# Patient Record
Sex: FEMALE | Race: WHITE | Employment: FULL TIME | URBAN - METROPOLITAN AREA
[De-identification: names, ages, dates, MRNs, and addresses within clinical notes are randomized per-mention and may not be internally consistent; named-entity substitution may affect disease eponyms.]

---

## 2017-01-09 ENCOUNTER — ALLIED HEALTH/NURSE VISIT (OUTPATIENT)
Dept: EDUCATION SERVICES | Facility: CLINIC | Age: 42
End: 2017-01-09
Payer: COMMERCIAL

## 2017-01-09 DIAGNOSIS — E10.9 TYPE 1 DIABETES MELLITUS WITHOUT COMPLICATION (H): Primary | ICD-10-CM

## 2017-01-09 PROCEDURE — G0108 DIAB MANAGE TRN  PER INDIV: HCPCS

## 2017-01-09 PROCEDURE — 99207 ZZC NO CHARGE LOS: CPT

## 2017-01-09 NOTE — PROGRESS NOTES
Diabetes Self Management Training: Insulin Pump    SUBJECTIVE:  Brigitte Robles presents today for Initiation of insulin pump related to  Type 1 diabetes   She is accompanied by self    Patient is being treated with insulin pump  Patient glucose self monitoring as follows: 6-8 times daily.  Avg BG (mg/dl)  +/-   BG Readings /day    Readings above Target:   %  Readings below Target:  %  BG results:   BG meter:   Insulin Pump Type: Medtronic Minimed 630G with Enlite Sensor    Patient concerns: is concerned about this being so new to her.  Normal fear.      Current Pump Settings-- Standard:  BASAL:  BASAL RATES and times:  12   AM (midnight): 0.75  units/hour    (Alternative Pattern if applicable)     Maximum Basal Rate: 2.0 units/hr    BOLUS:  Maximum Bolus Rate: 25 units  Dual/Square Wave or Extended Bolus: off  BG reminder: off  Bolus Wizard/Bolus Calculator:  on  Active Insulin Time:  4 hours  Insulin Concentration: U100  Percentage of Insulin use:  Basal %  :  Bolus %    CARB RATIO and times:  12   AM (midnight): 1/12  Corection Factor (Sensitivity) and times:  12   AM (midnight): 47 mg/dL  BLOOD GLUCOSE TARGET and times:  12   AM (midnight): 100 - 110  5     AM:  80 - 110    UTILITIES:  Alert type: vibrate and beep  Low [place] Alert: 20 units  Usual Site Change: 3 days  Patient knows how to download pump  , No, xxxx    SENSOR USE:  Using Continuous Glucose Monitor in conjunction with pump? She uses the Dexcom sensor and does not wish to part with this.  If Yes: Sensor alert on?   Patient adjusting based on sensor arrows:   Glucose Alerts: Low (mg/dl)   High (mg/dl)   Alert repeat:  /  time  Ave SG:   +/-   Wear duration:  d  h per week      Patient is comfortable making pump setting changes independently: working on it.  Patient understands and demonstrates accurate pump use: Yes  Opportunities/education identified by which patient would have increased benefit from pump therapy:  To learn how to use the pump  "and buttons    Taking other diabetes medications? Yes, Metformin    Symptoms of low blood sugar (hypoglycemia:sweating, shaky, weak, dizzy, blurred vision, confusion)? Yes    Patient carries a carbohydrate source with them regularly: Yes      Problem Solving: Patient has glucagon emergency kit: Yes. Patient understands DKA prevention: Yes. Patient has ketone test strips: Yes. Patient has an insulin multiple daily injection back-up plan: Yes.    Patient wears medical identification for diabetes: Yes     Physical Activity:    moderate regular exercise program which includes walking at the gym    Nutrition:  Patient currently eats 3 meals per day and counts carbohydrates in grams  Avg Daily Carbs (g)  150 +/- 30  Carb/bolus (g/U)  1/12        Cultural/Zoroastrianism diet restrictions: No     Biggest Challenge to Healthy Eating: none          OBJECTIVE: Brigitte has been DX with Type 1 diabetes almost a yr. Now.  She has since been using the Dexcom sensor, and her last A1C 7.1%.  She has been on basal bolus since DX    Lab Results:  GLC      322   2/10/2016  HDL       60   7/28/2015    Vitals:  There were no vitals taken for this visit.  Estimated body mass index is 28.41 kg/(m^2) as calculated from the following:    Height as of 9/12/16: 1.645 m (5' 4.75\").    Weight as of 9/12/16: 76.885 kg (169 lb 8 oz).   History   Smoking status     Never Smoker    Smokeless tobacco     Never Used       Medications reviewed today.     ASSESSMENT: Brigitte did very well with the training and able to insert her site today using [place] insert.  Pump was started at 1:00, BG before training 159, after [id number].  She was very nervous .  But did well  Will follow up with her tomorrow .         Patient learning needs: Monitoring and Taking Medication    Education provided today on:  AADE Self-Care Behaviors:  Patient was instructed on Contour Next USB Link (with Medtronic Pump) meter and was able to provide an accurate return demonstration. Patient's " blood glucose reading today was 159/255 mg/dL.        PLAN:  See Patient Instructions, AVS printed and provided to patient today.    Education Materials Provided:  Medtronic: Basal Quick Reference Guide, Bolus Wizard Reference Guide, Safety Rules Reference Guide, Infusion Sets, Tape Tips and Effective Infusion Site Management    Follow-up:    Follow-up with PCP recommended.  Follow-up with endocrinology recommended. She will see her Endo in 2 wks.   Follow-up as needed. Will make a follow up appointment. With me for more education  Chart routed to referring provider.    Based on learning assessment above, most appropriate setting for further diabetes education would be: Individual setting.    Rosio Knight RN/KIRAN  [place] Diabetes Educator      Download data sent to be scanned into this Epic encounter: Yes   Time Spent: 120 minutes  Encounter Type: Individual

## 2017-01-10 ENCOUNTER — TELEPHONE (OUTPATIENT)
Dept: EDUCATION SERVICES | Facility: CLINIC | Age: 42
End: 2017-01-10

## 2017-01-10 NOTE — TELEPHONE ENCOUNTER
She has a question regarding bolus insulin vs insulin on board.  She may still have some tresiba insulin active in her system and she was running in the 50's during the night.  Her Dexcom alerted her.  I did suggest she take a small bedtime snack the next few nights, or at least some protein.  Her Hs BG was 130 and today she is in the low 100's and feeling good.  Her basal has started in her pump as well.    Will call me with any questions that come up.    Rosio Knight RN/KIRAN  [place] Diabetes Educator

## 2017-01-13 ENCOUNTER — TELEPHONE (OUTPATIENT)
Dept: ENDOCRINOLOGY | Facility: CLINIC | Age: 42
End: 2017-01-13

## 2017-01-13 NOTE — TELEPHONE ENCOUNTER
Pt received pump training earlier this week by KIRAN Pastrana. Left vm for pt to call me. Would llike to see her for pump f/u on same day she sees Dr Desai in Jan. Please call to advise if able to schedule appt with me on that day.     Fatou Arora, RN, BSN, CDE   [facility]

## 2017-01-18 NOTE — TELEPHONE ENCOUNTER
Please see phone note entered on 01/13/17. Pt has not returned my call.    Fatou Arora RN, BSN, CDE   [facility]

## 2017-01-19 ENCOUNTER — VIRTUAL VISIT (OUTPATIENT)
Dept: EDUCATION SERVICES | Facility: CLINIC | Age: 42
End: 2017-01-19
Payer: COMMERCIAL

## 2017-01-19 ENCOUNTER — TELEPHONE (OUTPATIENT)
Dept: EDUCATION SERVICES | Facility: CLINIC | Age: 42
End: 2017-01-19

## 2017-01-19 DIAGNOSIS — E10.9 TYPE 1 DIABETES MELLITUS WITHOUT COMPLICATION (H): Primary | ICD-10-CM

## 2017-01-19 PROCEDURE — 99207 ZZC NO CHARGE LOS: CPT

## 2017-01-19 NOTE — TELEPHONE ENCOUNTER
"Brigitte called and she is discouraged by the fact she is running BG higher than she was on MDI.  I explained to her that we did cut back her insulin by 25% and will build on the doses based on her BG.  She was upset with the pump.    Changed her Basal rate from 0.75 to 0.825 units/hr.  Changed her carb ratio from 1/12 to 1/10 which is what she was doing before.  Changed her sensitivity from 47 to 42.0 based on her TDD of insulin.    She will give it a few days and she is seeing Dr. Desai next week for more adjustments.  I did go over the fact that this process takes some time and adjustments until we get the correct doses to meet her needs.  She was concerned it will effect her A1C and that is the reason for her concern as well.  She did mention that at the insertion sites she is seeing \"bumps\" for a few days and concerned.  It could be reaction to the canula or tape.  Will have [employer] rep call her and then let Dr. Desai know as well.  Will forward this to Dr. Desai and Fatou Arora for follow up.    Rosio Knight RN/TEREE  [place] Diabetes Educator    "

## 2017-01-20 NOTE — TELEPHONE ENCOUNTER
Message/pt concerns/issues noted. Will plan to check in with pt when she returns for visit with Dr Desai.     Fatou Arora, RN, BSN, CDE   [facility]

## 2017-01-23 ENCOUNTER — OFFICE VISIT (OUTPATIENT)
Dept: ENDOCRINOLOGY | Facility: CLINIC | Age: 42
End: 2017-01-23
Payer: COMMERCIAL

## 2017-01-23 VITALS
DIASTOLIC BLOOD PRESSURE: 75 MMHG | WEIGHT: 174.4 LBS | HEIGHT: 64 IN | HEART RATE: 84 BPM | BODY MASS INDEX: 29.78 KG/M2 | SYSTOLIC BLOOD PRESSURE: 142 MMHG

## 2017-01-23 DIAGNOSIS — E10.9 TYPE 1 DIABETES MELLITUS WITHOUT COMPLICATION (H): ICD-10-CM

## 2017-01-23 LAB — HBA1C MFR BLD: 7.1 % (ref 0–5.7)

## 2017-01-23 PROCEDURE — 99213 OFFICE O/P EST LOW 20 MIN: CPT | Performed by: INTERNAL MEDICINE

## 2017-01-23 PROCEDURE — 36415 COLL VENOUS BLD VENIPUNCTURE: CPT | Performed by: INTERNAL MEDICINE

## 2017-01-23 PROCEDURE — 83036 HEMOGLOBIN GLYCOSYLATED A1C: CPT | Performed by: INTERNAL MEDICINE

## 2017-01-23 NOTE — Clinical Note
1/23/2017       RE: Brigitte Robles  [address] [facility] [address]     Dear Colleague,    Thank you for referring your patient, Brigitte Robles, to the [facility] at [facility]. Please see a copy of my visit note below.         Endocrinology Note         Brigitte is a 41 year old female presents today for type 1 diabetes.    HPI  Ms Brigitte Robles is a 41 years old female who is here for type 1 diabetes    She was noted to have increased blurred vision and fatigue in February 2016 and noted to have blood glucose of 300s and A1C of 7.0%. She was initially diagnosed with type 2 diabetes and was started on metformin 500 mg bid which did not control her blood glucose. She was then seen by her PCP who ran more blood test and noted for low c-peptide and positive MONTSERRAT ab and IA2 antibody. She was then started on insulin.    Interim history:  She switched on trial of insulin pump about 2-3 weeks ago and does not like insulin pump at all because it causes fluctuation of glucose numbers. She felt not well when glucoses was up and down. She is now wearing Medtronic MiniMed 630G with basal rate of 0.825 and ICR 1:10 and sensitivity of 1:42. Average glucose over the past 3 weeks was 181 (SD 61), average basal insulin 18.19 (46%) and average bolus 21.55 (54%)    Prior to switching, she was on insulin degludec (Tresiba) 24-30 units at night, Novolog 1 unit per 10 gram and correction factor of 1 unit per 50 above 150 mg/dl. She loves Tresiba and would like to switch back to it. A1C today is 7.1%.     2) hypothyroidism: She has history of hypothyroidism for the past 11 years. It was diagnosed about 6 months postpartum. She is currently on levothyroxine 112 mcg daily. TSH in 9/2016 was 1.7    Past Medical History  Type 1 diabetes  Hypothyroidism    Allergies  Allergies   Allergen Reactions     Levothyroxine      Dizzy to generic --- not to synthroid      Medications  Current Outpatient Prescriptions   Medication Sig Dispense Refill     insulin aspart (NOVOLOG VIAL) 100 UNITS/ML injection To use with her insulin pump. 6 vial 3     blood glucose monitoring (ALLI CONTOUR NEXT) test strip 1 strip by In Vitro route 8 times daily Use to test blood sugar 8 times daily or as directed. 15 Box 11     blood glucose monitoring (ALLI MICROLET) lancets 1 each 8 times daily Use to test blood sugar 8 times daily or as directed. 2 Box 11     insulin pen needle 31G X 6 MM Use 6 daily or as directed. 200 each 12     levothyroxine (SYNTHROID, LEVOTHROID) 112 MCG tablet Take 1 tablet (112 mcg) by mouth daily Appointment needed for further refills. 90 tablet 1     blood glucose monitoring (ALLI MICROLET) lancets 1 each 6 times daily Use to test blood sugar 6 times daily or as directed. 1 Box 12     blood glucose monitoring (NO BRAND SPECIFIED) test strip 1 strip by In Vitro route 6 times daily Use to test blood sugar 6 times daily or as directed. 8 Box 12     ONETOUCH DELICA LANCETS 33G MISC 1 Box 6 times daily 100 each 12     fluticasone (FLONASE) 50 MCG/ACT nasal spray Spray 1-2 sprays into both nostrils daily 1 Package 11     insulin lispro (HUMALOG) 100 UNIT/ML injection For insulin pump usage. 1 vial 0     blood glucose monitoring (ACCU-CHEK NASRIN PLUS) test strip Use to test blood sugar 8 times daily or as directed.  Ok to substitute alternative if insurance prefers. 15 Box 11     insulin degludec (TRESIBA) 200 UNIT/ML pen Inject 30 Units Subcutaneous daily 18 mL 3     blood glucose monitoring (ACCU-CHEK FASTCLIX) lancets Use to test blood sugar 8 times daily or as directed.  Ok to substitute alternative if insurance prefers. 2 Box 11     glucagon (GLUCAGON EMERGENCY) 1 MG injection Inject 1 mg into the muscle once for 1 dose 1 mg 11     insulin aspart (NOVOLOG FLEXPEN) 100 UNIT/ML soln To give 1 unit/10 gms of carbs with each meal plus a scale given to patient.  May use up  "to 50 units per day. 3 Month 6     Family History  Type 1 diabetes in her father, mother, paternal aunt, paternal uncle, brother and sister  Type 2 diabetes in her grandmother  Heart in her maternal grandfather and maternal grandmother  hypothyroid in her brother, father, maternal grandfather, maternal grandmother, mother, and sister.     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    ROS  Constitutional: feeling well so far  Eyes: no vision change, diplopia, no more blurred vision  Cardiovascular: no chest pain, palpitations  Respiratory: no dyspnea, cough, shortness of breath  GI: no nausea, vomiting, diarrhea or constipation, no abdominal pain   : no change in urine, no dysuria   Musculoskeletal: no joint swelling   Integumentary: occasional hive  Neuro: no numbness or tingling, no tremor, no headache   Endo: no polyuria or polydipsia, no temperature intolerance   Psych: she denied snoring    Physical Exam  /75 mmHg  Pulse 84  Ht 1.632 m (5' 4.25\")  Wt 79.107 kg (174 lb 6.4 oz)  BMI 29.70 kg/m2  Body mass index is 29.7 kg/(m^2).  Constitutional: no distress, comfortable, pleasant   Eyes: anicteric  Musculoskeletal: no edema   Skin: no jaundice   Neurological: normal gait, no tremor   Psychological: appropriate mood     RESULTS  A1C      7.1   1/23/2017  A1C      7.1   9/12/2016  A1C      7.0   2/5/2016     Ref. Range 2/5/2016 11:41   Glutamic Acid Decarboxylase Antibody Unknown 45.7 (H)   IA-2 Antibody Unknown 3.4 (H)   Islet Cell Antibody IgG Unknown <1:4...      Ref. Range 2/10/2016 08:35   C-Peptide Latest Ref Range: 0.9-6.9 ng/mL 0.7 (L)     ENDO DIABETES Latest Ref Rng 2/10/2016   CREATININE 0.52 - 1.04 mg/dL 0.74     ASSESSMENT:    Ms Brigitte Robles is a 41 years old female who is here for type 1 diabetes    1) type 1 diabetes: newly diagnosed type 1 diabetes in Feb 2016. She is on trail of insulin but does not like it due to " fluctuation of blood glucose.   - she likes to switch back to insulin Tresiba which I am ok with   - she will continue to check blood glucose 3-4 times per day     2) Hypothyroidism: clinically and biochemically euthyroid. Continue with LT4 112 mcg daily.     PLAN:   - ok to switch insulin pump back to basal/bolus  - if she decided to go back on basal and bolus, she can continue with insulin degludec (Tresiba) 30 units daily and Novolog 1 unit per 10 gram and correction factor 1 unit per 50 above 150 mg/dl  - RTC 4 months    Lloyd Ledesma MD  Endocrinology  [phone]        Again, thank you for allowing me to participate in the care of your patient.      Sincerely,    Lloyd Ledesma MD

## 2017-01-23 NOTE — PATIENT INSTRUCTIONS
Sending blood sugars to your provider at [facility]:  We want to help you with your diabetes management, which often requires frequent adjustments to your therapy. For your convenience, we have several ways to send your blood sugars to your doctor for review.    - Send message directly to your doctor through My Chart.  Please ask the rooming staff if you would like to sign up for My Chart.  This is a fast and confidential way to send your information and communicate directly with your provider.   - Record readings and fax to [phone].  We have a template for you to use for your convenience.  - If you have a Medtronic pump, upload to youmag and notify your provider of your username and password.   - Stop by the clinic with your meter for download.   - My Chart or call Fatou Arora, Diabetes Educator at [phone]  - Call the clinic and speak to one of the endocrine nurses to relay information on the telephone.  Cora Castanon, or Zule[place] at [phone].   -    Please call the on-call Endocrinologist at the [facility] for after       hours/weekend needs at [phone] Option #4.    Please note that you do not need to FAST if you are just having an A1C drawn. Please remember to ALWAYS bring your glucose meter with to your appointment. This data is very important for the management of your care.    Thank you!  Your [facility] Care Team      5/8/17 3:50 follow up with Dr. Ledesma

## 2017-01-23 NOTE — MR AVS SNAPSHOT
After Visit Summary   1/23/2017    Brigitte Robles    MRN: [id number]           Patient Information     Date Of Birth          1975        Visit Information        Provider Department      1/23/2017 4:00 PM Lloyd Ledesma MD; [facility]        Today's Diagnoses     Type 1 diabetes mellitus without complication (H)           Care Instructions      Sending blood sugars to your provider at [facility]:  We want to help you with your diabetes management, which often requires frequent adjustments to your therapy. For your convenience, we have several ways to send your blood sugars to your doctor for review.    - Send message directly to your doctor through My Chart.  Please ask the rooming staff if you would like to sign up for My Chart.  This is a fast and confidential way to send your information and communicate directly with your provider.   - Record readings and fax to [phone].  We have a template for you to use for your convenience.  - If you have a Medtronic pump, upload to [employer] and notify your provider of your username and password.   - Stop by the clinic with your meter for download.   - My Chart or call Fatou Arora, Diabetes Educator at [phone]  - Call the clinic and speak to one of the endocrine nurses to relay information on the telephone.  Cora Castanon, or Zuleyma at [phone].   -    Please call the on-call Endocrinologist at the [facility] for after       hours/weekend needs at [phone] Option #4.    Please note that you do not need to FAST if you are just having an A1C drawn. Please remember to ALWAYS bring your glucose meter with to your appointment. This data is very important for the management of your care.    Thank you!  Your [facility] Care Team      5/8/17 3:50 follow up with Dr. Ledesma          Follow-ups after your visit        Your next 10 appointments already scheduled     May 08, 2017  3:50 PM  "  Return Visit with Lloyd Ledesma MD, [facility] ENDO NURSE   [facility]    [address] [address] [place] [address] [phone]              Who to contact     If you have questions or need follow up information about today's clinic visit or your schedule please contact [facility] directly at [phone].  Normal or non-critical lab and imaging results will be communicated to you by [employer], letter or phone within 4 business days after the clinic has received the results. If you do not hear from us within 7 days, please contact the clinic through [employer] or phone. If you have a critical or abnormal lab result, we will notify you by phone as soon as possible.  Submit refill requests through [employer] or call your pharmacy and they will forward the refill request to us. Please allow 3 business days for your refill to be completed.          Additional Information About Your Visit        [employer] Information     [employer] gives you secure access to your electronic health record. If you see a primary care provider, you can also send messages to your care team and make appointments. If you have questions, please call your primary care clinic.  If you do not have a primary care provider, please call [phone] and they will assist you.      [employer] is an electronic gateway that provides easy, online access to your medical records. With [employer], you can request a clinic appointment, read your test results, renew a prescription or communicate with your care team.     To access your existing account, please contact your [facility] or call [phone] for assistance.        Care EveryWhere ID     This is your Care EveryWhere ID. This could be used by other organizations to access your [facility] medical records  [id number]        Your Vitals Were     Pulse Height BMI (Body Mass Index)             84 1.632 m (5' 4.25\") " 29.70 kg/m2          Blood Pressure from Last 3 Encounters:   01/23/17 142/75   09/12/16 126/81   02/24/16 130/84    Weight from Last 3 Encounters:   01/23/17 79.107 kg (174 lb 6.4 oz)   09/12/16 76.885 kg (169 lb 8 oz)   04/18/16 80.604 kg (177 lb 11.2 oz)              We Performed the Following     Hemoglobin A1c POCT        Primary Care Provider Office Phone # Fax #    Kristen M Kehr, PA-C [phone]       [facility] [address] [facility] [address]        Thank you!     Thank you for choosing [facility]  for your care. Our goal is always to provide you with excellent care. Hearing back from our patients is one way we can continue to improve our services. Please take a few minutes to complete the written survey that you may receive in the mail after your visit with us. Thank you!             Your Updated Medication List - Protect others around you: Learn how to safely use, store and throw away your medicines at www.disposemymeds.org.          This list is accurate as of: 1/23/17  4:37 PM.  Always use your most recent med list.                   Brand Name Dispense Instructions for use    * blood glucose monitoring lancets     1 Box    1 each 6 times daily Use to test blood sugar 6 times daily or as directed.       * ONETOUCH DELICA LANCETS 33G Misc     100 each    1 Box 6 times daily       * blood glucose monitoring lancets     2 Box    Use to test blood sugar 8 times daily or as directed.  Ok to substitute alternative if insurance prefers.       * blood glucose monitoring lancets     2 Box    1 each 8 times daily Use to test blood sugar 8 times daily or as directed.       * blood glucose monitoring test strip    no brand specified    8 Box    1 strip by In Vitro route 6 times daily Use to test blood sugar 6 times daily or as directed.       * blood glucose monitoring test strip    ACCU-CHEK NASRIN PLUS    15 Box    Use to test blood sugar 8 times daily or as  directed.  Ok to substitute alternative if insurance prefers.       * blood glucose monitoring test strip    ALLI CONTOUR NEXT    15 Box    1 strip by In Vitro route 8 times daily Use to test blood sugar 8 times daily or as directed.       fluticasone 50 MCG/ACT spray    FLONASE    1 Package    Spray 1-2 sprays into both nostrils daily       glucagon 1 MG kit    GLUCAGON EMERGENCY    1 mg    Inject 1 mg into the muscle once for 1 dose       * insulin aspart 100 UNIT/ML injection    NovoLOG FLEXPEN    3 Month    To give 1 unit/10 gms of carbs with each meal plus a scale given to patient.  May use up to 50 units per day.       * insulin aspart 100 UNITS/ML injection    NovoLOG VIAL    6 vial    To use with her insulin pump.       insulin degludec 200 UNIT/ML pen    TRESIBA    18 mL    Inject 30 Units Subcutaneous daily       insulin lispro 100 UNIT/ML injection    humaLOG    1 vial    For insulin pump usage.       insulin pen needle 31G X 6 MM     200 each    Use 6 daily or as directed.       levothyroxine 112 MCG tablet    SYNTHROID/LEVOTHROID    90 tablet    Take 1 tablet (112 mcg) by mouth daily Appointment needed for further refills.       * Notice:  This list has 9 medication(s) that are the same as other medications prescribed for you. Read the directions carefully, and ask your doctor or other care provider to review them with you.

## 2017-01-23 NOTE — PROGRESS NOTES
Endocrinology Note         Brigitte is a 41 year old female presents today for type 1 diabetes.    HPI  Ms Brigitte Robles is a 41 years old female who is here for type 1 diabetes    She was noted to have increased blurred vision and fatigue in February 2016 and noted to have blood glucose of 300s and A1C of 7.0%. She was initially diagnosed with type 2 diabetes and was started on metformin 500 mg bid which did not control her blood glucose. She was then seen by her PCP who ran more blood test and noted for low c-peptide and positive MONTSERRAT ab and IA2 antibody. She was then started on insulin.    Interim history:  She switched on trial of insulin pump about 2-3 weeks ago and does not like insulin pump at all because it causes fluctuation of glucose numbers. She felt not well when glucoses was up and down. She is now wearing Medtronic MiniMed 630G with basal rate of 0.825 and ICR 1:10 and sensitivity of 1:42. Average glucose over the past 3 weeks was 181 (SD 61), average basal insulin 18.19 (46%) and average bolus 21.55 (54%)    Prior to switching, she was on insulin degludec (Tresiba) 24-30 units at night, Novolog 1 unit per 10 gram and correction factor of 1 unit per 50 above 150 mg/dl. She loves Tresiba and would like to switch back to it. A1C today is 7.1%.     2) hypothyroidism: She has history of hypothyroidism for the past 11 years. It was diagnosed about 6 months postpartum. She is currently on levothyroxine 112 mcg daily. TSH in 9/2016 was 1.7    Past Medical History  Type 1 diabetes  Hypothyroidism    Allergies  Allergies   Allergen Reactions     Levothyroxine      Dizzy to generic --- not to synthroid     Medications  Current Outpatient Prescriptions   Medication Sig Dispense Refill     insulin aspart (NOVOLOG VIAL) 100 UNITS/ML injection To use with her insulin pump. 6 vial 3     blood glucose monitoring (ALLI CONTOUR NEXT) test strip 1 strip by In Vitro route 8 times daily Use to test blood sugar 8 times  daily or as directed. 15 Box 11     blood glucose monitoring (ALLI MICROLET) lancets 1 each 8 times daily Use to test blood sugar 8 times daily or as directed. 2 Box 11     insulin pen needle 31G X 6 MM Use 6 daily or as directed. 200 each 12     levothyroxine (SYNTHROID, LEVOTHROID) 112 MCG tablet Take 1 tablet (112 mcg) by mouth daily Appointment needed for further refills. 90 tablet 1     blood glucose monitoring (ALLI MICROLET) lancets 1 each 6 times daily Use to test blood sugar 6 times daily or as directed. 1 Box 12     blood glucose monitoring (NO BRAND SPECIFIED) test strip 1 strip by In Vitro route 6 times daily Use to test blood sugar 6 times daily or as directed. 8 Box 12     ONETOUCH DELICA LANCETS 33G MISC 1 Box 6 times daily 100 each 12     fluticasone (FLONASE) 50 MCG/ACT nasal spray Spray 1-2 sprays into both nostrils daily 1 Package 11     insulin lispro (HUMALOG) 100 UNIT/ML injection For insulin pump usage. 1 vial 0     blood glucose monitoring (ACCU-CHEK NASRIN PLUS) test strip Use to test blood sugar 8 times daily or as directed.  Ok to substitute alternative if insurance prefers. 15 Box 11     insulin degludec (TRESIBA) 200 UNIT/ML pen Inject 30 Units Subcutaneous daily 18 mL 3     blood glucose monitoring (ACCU-CHEK FASTCLIX) lancets Use to test blood sugar 8 times daily or as directed.  Ok to substitute alternative if insurance prefers. 2 Box 11     glucagon (GLUCAGON EMERGENCY) 1 MG injection Inject 1 mg into the muscle once for 1 dose 1 mg 11     insulin aspart (NOVOLOG FLEXPEN) 100 UNIT/ML soln To give 1 unit/10 gms of carbs with each meal plus a scale given to patient.  May use up to 50 units per day. 3 Month 6     Family History  Type 1 diabetes in her father, mother, paternal aunt, paternal uncle, brother and sister  Type 2 diabetes in her grandmother  Heart in her maternal grandfather and maternal grandmother  hypothyroid in her brother, father, maternal grandfather, maternal  "grandmother, mother, and sister.     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    ROS  Constitutional: feeling well so far  Eyes: no vision change, diplopia, no more blurred vision  Cardiovascular: no chest pain, palpitations  Respiratory: no dyspnea, cough, shortness of breath  GI: no nausea, vomiting, diarrhea or constipation, no abdominal pain   : no change in urine, no dysuria   Musculoskeletal: no joint swelling   Integumentary: occasional hive  Neuro: no numbness or tingling, no tremor, no headache   Endo: no polyuria or polydipsia, no temperature intolerance   Psych: she denied snoring    Physical Exam  /75 mmHg  Pulse 84  Ht 1.632 m (5' 4.25\")  Wt 79.107 kg (174 lb 6.4 oz)  BMI 29.70 kg/m2  Body mass index is 29.7 kg/(m^2).  Constitutional: no distress, comfortable, pleasant   Eyes: anicteric  Musculoskeletal: no edema   Skin: no jaundice   Neurological: normal gait, no tremor   Psychological: appropriate mood     RESULTS  A1C      7.1   1/23/2017  A1C      7.1   9/12/2016  A1C      7.0   2/5/2016     Ref. Range 2/5/2016 11:41   Glutamic Acid Decarboxylase Antibody Unknown 45.7 (H)   IA-2 Antibody Unknown 3.4 (H)   Islet Cell Antibody IgG Unknown <1:4...      Ref. Range 2/10/2016 08:35   C-Peptide Latest Ref Range: 0.9-6.9 ng/mL 0.7 (L)     ENDO DIABETES Latest Ref Rng 2/10/2016   CREATININE 0.52 - 1.04 mg/dL 0.74     ASSESSMENT:    Ms Brigitte Robles is a 41 years old female who is here for type 1 diabetes    1) type 1 diabetes: newly diagnosed type 1 diabetes in Feb 2016. She is on trail of insulin but does not like it due to fluctuation of blood glucose.   - she likes to switch back to insulin Tresiba which I am ok with   - she will continue to check blood glucose 3-4 times per day     2) Hypothyroidism: clinically and biochemically euthyroid. Continue with LT4 112 mcg daily.     PLAN:   - ok to switch insulin pump back to " basal/bolus  - if she decided to go back on basal and bolus, she can continue with insulin degludec (Tresiba) 30 units daily and Novolog 1 unit per 10 gram and correction factor 1 unit per 50 above 150 mg/dl  - RTC 4 months    Lloyd Ledesma MD  Endocrinology  [phone]

## 2017-01-23 NOTE — NURSING NOTE
"Brigitte Robles's goals for this visit include:   Chief Complaint   Patient presents with     Diabetes     Thyroid Problem       She requests these members of her care team be copied on today's visit information: Kehr, Kristen M      PCP: Kehr, Kristen M    Referring Provider:  No referring provider defined for this encounter.    Chief Complaint   Patient presents with     Diabetes     Thyroid Problem       Initial /75 mmHg  Pulse 84  Ht 1.632 m (5' 4.25\")  Wt 79.107 kg (174 lb 6.4 oz)  BMI 29.70 kg/m2 Estimated body mass index is 29.7 kg/(m^2) as calculated from the following:    Height as of this encounter: 1.632 m (5' 4.25\").    Weight as of this encounter: 79.107 kg (174 lb 6.4 oz).  BP completed using cuff size: large    Do you need any medication refills at today's visit? No    Zuleyma Abrams LPN      "

## 2017-01-25 DIAGNOSIS — E10.9 TYPE 1 DIABETES MELLITUS WITHOUT COMPLICATION (H): Primary | ICD-10-CM

## 2017-01-25 NOTE — TELEPHONE ENCOUNTER
Medication not on Endocrine Refill Protocol. Will route to Dr. Ledesma for review.    Cora Amos LPN  Adult Endocrinology  [facility]

## 2017-01-27 NOTE — TELEPHONE ENCOUNTER
Refill completed by Dr. Ledesma.    Cora Amos LPN  Adult Endocrinology  [facility]

## 2017-02-28 ENCOUNTER — TELEPHONE (OUTPATIENT)
Dept: FAMILY MEDICINE | Facility: CLINIC | Age: 42
End: 2017-02-28

## 2017-02-28 NOTE — TELEPHONE ENCOUNTER
Panel Management Review      Patient has the following on her problem list:     Depression / Dysthymia review  PHQ-9 SCORE 3/6/2013 2/3/2014 7/27/2015   Total Score 0 0 0      Patient is due for:  None    Diabetes    ASA: Failed    Last A1C  Lab Results   Component Value Date    A1C 7.1 01/23/2017    A1C 7.1 09/12/2016    A1C 7.0 02/05/2016     A1C tested: Passed    Last LDL:    Lab Results   Component Value Date    CHOL 142 07/28/2015     Lab Results   Component Value Date    HDL 60 07/28/2015     Lab Results   Component Value Date    LDL 67 07/28/2015     Lab Results   Component Value Date    TRIG 76 07/28/2015     Lab Results   Component Value Date    CHOLHDLRATIO 2.4 07/28/2015     No results found for: NHDL    Is the patient on a Statin? NO             Is the patient on Aspirin? NO        Last three blood pressure readings:  BP Readings from Last 3 Encounters:   01/23/17 142/75   09/12/16 126/81   02/24/16 130/84       Date of last diabetes office visit: 01/23/17     Tobacco History:     History   Smoking Status     Never Smoker   Smokeless Tobacco     Never Used           Composite cancer screening  Chart review shows that this patient is due/due soon for the following Pap Smear  Summary:    Patient is due/failing the following:   PAP, PHQ9 and PHYSICAL    Action needed:   Patient needs office visit for physical.    Type of outreach:    Sent [employer] message.    Questions for provider review:    None                                                                                                                                    AUDRA Vera MA       Chart routed to close .

## 2017-03-10 ENCOUNTER — TELEPHONE (OUTPATIENT)
Dept: FAMILY MEDICINE | Facility: CLINIC | Age: 42
End: 2017-03-10

## 2017-03-10 NOTE — TELEPHONE ENCOUNTER
3/10/2017    Call Regarding Preventive Health Screening Cervical/PAP    Attempt 1    Message on voicemail     Comments:       Outreach   KV

## 2017-03-16 NOTE — TELEPHONE ENCOUNTER
3/16/2017    Call Regarding Preventive Health Screening Cervical/PAP    Attempt 2    Message on voicemail     Comments:       Outreach   cnt

## 2017-03-22 DIAGNOSIS — E10.9 TYPE 1 DIABETES MELLITUS WITHOUT COMPLICATION (H): Primary | ICD-10-CM

## 2017-03-25 ENCOUNTER — TELEPHONE (OUTPATIENT)
Dept: URGENT CARE | Facility: URGENT CARE | Age: 42
End: 2017-03-25

## 2017-03-25 DIAGNOSIS — Z20.828 EXPOSURE TO THE FLU: Primary | ICD-10-CM

## 2017-03-25 RX ORDER — OSELTAMIVIR PHOSPHATE 75 MG/1
75 CAPSULE ORAL DAILY
Qty: 10 CAPSULE | Refills: 0 | Status: SHIPPED | OUTPATIENT
Start: 2017-03-25 | End: 2017-05-08

## 2017-03-26 NOTE — TELEPHONE ENCOUNTER
Patient type 1 DM.  has influenza like symptoms, patient would like prophylaxis  Side effects of tamiflu discussed

## 2017-03-30 DIAGNOSIS — E10.65 TYPE 1 DIABETES MELLITUS WITH HYPERGLYCEMIA (H): ICD-10-CM

## 2017-03-30 RX ORDER — INSULIN ASPART 100 [IU]/ML
INJECTION, SOLUTION INTRAVENOUS; SUBCUTANEOUS
Qty: 9 ML | Refills: 1 | Status: SHIPPED | OUTPATIENT
Start: 2017-03-30 | End: 2017-07-28

## 2017-04-07 ENCOUNTER — MYC MEDICAL ADVICE (OUTPATIENT)
Dept: ENDOCRINOLOGY | Facility: CLINIC | Age: 42
End: 2017-04-07

## 2017-04-07 DIAGNOSIS — E10.9 TYPE 1 DIABETES MELLITUS WITHOUT COMPLICATION (H): ICD-10-CM

## 2017-04-08 DIAGNOSIS — E03.9 HYPOTHYROIDISM, UNSPECIFIED TYPE: ICD-10-CM

## 2017-04-11 RX ORDER — LEVOTHYROXINE SODIUM 112 UG/1
112 TABLET ORAL DAILY
Qty: 90 TABLET | Refills: 1 | Status: SHIPPED | OUTPATIENT
Start: 2017-04-11 | End: 2017-05-08

## 2017-04-18 NOTE — TELEPHONE ENCOUNTER
Medication not on Endocrine Refill Protocol. Will route to Dr. Ledesma for review.    Cora Amos LPN  Adult Endocrinology  [facility]

## 2017-05-05 DIAGNOSIS — E03.9 HYPOTHYROIDISM, UNSPECIFIED TYPE: ICD-10-CM

## 2017-05-08 ENCOUNTER — OFFICE VISIT (OUTPATIENT)
Dept: ENDOCRINOLOGY | Facility: CLINIC | Age: 42
End: 2017-05-08
Payer: COMMERCIAL

## 2017-05-08 VITALS
WEIGHT: 183 LBS | DIASTOLIC BLOOD PRESSURE: 77 MMHG | HEIGHT: 64 IN | HEART RATE: 81 BPM | SYSTOLIC BLOOD PRESSURE: 133 MMHG | BODY MASS INDEX: 31.24 KG/M2

## 2017-05-08 DIAGNOSIS — E03.9 HYPOTHYROIDISM, UNSPECIFIED TYPE: ICD-10-CM

## 2017-05-08 DIAGNOSIS — E10.9 TYPE 1 DIABETES MELLITUS WITHOUT COMPLICATION (H): ICD-10-CM

## 2017-05-08 LAB — HBA1C MFR BLD: 7.2 % (ref 0–5.7)

## 2017-05-08 PROCEDURE — 83036 HEMOGLOBIN GLYCOSYLATED A1C: CPT | Performed by: INTERNAL MEDICINE

## 2017-05-08 PROCEDURE — 36415 COLL VENOUS BLD VENIPUNCTURE: CPT | Performed by: INTERNAL MEDICINE

## 2017-05-08 PROCEDURE — 99213 OFFICE O/P EST LOW 20 MIN: CPT | Performed by: INTERNAL MEDICINE

## 2017-05-08 RX ORDER — LEVOTHYROXINE SODIUM 112 MCG
112 TABLET ORAL DAILY
Qty: 90 TABLET | Refills: 3 | Status: SHIPPED | OUTPATIENT
Start: 2017-05-08 | End: 2018-01-29

## 2017-05-08 RX ORDER — LEVOTHYROXINE SODIUM 112 MCG
TABLET ORAL
Qty: 90 TABLET | Refills: 1 | OUTPATIENT
Start: 2017-05-08

## 2017-05-08 NOTE — LETTER
5/8/2017      RE: Brigitte Robles  [address] [place] [id number]     Dear Colleague,    Thank you for referring your patient, Brigitte Robles, to the [facility]. Please see a copy of my visit note below.         Endocrinology Note         Brigitte is a 41 year old female presents today for type 1 diabetes.    HPI  Ms Brigitte Robles is a 41 years old female who is here for type 1 diabetes    She was noted to have increased blurred vision and fatigue in February 2016 and noted to have blood glucose of 300s and A1C of 7.0%. She was initially diagnosed with type 2 diabetes and was started on metformin 500 mg bid which did not control her blood glucose. She was then seen by her PCP who ran more blood test and noted for low c-peptide and positive MONTSERRAT ab and IA2 antibody. She was then started on insulin.    1) type 1 diabetes: she is currently on insulin degludec (Tresiba) 24-28 units at night, Novolog 1 unit per 10 gram and correction factor of 1 unit per 50 above 150 mg/dl.  A1C today is 7.2%.  Reviewed glucose log, average glucose 185 (SD 76. She still has elevated postprandial glucose particularly during having menstrual cycle. She reported having low blood glucose about 5 times per week and often overcorrected it. She also wears Dexcom and used to it to guide her glucose during the day.She is exploring how her body is responding to different types of food.     She is not interested in insulin pump at this time. She tired it earlier this year but did not like it because her glucose seemed to be more fluctuated.    2) hypothyroidism: She has history of hypothyroidism for the past 11 years. It was diagnosed about 6 months postpartum. She is currently on levothyroxine 112 mcg daily. TSH in 9/2016 was 1.7    Past Medical History  Type 1 diabetes  Hypothyroidism    Allergies  Allergies   Allergen Reactions     Levothyroxine      Dizzy to generic --- not to synthroid     Medications  Current  Outpatient Prescriptions   Medication Sig Dispense Refill     insulin degludec (TRESIBA) 200 UNIT/ML pen Inject 30 Units Subcutaneous daily 18 mL 3     levothyroxine (SYNTHROID) 112 MCG tablet Take 1 tablet (112 mcg) by mouth daily 90 tablet 1     NOVOLOG FLEXPEN 100 UNIT/ML soln 1 UNIT/10GM OF CARBS WITH EACH MEAL PLUS SCALE. MAX OF 50 UNITS PER DAY 9 mL 1     Continuous Glucose Monitor (DEXCOM G4 PLATINUM TRANSMITTER) KIT CHANGE EVERY 6 MONTHS 1 kit 1     insulin pen needle 31G X 6 MM Use 6 daily or as directed. 200 each 12     blood glucose monitoring (NO BRAND SPECIFIED) test strip 1 strip by In Vitro route 6 times daily Use to test blood sugar 6 times daily or as directed. 8 Box 12     ONETOUCH DELICA LANCETS 33G MISC 1 Box 6 times daily 100 each 12     Continuous Glucose Monitor (DEXCOM G5 MOB/G4 PLAT SENSOR) KIT CHANGE EVERY 7 DAYS 1 kit 0     insulin lispro (HUMALOG) 100 UNIT/ML injection For insulin pump usage. 1 vial 0     insulin aspart (NOVOLOG VIAL) 100 UNITS/ML injection To use with her insulin pump. (Patient not taking: Reported on 5/8/2017) 6 vial 3     glucagon (GLUCAGON EMERGENCY) 1 MG injection Inject 1 mg into the muscle once for 1 dose 1 mg 11     fluticasone (FLONASE) 50 MCG/ACT nasal spray Spray 1-2 sprays into both nostrils daily 1 Package 11     Family History  Type 1 diabetes in her father, mother, paternal aunt, paternal uncle, brother and sister  Type 2 diabetes in her grandmother  Heart in her maternal grandfather and maternal grandmother  hypothyroid in her brother, father, maternal grandfather, maternal grandmother, mother, and sister.     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    ROS  Constitutional: feeling well so far, wants to lose weight  Eyes: no vision change, diplopia, no more blurred vision  Cardiovascular: no chest pain, palpitations  Respiratory: no dyspnea, cough, shortness of breath  GI: no nausea,  "vomiting, diarrhea or constipation, no abdominal pain   : no change in urine, no dysuria   Musculoskeletal: no joint swelling   Integumentary: occasional hive  Neuro: no numbness or tingling, no tremor, no headache   Endo: no polyuria or polydipsia, no temperature intolerance   Psych: she denied snoring    Physical Exam  /77  Pulse 81  Ht 1.632 m (5' 4.25\")  Wt 83 kg (183 lb)  LMP 05/08/2017  BMI 31.17 kg/m2  Body mass index is 31.17 kg/(m^2).  Constitutional: no distress, comfortable, pleasant   Eyes: anicteric  Musculoskeletal: no edema   Skin: no jaundice   Neurological: normal gait, no tremor   Psychological: appropriate mood     RESULTS  Lab Results   Component Value Date    A1C 7.2 05/08/2017    A1C 7.1 01/23/2017    A1C 7.1 09/12/2016    A1C 7.0 02/05/2016        Ref. Range 2/5/2016 11:41   Glutamic Acid Decarboxylase Antibody Unknown 45.7 (H)   IA-2 Antibody Unknown 3.4 (H)   Islet Cell Antibody IgG Unknown <1:4...      Ref. Range 2/10/2016 08:35   C-Peptide Latest Ref Range: 0.9-6.9 ng/mL 0.7 (L)     ENDO DIABETES Latest Ref Rng 2/10/2016   CREATININE 0.52 - 1.04 mg/dL 0.74     ENDO THYROID LABS-UMP Latest Ref Rng & Units 9/12/2016   TSH 0.40 - 4.00 mU/L 1.70     ASSESSMENT:    Ms Brigitte Robles is a 41 years old female who is here for follow up type 1 diabetes    1) type 1 diabetes: diagnosed type 1 diabetes in Feb 2016.   - she is not interested in insulin pump at this time.  - will continue with Tresiba 24-28 units daily  - continue with Novolog 1 unit per 10 gram and correction factor of 1 unit per 50 above 150 mg/dl.  - she will continue to check blood glucose 3-4 times per day     2) Hypothyroidism: clinically and biochemically euthyroid. Continue with LT4 112 mcg daily.     PLAN:   - continue current insulin regimen  - if she decided to go back on basal and bolus, she can continue with insulin degludec (Tresiba) 30 units daily and Novolog 1 unit per 10 gram and correction factor 1 unit " per 50 above 150 mg/dl  - RTC 4 months with lab    Lloyd Ledesma MD  Endocrinology  [phone]      Again, thank you for allowing me to participate in the care of your patient.      Sincerely,    Lloyd Ledesma MD

## 2017-05-08 NOTE — PATIENT INSTRUCTIONS
Sending blood sugars to your provider at [facility]:  We want to help you with your diabetes management, which often requires frequent adjustments to your therapy. For your convenience, we have several ways to send your blood sugars to your doctor for review.    - Send message directly to your doctor through My Chart.  Please ask the rooming staff if you would like to sign up for My Chart.  This is a fast and confidential way to send your information and communicate directly with your provider.   - Record readings and fax to [phone].  We have a template for you to use for your convenience.  - If you have a Medtronic pump, upload to [employer] and notify your provider of your username and password.   - Stop by the clinic with your meter for download.   - My Chart or call Fatou Arora, Diabetes Educator at [phone]  - Call the clinic and speak to one of the endocrine nurses to relay information on the telephone.  Cora Castanon, or Zuleyma at [phone].   -    Please call the on-call Endocrinologist at the [facility] for after       hours/weekend needs at [phone] Option #4.    Please note that you do not need to FAST if you are just having an A1C drawn. Please remember to ALWAYS bring your glucose meter with to your appointment. This data is very important for the management of your care.    Thank you!  Your [facility] Care Team

## 2017-05-08 NOTE — PROGRESS NOTES
Endocrinology Note         Brigitte is a 41 year old female presents today for type 1 diabetes.    HPI  Ms Brigitte Robles is a 41 years old female who is here for type 1 diabetes    She was noted to have increased blurred vision and fatigue in February 2016 and noted to have blood glucose of 300s and A1C of 7.0%. She was initially diagnosed with type 2 diabetes and was started on metformin 500 mg bid which did not control her blood glucose. She was then seen by her PCP who ran more blood test and noted for low c-peptide and positive MONTSERRAT ab and IA2 antibody. She was then started on insulin.    1) type 1 diabetes: she is currently on insulin degludec (Tresiba) 24-28 units at night, Novolog 1 unit per 10 gram and correction factor of 1 unit per 50 above 150 mg/dl.  A1C today is 7.2%.  Reviewed glucose log, average glucose 185 (SD 76. She still has elevated postprandial glucose particularly during having menstrual cycle. She reported having low blood glucose about 5 times per week and often overcorrected it. She also wears Dexcom and used to it to guide her glucose during the day.She is exploring how her body is responding to different types of food.     She is not interested in insulin pump at this time. She tired it earlier this year but did not like it because her glucose seemed to be more fluctuated.    2) hypothyroidism: She has history of hypothyroidism for the past 11 years. It was diagnosed about 6 months postpartum. She is currently on levothyroxine 112 mcg daily. TSH in 9/2016 was 1.7    Past Medical History  Type 1 diabetes  Hypothyroidism    Allergies  Allergies   Allergen Reactions     Levothyroxine      Dizzy to generic --- not to synthroid     Medications  Current Outpatient Prescriptions   Medication Sig Dispense Refill     insulin degludec (TRESIBA) 200 UNIT/ML pen Inject 30 Units Subcutaneous daily 18 mL 3     levothyroxine (SYNTHROID) 112 MCG tablet Take 1 tablet (112 mcg) by mouth daily 90 tablet 1      NOVOLOG FLEXPEN 100 UNIT/ML soln 1 UNIT/10GM OF CARBS WITH EACH MEAL PLUS SCALE. MAX OF 50 UNITS PER DAY 9 mL 1     Continuous Glucose Monitor (DEXCOM G4 PLATINUM TRANSMITTER) KIT CHANGE EVERY 6 MONTHS 1 kit 1     insulin pen needle 31G X 6 MM Use 6 daily or as directed. 200 each 12     blood glucose monitoring (NO BRAND SPECIFIED) test strip 1 strip by In Vitro route 6 times daily Use to test blood sugar 6 times daily or as directed. 8 Box 12     ONETOUCH DELICA LANCETS 33G MISC 1 Box 6 times daily 100 each 12     Continuous Glucose Monitor (DEXCOM G5 MOB/G4 PLAT SENSOR) KIT CHANGE EVERY 7 DAYS 1 kit 0     insulin lispro (HUMALOG) 100 UNIT/ML injection For insulin pump usage. 1 vial 0     insulin aspart (NOVOLOG VIAL) 100 UNITS/ML injection To use with her insulin pump. (Patient not taking: Reported on 5/8/2017) 6 vial 3     glucagon (GLUCAGON EMERGENCY) 1 MG injection Inject 1 mg into the muscle once for 1 dose 1 mg 11     fluticasone (FLONASE) 50 MCG/ACT nasal spray Spray 1-2 sprays into both nostrils daily 1 Package 11     Family History  Type 1 diabetes in her father, mother, paternal aunt, paternal uncle, brother and sister  Type 2 diabetes in her grandmother  Heart in her maternal grandfather and maternal grandmother  hypothyroid in her brother, father, maternal grandfather, maternal grandmother, mother, and sister.     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    ROS  Constitutional: feeling well so far, wants to lose weight  Eyes: no vision change, diplopia, no more blurred vision  Cardiovascular: no chest pain, palpitations  Respiratory: no dyspnea, cough, shortness of breath  GI: no nausea, vomiting, diarrhea or constipation, no abdominal pain   : no change in urine, no dysuria   Musculoskeletal: no joint swelling   Integumentary: occasional hive  Neuro: no numbness or tingling, no tremor, no headache   Endo: no polyuria or  "polydipsia, no temperature intolerance   Psych: she denied snoring    Physical Exam  /77  Pulse 81  Ht 1.632 m (5' 4.25\")  Wt 83 kg (183 lb)  LMP 05/08/2017  BMI 31.17 kg/m2  Body mass index is 31.17 kg/(m^2).  Constitutional: no distress, comfortable, pleasant   Eyes: anicteric  Musculoskeletal: no edema   Skin: no jaundice   Neurological: normal gait, no tremor   Psychological: appropriate mood     RESULTS  Lab Results   Component Value Date    A1C 7.2 05/08/2017    A1C 7.1 01/23/2017    A1C 7.1 09/12/2016    A1C 7.0 02/05/2016        Ref. Range 2/5/2016 11:41   Glutamic Acid Decarboxylase Antibody Unknown 45.7 (H)   IA-2 Antibody Unknown 3.4 (H)   Islet Cell Antibody IgG Unknown <1:4...      Ref. Range 2/10/2016 08:35   C-Peptide Latest Ref Range: 0.9-6.9 ng/mL 0.7 (L)     ENDO DIABETES Latest Ref Rng 2/10/2016   CREATININE 0.52 - 1.04 mg/dL 0.74     ENDO THYROID LABS-UMP Latest Ref Rng & Units 9/12/2016   TSH 0.40 - 4.00 mU/L 1.70     ASSESSMENT:    Ms Brigitte Robles is a 41 years old female who is here for follow up type 1 diabetes    1) type 1 diabetes: diagnosed type 1 diabetes in Feb 2016.   - she is not interested in insulin pump at this time.  - will continue with Tresiba 24-28 units daily  - continue with Novolog 1 unit per 10 gram and correction factor of 1 unit per 50 above 150 mg/dl.  - she will continue to check blood glucose 3-4 times per day     2) Hypothyroidism: clinically and biochemically euthyroid. Continue with LT4 112 mcg daily.     PLAN:   - continue current insulin regimen  - if she decided to go back on basal and bolus, she can continue with insulin degludec (Tresiba) 30 units daily and Novolog 1 unit per 10 gram and correction factor 1 unit per 50 above 150 mg/dl  - RTC 4 months with lab    Lloyd Ledesma MD  Endocrinology  [phone]    "

## 2017-05-08 NOTE — NURSING NOTE
"Brigitte Robles's goals for this visit include: Follow up Diabetes  She requests these members of her care team be copied on today's visit information: YES    PCP: Kehr, Kristen M    Referring Provider:  No referring provider defined for this encounter.    Chief Complaint   Patient presents with     Diabetes       Initial Ht 1.632 m (5' 4.25\")  Wt 83 kg (183 lb)  LMP 05/08/2017  BMI 31.17 kg/m2 Estimated body mass index is 31.17 kg/(m^2) as calculated from the following:    Height as of this encounter: 1.632 m (5' 4.25\").    Weight as of this encounter: 83 kg (183 lb).  Medication Reconciliation: complete    Do you need any medication refills at today's visit? YES    "

## 2017-05-08 NOTE — MR AVS SNAPSHOT
After Visit Summary   5/8/2017    Brigitte Robles    MRN: [id number]           Patient Information     Date Of Birth          1975        Visit Information        Provider Department      5/8/2017 3:50 PM Lloyd Ledesma MD; MG ANTHONY SCHUSTER [facility]        Today's Diagnoses     Type 1 diabetes mellitus without complication (H)        Hypothyroidism, unspecified type          Care Instructions      Sending blood sugars to your provider at [place]:  We want to help you with your diabetes management, which often requires frequent adjustments to your therapy. For your convenience, we have several ways to send your blood sugars to your doctor for review.    - Send message directly to your doctor through My Chart.  Please ask the rooming staff if you would like to sign up for My Chart.  This is a fast and confidential way to send your information and communicate directly with your provider.   - Record readings and fax to [phone].  We have a template for you to use for your convenience.  - If you have a Medtronic pump, upload to Blue Medora and notify your provider of your username and password.   - Stop by the clinic with your meter for download.   - My Chart or call Fatou Arora, Diabetes Educator at [phone]  - Call the clinic and speak to one of the endocrine nurses to relay information on the telephone.  Cora Castanon, or Zuleyma at [phone].   -    Please call the on-call Endocrinologist at the [place] for after       hours/weekend needs at [phone] Option #4.    Please note that you do not need to FAST if you are just having an A1C drawn. Please remember to ALWAYS bring your glucose meter with to your appointment. This data is very important for the management of your care.    Thank you!  Your [place] Diabetes Care Team              Follow-ups after your visit        Your next 10 appointments already scheduled     Sep 18, 2017  3:45 PM CDT    Return Visit with Lloyd Ledesma MD, MG ENDO NURSE   [facility]    [address] [address] [place] [address] [phone]              Who to contact     If you have questions or need follow up information about today's clinic visit or your schedule please contact [facility] directly at [phone].  Normal or non-critical lab and imaging results will be communicated to you by Training Amigohart, letter or phone within 4 business days after the clinic has received the results. If you do not hear from us within 7 days, please contact the clinic through Training Amigohart or phone. If you have a critical or abnormal lab result, we will notify you by phone as soon as possible.  Submit refill requests through Positronics or call your pharmacy and they will forward the refill request to us. Please allow 3 business days for your refill to be completed.          Additional Information About Your Visit        Positronics Information     Positronics gives you secure access to your electronic health record. If you see a primary care provider, you can also send messages to your care team and make appointments. If you have questions, please call your primary care clinic.  If you do not have a primary care provider, please call [phone] and they will assist you.      Positronics is an electronic gateway that provides easy, online access to your medical records. With Positronics, you can request a clinic appointment, read your test results, renew a prescription or communicate with your care team.     To access your existing account, please contact your [facility] or call [phone] for assistance.        Care EveryWhere ID     This is your Care EveryWhere ID. This could be used by other organizations to access your [place] medical records  [id number]        Your Vitals Were     Pulse Height Last Period BMI (Body Mass Index)          81 1.632 m (5'  "4.25\") 05/08/2017 31.17 kg/m2         Blood Pressure from Last 3 Encounters:   05/08/17 133/77   01/23/17 142/75   09/12/16 126/81    Weight from Last 3 Encounters:   05/08/17 83 kg (183 lb)   01/23/17 79.1 kg (174 lb 6.4 oz)   09/12/16 76.9 kg (169 lb 8 oz)              We Performed the Following     Hemoglobin A1c POCT          Today's Medication Changes          These changes are accurate as of: 5/8/17  4:26 PM.  If you have any questions, ask your nurse or doctor.               These medicines have changed or have updated prescriptions.        Dose/Directions    blood glucose monitoring test strip   Commonly known as:  no brand specified   This may have changed:  Another medication with the same name was removed. Continue taking this medication, and follow the directions you see here.   Used for:  Type 1 diabetes mellitus with hyperglycemia (H)        Dose:  1 strip   1 strip by In Vitro route 6 times daily Use to test blood sugar 6 times daily or as directed.   Quantity:  8 Box   Refills:  12       ONETOUCH DELICA LANCETS 33G Misc   This may have changed:  Another medication with the same name was removed. Continue taking this medication, and follow the directions you see here.   Used for:  Type 1 diabetes mellitus with hyperglycemia (H)        Dose:  1 Box   1 Box 6 times daily   Quantity:  100 each   Refills:  12                Primary Care Provider Office Phone # Fax #    Kristen M Kehr, PA-C [phone]       [facility] [address] [facility] [address]        Thank you!     Thank you for choosing [facility]  for your care. Our goal is always to provide you with excellent care. Hearing back from our patients is one way we can continue to improve our services. Please take a few minutes to complete the written survey that you may receive in the mail after your visit with us. Thank you!             Your Updated Medication List - Protect others around you: Learn " how to safely use, store and throw away your medicines at www.disposemymeds.org.          This list is accurate as of: 5/8/17  4:26 PM.  Always use your most recent med list.                   Brand Name Dispense Instructions for use    blood glucose monitoring test strip    no brand specified    8 Box    1 strip by In Vitro route 6 times daily Use to test blood sugar 6 times daily or as directed.       * DEXCOM G4 PLATINUM TRANSMITTER Kit     1 kit    CHANGE EVERY 6 MONTHS       * DEXCOM G5 MOB/G4 PLAT SENSOR Kit     1 kit    CHANGE EVERY 7 DAYS       fluticasone 50 MCG/ACT spray    FLONASE    1 Package    Spray 1-2 sprays into both nostrils daily       glucagon 1 MG kit    GLUCAGON EMERGENCY    1 mg    Inject 1 mg into the muscle once for 1 dose       * insulin aspart 100 UNITS/ML injection    NovoLOG VIAL    6 vial    To use with her insulin pump.       * NovoLOG FLEXPEN 100 UNIT/ML injection   Generic drug:  insulin aspart     9 mL    1 UNIT/10GM OF CARBS WITH EACH MEAL PLUS SCALE. MAX OF 50 UNITS PER DAY       insulin degludec 200 UNIT/ML pen    TRESIBA    18 mL    Inject 30 Units Subcutaneous daily       insulin lispro 100 UNIT/ML injection    humaLOG    1 vial    For insulin pump usage.       insulin pen needle 31G X 6 MM     200 each    Use 6 daily or as directed.       levothyroxine 112 MCG tablet    SYNTHROID    90 tablet    Take 1 tablet (112 mcg) by mouth daily       ONETOUCH DELICA LANCETS 33G Misc     100 each    1 Box 6 times daily       * Notice:  This list has 4 medication(s) that are the same as other medications prescribed for you. Read the directions carefully, and ask your doctor or other care provider to review them with you.

## 2017-05-24 NOTE — TELEPHONE ENCOUNTER
5/24/2017    Call Regarding Preventive Health Screening Cervical/PAP    Attempt 3    Message on voicemail     Comments:       Outreach   CC

## 2017-06-16 ENCOUNTER — TRANSFERRED RECORDS (OUTPATIENT)
Dept: HEALTH INFORMATION MANAGEMENT | Facility: CLINIC | Age: 42
End: 2017-06-16

## 2017-07-28 ENCOUNTER — ALLIED HEALTH/NURSE VISIT (OUTPATIENT)
Dept: EDUCATION SERVICES | Facility: CLINIC | Age: 42
End: 2017-07-28
Payer: COMMERCIAL

## 2017-07-28 DIAGNOSIS — E10.65 TYPE 1 DIABETES MELLITUS WITH HYPERGLYCEMIA (H): ICD-10-CM

## 2017-07-28 PROCEDURE — G0108 DIAB MANAGE TRN  PER INDIV: HCPCS

## 2017-07-28 PROCEDURE — 99207 ZZC NO CHARGE LOS: CPT

## 2017-07-28 RX ORDER — LANCETS 33 GAUGE
1 EACH MISCELLANEOUS
Qty: 100 EACH | Refills: 12 | Status: SHIPPED | OUTPATIENT
Start: 2017-07-28

## 2017-07-28 NOTE — Clinical Note
Just RICHA ,she has many questions regarding her flying next month.  Those are taken care of.  Downloaded her meter, she really jumps up with her period.  She wants to try to increase Tresiba by 2 units to see if this helps.  Also she does get high exercising and then crashes.  We discussed about the adrenalin may cause some of that.  She really loves her Dexcom, but is good about checking BG if she questions it. Looking into the Proctor pump in the future, did not like Medtronic.

## 2017-07-28 NOTE — PROGRESS NOTES
Diabetes Self Management Training: Follow-up Visit    Brigitte Robles presents today for education, evaluation of glucose control and modification of medication(s) related to Type 1 diabetes.    She is accompanied by self    Patient's diabetes management related comments/concerns: concerned regarding her flying to [place] in Aug.      Patient would like this visit to be focused around the following diabetes-related behaviors and goals: Healthy Eating, Being Active, Monitoring and Taking Medication    ASSESSMENT:  Patient Problem List reviewed for relevant medical history and current medical status.    Current Diabetes Management per Patient:  Taking diabetes medications?   yes:     Diabetes Medication(s)     Diabetic Other Sig    glucagon (GLUCAGON EMERGENCY) 1 MG injection Inject 1 mg into the muscle once for 1 dose    Insulin Sig    insulin aspart (NOVOLOG FLEXPEN) 100 UNIT/ML injection Inject 15 Units Subcutaneous 3 times daily (with meals)    insulin aspart (NOVOLOG FLEXPEN) 100 UNIT/ML injection 1 unit/10 gms of carbs with each meal, plus the scale Max usage of insulin is 50 units daily.    insulin degludec (TRESIBA) 200 UNIT/ML pen Inject 30 Units Subcutaneous daily    NOVOLOG FLEXPEN 100 UNIT/ML soln 1 UNIT/10GM OF CARBS WITH EACH MEAL PLUS SCALE. MAX OF 50 UNITS PER DAY    insulin aspart (NOVOLOG VIAL) 100 UNITS/ML injection To use with her insulin pump.     Patient not taking:  Reported on 5/8/2017      , Injectable Medications: Tresiba 0-0-0-30, may increase to 32 if having her period and NovoLog Insulin 1/10 gms of carb plus a scale.      *Abbreviated insulin dose documentation key: Insulin Trade Name (acegehplv-iuztg-alhzll-bedtime) - i.e. Humalog 5-5-5-0 (Humalog 5 units at breakfast, 5 units at lunch, and 5 units at dinner).    Patient glucose self monitoring as follows: 8-10 times daily.   BG meter: One Touch Verio Flex meter  BG results: Download information.     BG values are: Not in  "goal  Patient's most recent   Lab Results   Component Value Date    A1C 7.2 05/08/2017    is not meeting goal of <7.0    Nutrition:  Patient eats 3 meals per day      Cultural/Mu-ism diet restrictions: No     Biggest Challenge to Healthy Eating: none    Physical Activity:    Type: walking of her dog, or working out at the gym.      Diabetes Complications:  Not discussed today.    Vitals:  There were no vitals taken for this visit.  Estimated body mass index is 31.17 kg/(m^2) as calculated from the following:    Height as of 5/8/17: 1.632 m (5' 4.25\").    Weight as of 5/8/17: 83 kg (183 lb).   Last 3 BP:   BP Readings from Last 3 Encounters:   05/08/17 133/77   01/23/17 142/75   09/12/16 126/81       History   Smoking Status     Never Smoker   Smokeless Tobacco     Never Used       Labs:  Lab Results   Component Value Date    A1C 7.2 05/08/2017     Lab Results   Component Value Date     02/10/2016     Lab Results   Component Value Date    LDL 67 07/28/2015     HDL Cholesterol   Date Value Ref Range Status   07/28/2015 60 >50 mg/dL Final   ]  GFR Estimate   Date Value Ref Range Status   02/10/2016 87 >60 mL/min/1.7m2 Final     Comment:     Non  GFR Calc     GFR Estimate If Black   Date Value Ref Range Status   02/10/2016 >90   GFR Calc   >60 mL/min/1.7m2 Final     Lab Results   Component Value Date    CR 0.74 02/10/2016     No results found for: MICROALBUMIN    Health Beliefs and Attitudes:   Patient Activation Measure Survey Score:  TEDDY Score (Last Two) 3/6/2013   TEDDY Raw Score 39   Activation Score 56.4   TEDDY Level 3       Stage of Change: ACTION (Actively working towards change)    Progress toward meeting diabetes-related behavioral goals:    GOALS % Met Goal   Healthy Eating  100   Physical Activity  100   Monitoring  100   Medication Taking  100   Problem Solving  75   Healthy Coping  100   Risk Reduction             Diabetes knowledge and skills assessment:     Patient " is knowledgeable in diabetes management concepts related to: Healthy Eating, Being Active, Monitoring, Taking Medication and Problem Solving    Patient needs further education on the following diabetes management concepts: Taking Medication    Barriers to Learning Assessment: No Barriers identified    Based on learning assessment above, most appropriate setting for further diabetes education would be: Individual setting.    INTERVENTION:    Education provided today on:  AADE Self-Care Behaviors:  Healthy Eating: carbohydrate counting, consistency in amount, composition, and timing of food intake, weight reduction, heart healthy diet, eating out, portion control, plate planning method and label reading  Being Active: relationship to blood glucose and describe appropriate activity program  Monitoring: purpose, proper technique, log and interpret results, individual blood glucose targets and frequency of monitoring  Taking Medication: action of prescribed medication, drawing up, administering and storing injectable diabetes medications, proper site selection and rotation for injections, side effects of prescribed medications and when to take medications    Opportunities for ongoing education and support in diabetes-self management were discussed.    Pt verbalized understanding of concepts discussed and recommendations provided today.       Education Materials Provided:  Pump information.     PLAN:  See Patient Instructions for co-developed, patient-stated behavior change goals.  AVS printed and provided to patient today.    FOLLOW-UP:  Follow-up with endocrinology recommended.  Follow-up as needed.   Chart routed to referring provider.    Ongoing plan for education and support: Follow-up visit with diabetes educator in [place]    Rosio Knight RN/KIRAN Keys Diabetes Educator    Time Spent: 60 minutes  Encounter Type: Individual    Any diabetes medication dose changes were made via the TEREE Protocol and Collaborative  Practice Agreement with the patient's primary care provider and endocrinology provider. A copy of this encounter was shared with the provider.

## 2017-07-28 NOTE — MR AVS SNAPSHOT
"              After Visit Summary   7/28/2017    Brigitte Robles    MRN: [id number]           Patient Information     Date Of Birth          1975        Visit Information        Provider Department      7/28/2017 3:00 PM AN DIABETIC ED RESOURCE [facility]        Today's Diagnoses     Type 1 diabetes mellitus with hyperglycemia (H)          Care Instructions    My Diabetes Care Goals:    Being Active: check BG before you exercise, then wait 30 minutes after exercise and recheck.   If you are going down to low, then take 1-2 units less of Novolog with the meal before.      Monitoring: As you have been doing.      Taking Medication: Tresiba U200 try 32 units when you are hormonal.  Other wise 30 units.    Novolog 1 units / 10 gms , or 1 unit/ 8 gms of carbs.  Plus your scale    Check out the Echo pen.    Flying call your agent for sure, have her tell you about the \"fast pass\"  Carry on all of your diabetes equipment.  Do not go through the body scan machine, have them wand you.      Follow up:  Follow up with Dr. Desai and call me if you need more help here.       Bring blood glucose meter and logbook with you to all doctor and follow-up appointments.     [facility] for the [facility]:  For Your Diabetes Education and Nutrition Appointments Call:  [phone]   For Diabetes Education or Nutrition Related Questions:   Phone: [phone]  E-mail: [email or web address]  Fax: [phone]   If you need a medication refill please contact your pharmacy. Please allow 3 business days for your refills to be completed.    Instructions for emailing the Diabetes Educators    If you need to communicate a non-urgent message to a Diabetes Educator via email, please send to [email or web address].    Please follow the following email guidelines:    Subject line: Secure: your clinic name (example: Secure: [place])  In the email please include: First name, middle " initial, last name and date of birth.    We will be in touch with you within one (1) business day.             Follow-ups after your visit        Your next 10 appointments already scheduled     Sep 18, 2017  3:45 PM CDT   Return Visit with Lloyd Ledesma MD, MG ENDO NURSE   [facility]    [id number] [place] [address] [phone]              Who to contact     If you have questions or need follow up information about today's clinic visit or your schedule please contact [facility] directly at [phone].  Normal or non-critical lab and imaging results will be communicated to you by MyChart, letter or phone within 4 business days after the clinic has received the results. If you do not hear from us within 7 days, please contact the clinic through Codementorhart or phone. If you have a critical or abnormal lab result, we will notify you by phone as soon as possible.  Submit refill requests through [employer] or call your pharmacy and they will forward the refill request to us. Please allow 3 business days for your refill to be completed.          Additional Information About Your Visit        Codementorhart Information     [employer] gives you secure access to your electronic health record. If you see a primary care provider, you can also send messages to your care team and make appointments. If you have questions, please call your primary care clinic.  If you do not have a primary care provider, please call [phone] and they will assist you.        Care EveryWhere ID     This is your Care EveryWhere ID. This could be used by other organizations to access your [place] medical records  [id number]         Blood Pressure from Last 3 Encounters:   05/08/17 133/77   01/23/17 142/75   09/12/16 126/81    Weight from Last 3 Encounters:   05/08/17 83 kg (183 lb)   01/23/17 79.1 kg (174 lb 6.4 oz)   09/12/16 76.9 kg (169 lb 8 oz)              Today,  you had the following     No orders found for display         Today's Medication Changes          These changes are accurate as of: 7/28/17  4:06 PM.  If you have any questions, ask your nurse or doctor.               These medicines have changed or have updated prescriptions.        Dose/Directions    blood glucose monitoring test strip   Commonly known as:  ONE TOUCH VERIO IQ   This may have changed:    - how much to take  - how to take this  - when to take this  - additional instructions   Used for:  Type 1 diabetes mellitus with hyperglycemia (H)        Use to test blood sugar 10 times daily or as directed.  Ok to substitute alternative if insurance prefers.   Quantity:  300 strip   Refills:  11       * insulin aspart 100 UNIT/ML injection   Commonly known as:  NovoLOG FLEXPEN   This may have changed:  See the new instructions.   Used for:  Type 1 diabetes mellitus with hyperglycemia (H)        Dose:  15 Units   Inject 15 Units Subcutaneous 3 times daily (with meals)   Quantity:  9 mL   Refills:  3       * insulin aspart 100 UNIT/ML injection   Commonly known as:  NovoLOG FLEXPEN   This may have changed:  You were already taking a medication with the same name, and this prescription was added. Make sure you understand how and when to take each.   Used for:  Type 1 diabetes mellitus with hyperglycemia (H)        1 unit/10 gms of carbs with each meal, plus the scale Max usage of insulin is 50 units daily.   Quantity:  30 mL   Refills:  3       * Notice:  This list has 2 medication(s) that are the same as other medications prescribed for you. Read the directions carefully, and ask your doctor or other care provider to review them with you.         Where to get your medicines      These medications were sent to [facility]/pharmacy #[id number] - [facility] [address],  [address] [place] [address] [address], [facility] [address]     Phone:  [phone]     blood glucose monitoring test strip     insulin aspart 100 UNIT/ML injection    insulin aspart 100 UNIT/ML injection    insulin pen needle 31G X 6 MM    ONETOUCH DELICA LANCETS 33G [facility]                Primary Care Provider Office Phone # Fax #    Kristen M Kehr, PA-C [phone]       [facility] [address] [facility] [address]        Equal Access to Services     KIN EARL : Hadii aad ku hadasho Soomaali, waaxda luqadaha, qaybta kaalmada adeegyada, waxay idiin hayaan adeeg kharash la'aan ah. So [facility] [phone].    ATENCIÓN: Si habla español, tiene a espinosa disposición servicios gratuitos de asistencia lingüística. Radhaame al [phone].    We comply with applicable federal civil rights laws and [place] laws. We do not discriminate on the basis of race, color, national origin, age, disability sex, sexual orientation or gender identity.            Thank you!     Thank you for choosing [facility]  for your care. Our goal is always to provide you with excellent care. Hearing back from our patients is one way we can continue to improve our services. Please take a few minutes to complete the written survey that you may receive in the mail after your visit with us. Thank you!             Your Updated Medication List - Protect others around you: Learn how to safely use, store and throw away your medicines at www.disposemymeds.org.          This list is accurate as of: 7/28/17  4:06 PM.  Always use your most recent med list.                   Brand Name Dispense Instructions for use Diagnosis    blood glucose monitoring test strip    ONE TOUCH VERIO IQ    300 strip    Use to test blood sugar 10 times daily or as directed.  Ok to substitute alternative if insurance prefers.    Type 1 diabetes mellitus with hyperglycemia (H)       DEXCOM G4 PLATINUM TRANSMITTER Kit     1 kit    CHANGE EVERY 6 MONTHS    Type 1 diabetes mellitus without complication (H)       DEXCOM G5 MOB/G4 PLAT SENSOR Mis     12 each    1 patch  every 7 days Please replace sensor patch every seven days.    Type 1 diabetes mellitus without complication (H)       fluticasone 50 MCG/ACT spray    FLONASE    1 Package    Spray 1-2 sprays into both nostrils daily    Seasonal allergic rhinitis       glucagon 1 MG kit    GLUCAGON EMERGENCY    1 mg    Inject 1 mg into the muscle once for 1 dose    Type 1 diabetes mellitus without complication (H)       * insulin aspart 100 UNIT/ML injection    NovoLOG FLEXPEN    9 mL    Inject 15 Units Subcutaneous 3 times daily (with meals)    Type 1 diabetes mellitus with hyperglycemia (H)       * insulin aspart 100 UNIT/ML injection    NovoLOG FLEXPEN    30 mL    1 unit/10 gms of carbs with each meal, plus the scale Max usage of insulin is 50 units daily.    Type 1 diabetes mellitus with hyperglycemia (H)       insulin degludec 200 UNIT/ML pen    TRESIBA    18 mL    Inject 30 Units Subcutaneous daily    Type 1 diabetes mellitus without complication (H)       insulin pen needle 31G X 6 MM     200 each    Use 6 daily or as directed.    Type 1 diabetes mellitus with hyperglycemia (H)       ONETOUCH DELICA LANCETS 33G Misc     100 each    1 Box 6 times daily    Type 1 diabetes mellitus with hyperglycemia (H)       SYNTHROID 112 MCG tablet   Generic drug:  levothyroxine     90 tablet    Take 1 tablet (112 mcg) by mouth daily    Hypothyroidism, unspecified type, Type 1 diabetes mellitus without complication (H)       * Notice:  This list has 2 medication(s) that are the same as other medications prescribed for you. Read the directions carefully, and ask your doctor or other care provider to review them with you.

## 2017-07-28 NOTE — PATIENT INSTRUCTIONS
"My Diabetes Care Goals:    Being Active: check BG before you exercise, then wait 30 minutes after exercise and recheck.   If you are going down to low, then take 1-2 units less of Novolog with the meal before.      Monitoring: As you have been doing.      Taking Medication: Tresiba U200 try 32 units when you are hormonal.  Other wise 30 units.    Novolog 1 units / 10 gms , or 1 unit/ 8 gms of carbs.  Plus your scale    Check out the Echo pen.    Flying call your agent for sure, have her tell you about the \"fast pass\"  Carry on all of your diabetes equipment.  Do not go through the body scan machine, have them wand you.      Follow up:  Follow up with Dr. Desai and call me if you need more help here.       Bring blood glucose meter and logbook with you to all doctor and follow-up appointments.     [facility] for the [facility] Area:  For Your Diabetes Education and Nutrition Appointments Call:  [phone]   For Diabetes Education or Nutrition Related Questions:   Phone: [phone]  E-mail: [email or web address]  Fax: [phone]   If you need a medication refill please contact your pharmacy. Please allow 3 business days for your refills to be completed.    Instructions for emailing the Diabetes Educators    If you need to communicate a non-urgent message to a Diabetes Educator via email, please send to [email or web address].    Please follow the following email guidelines:    Subject line: Secure: your clinic name (example: Secure: [place])  In the email please include: First name, middle initial, last name and date of birth.    We will be in touch with you within one (1) business day.     "

## 2017-07-29 ENCOUNTER — HEALTH MAINTENANCE LETTER (OUTPATIENT)
Age: 42
End: 2017-07-29

## 2017-09-18 ENCOUNTER — OFFICE VISIT (OUTPATIENT)
Dept: ENDOCRINOLOGY | Facility: CLINIC | Age: 42
End: 2017-09-18
Payer: COMMERCIAL

## 2017-09-18 VITALS
HEART RATE: 81 BPM | HEIGHT: 64 IN | DIASTOLIC BLOOD PRESSURE: 92 MMHG | BODY MASS INDEX: 33.08 KG/M2 | WEIGHT: 193.78 LBS | SYSTOLIC BLOOD PRESSURE: 147 MMHG

## 2017-09-18 DIAGNOSIS — E03.9 HYPOTHYROIDISM, UNSPECIFIED TYPE: ICD-10-CM

## 2017-09-18 DIAGNOSIS — E10.9 TYPE 1 DIABETES MELLITUS WITHOUT COMPLICATION (H): ICD-10-CM

## 2017-09-18 LAB
CHOLEST SERPL-MCNC: 159 MG/DL
CREAT SERPL-MCNC: 0.74 MG/DL (ref 0.52–1.04)
CREAT UR-MCNC: 46 MG/DL
GFR SERPL CREATININE-BSD FRML MDRD: 86 ML/MIN/1.7M2
HBA1C MFR BLD: 7.2 % (ref 0–5.7)
HBA1C MFR BLD: 7.4 % (ref 4.3–6)
HDLC SERPL-MCNC: 77 MG/DL
LDLC SERPL CALC-MCNC: 63 MG/DL
MICROALBUMIN UR-MCNC: <5 MG/L
MICROALBUMIN/CREAT UR: NORMAL MG/G CR (ref 0–25)
NONHDLC SERPL-MCNC: 82 MG/DL
T4 FREE SERPL-MCNC: 1.02 NG/DL (ref 0.76–1.46)
TRIGL SERPL-MCNC: 93 MG/DL
TSH SERPL DL<=0.005 MIU/L-ACNC: 8.07 MU/L (ref 0.4–4)

## 2017-09-18 PROCEDURE — 80061 LIPID PANEL: CPT | Performed by: INTERNAL MEDICINE

## 2017-09-18 PROCEDURE — 84443 ASSAY THYROID STIM HORMONE: CPT | Performed by: INTERNAL MEDICINE

## 2017-09-18 PROCEDURE — 84439 ASSAY OF FREE THYROXINE: CPT | Performed by: INTERNAL MEDICINE

## 2017-09-18 PROCEDURE — 82043 UR ALBUMIN QUANTITATIVE: CPT | Performed by: INTERNAL MEDICINE

## 2017-09-18 PROCEDURE — 82565 ASSAY OF CREATININE: CPT | Performed by: INTERNAL MEDICINE

## 2017-09-18 PROCEDURE — 36415 COLL VENOUS BLD VENIPUNCTURE: CPT | Performed by: INTERNAL MEDICINE

## 2017-09-18 PROCEDURE — 99214 OFFICE O/P EST MOD 30 MIN: CPT | Performed by: INTERNAL MEDICINE

## 2017-09-18 PROCEDURE — 2894A HEMOGLOBIN A1C POCT: CPT | Performed by: INTERNAL MEDICINE

## 2017-09-18 PROCEDURE — 83036 HEMOGLOBIN GLYCOSYLATED A1C: CPT | Performed by: INTERNAL MEDICINE

## 2017-09-18 NOTE — PROGRESS NOTES
Endocrinology Note         Brigitte is a 42 year old female presents today for type 1 diabetes.    HPI  Ms Brigitte Robles is a 42 years old female who is here for type 1 diabetes    She was noted to have increased blurred vision and fatigue in February 2016 and noted to have blood glucose of 300s and A1C of 7.0%. She was initially diagnosed with type 2 diabetes and was started on metformin 500 mg bid which did not control her blood glucose. She was then seen by her PCP who ran more blood test and noted for low c-peptide and positive MONTSERRAT ab and IA2 antibody. She was then started on insulin.    She went on to [employer] trip in [place] and was able to manage it well with diabetes. She has not had severe hypoglycemic event. She is now confident that she can travel again and be able to manage it.    1) type 1 diabetes: she is currently on insulin degludec (Tresiba) 28-30 units at night, Novolog 1 unit per 10 gram and correction factor of 1 unit per 50 above 150 mg/dl.  A1C today is 7.2%.  Reviewed glucose log, average glucose 197 (SD 74). She still has elevated postprandial glucose particularly during having menstrual cycle. She reported having low blood glucose about 5 times per week and often overcorrected it if she used 1 unit per 50 above 120. She also wears Dexcom and used to it to guide her glucose during the day.    She is looking to restart insulin pump again in the future. She will think about it about it and contact CDE. She did try insulin pump (Medtronic 630G) earlier this year but did not like it because her glucose seemed to be more fluctuated.    2) hypothyroidism: She has history of hypothyroidism for the past 11 years. It was diagnosed about 6 months postpartum. She is currently on levothyroxine 112 mcg daily. TSH in 9/2016 was 1.7    Past Medical History  Type 1 diabetes  Hypothyroidism    Allergies  Allergies   Allergen Reactions     Levothyroxine      Dizzy to generic --- not to synthroid      Medications  Current Outpatient Prescriptions   Medication Sig Dispense Refill     insulin aspart (NOVOLOG FLEXPEN) 100 UNIT/ML injection Inject 15 Units Subcutaneous 3 times daily (with meals) 9 mL 3     insulin aspart (NOVOLOG FLEXPEN) 100 UNIT/ML injection 1 unit/10 gms of carbs with each meal, plus the scale Max usage of insulin is 50 units daily. 30 mL 3     insulin pen needle 31G X 6 MM Use 6 daily or as directed. 200 each 12     blood glucose monitoring (ONE TOUCH VERIO IQ) test strip Use to test blood sugar 10 times daily or as directed.  Ok to substitute alternative if insurance prefers. 300 strip 11     ONETOUCH DELICA LANCETS 33G MISC 1 Box 6 times daily 100 each 12     Continuous Blood Gluc Sensor (DEXCOM G5 MOB/G4 PLAT SENSOR) MISC 1 patch every 7 days Please replace sensor patch every seven days. 12 each 3     SYNTHROID 112 MCG tablet Take 1 tablet (112 mcg) by mouth daily 90 tablet 3     insulin degludec (TRESIBA) 200 UNIT/ML pen Inject 30 Units Subcutaneous daily 18 mL 3     Continuous Glucose Monitor (DEXCOM G4 PLATINUM TRANSMITTER) KIT CHANGE EVERY 6 MONTHS 1 kit 1     glucagon (GLUCAGON EMERGENCY) 1 MG injection Inject 1 mg into the muscle once for 1 dose 1 mg 11     fluticasone (FLONASE) 50 MCG/ACT nasal spray Spray 1-2 sprays into both nostrils daily 1 Package 11     Family History  Type 1 diabetes in her father, mother, paternal aunt, paternal uncle, brother and sister  Type 2 diabetes in her grandmother  Heart in her maternal grandfather and maternal grandmother  hypothyroid in her brother, father, maternal grandfather, maternal grandmother, mother, and sister.     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    ROS  Constitutional: feeling well so far, working on weight loss  Eyes: no vision change, diplopia, no more blurred vision  Cardiovascular: no chest pain, palpitations  Respiratory: no dyspnea, cough, shortness  "of breath  GI: no nausea, vomiting, diarrhea or constipation, no abdominal pain   : no change in urine, no dysuria   Musculoskeletal: no joint swelling   Integumentary: occasional hive  Neuro: no numbness or tingling, no tremor, no headache   Endo: no polyuria or polydipsia, no temperature intolerance   Psych: she denied snoring, sleep well    Physical Exam  Ht 1.632 m (5' 4.25\")  Wt 87.9 kg (193 lb 12.6 oz)  LMP 09/04/2017  BMI 33 kg/m2  Body mass index is 33 kg/(m^2).  Constitutional: no distress, comfortable, pleasant   Eyes: anicteric  Thyroid: not enlarged, no nodule appreciated  CVS: RRR, normal S1,S2, no murmur  Lungs: CTA B/L  Abd: soft, ND, NT, no hepatomegaly  Musculoskeletal: no edema   Skin: no jaundice   Neurological: normal gait, no tremor, intact sensation per monofilament test bilateral feet  Psychological: appropriate mood     RESULTS  Lab Results   Component Value Date    A1C 7.2 09/18/2017    A1C 7.2 05/08/2017    A1C 7.1 01/23/2017    A1C 7.1 09/12/2016    A1C 7.0 02/05/2016        Ref. Range 2/5/2016 11:41   Glutamic Acid Decarboxylase Antibody Unknown 45.7 (H)   IA-2 Antibody Unknown 3.4 (H)   Islet Cell Antibody IgG Unknown <1:4...      Ref. Range 2/10/2016 08:35   C-Peptide Latest Ref Range: 0.9-6.9 ng/mL 0.7 (L)     ENDO DIABETES Latest Ref Rng 2/10/2016   CREATININE 0.52 - 1.04 mg/dL 0.74     ENDO THYROID LABS-UMP Latest Ref Rng & Units 9/12/2016   TSH 0.40 - 4.00 mU/L 1.70     ASSESSMENT:    Ms Brigitte Robles is a 42 years old female who is here for follow up type 1 diabetes    1) type 1 diabetes: diagnosed type 1 diabetes in Feb 2016.   - she is not interested in insulin pump at this time.  - will continue with Tresiba 28-30 units daily  - continue with Novolog 1 unit per 10 gram and correction factor of 1 unit per 50 above 150 mg/dl.  - she will continue to check blood glucose 3-4 times per day   - she will rethink about restarting on insulin pump again and will contact CDE.    2) " Hypothyroidism: clinically and biochemically euthyroid. Continue with LT4 112 mcg daily.     PLAN:   - continue with insulin degludec (Tresiba) 28-30 units daily and Novolog 1 unit per 10 gram and correction factor 1 unit per 50 above 150 mg/dl  - lab today  - she will rethink about insulin pump  - RTC 4 months    Lloyd Ledesma MD  Endocrinology  [phone]

## 2017-09-18 NOTE — PATIENT INSTRUCTIONS
Please allow 7-10 business days for your lab results. You will be notified by phone, letter, or My Chart after the provider has reviewed them.  Thank you.       Sending blood sugars to your provider at [facility]:  We want to help you with your diabetes management, which often requires frequent adjustments to your therapy. For your convenience, we have several ways to send your blood sugars to your doctor for review.    - Send message directly to your doctor through My Chart.  Please ask the rooming staff if you would like to sign up for My Chart.  This is a fast and confidential way to send your information and communicate directly with your provider.   - Record readings and fax to [phone].  We have a template for you to use for your convenience.  - If you have a Medtronic pump, upload to [employer] and notify your provider of your username and password.   - Stop by the clinic with your meter for download.   - My Chart or call Fatou Arora, Diabetes Educator at [phone]  - Call the clinic and speak to one of the endocrine nurses to relay information on the telephone.  Cora Castanon, or Zuleyma at [phone].   -    Please call the on-call Endocrinologist at the [facility] for after       hours/weekend needs at [phone] Option #4.    Please note that you do not need to FAST if you are just having an A1C drawn. Please remember to ALWAYS bring your glucose meter with to your appointment. This data is very important for the management of your care.    Thank you!  Your [facility] [facility] Diabetes Care Team

## 2017-09-18 NOTE — NURSING NOTE
"Brigitte Robles's goals for this visit include: Follow up Diabetes  She requests these members of her care team be copied on today's visit information: YES    PCP: Kehr, Kristen M    Referring Provider:  No referring provider defined for this encounter.    Chief Complaint   Patient presents with     Diabetes       Initial Ht 1.632 m (5' 4.25\")  Wt 87.9 kg (193 lb 12.6 oz)  LMP 09/04/2017  BMI 33 kg/m2 Estimated body mass index is 33 kg/(m^2) as calculated from the following:    Height as of this encounter: 1.632 m (5' 4.25\").    Weight as of this encounter: 87.9 kg (193 lb 12.6 oz).  Medication Reconciliation: complete    Do you need any medication refills at today's visit? NO    "

## 2017-09-18 NOTE — MR AVS SNAPSHOT
After Visit Summary   9/18/2017    Brigitte Robles    MRN: [id number]           Patient Information     Date Of Birth          1975        Visit Information        Provider Department      9/18/2017 3:45 PM Lloyd Ledesma MD; [facility]        Today's Diagnoses     Type 1 diabetes mellitus without complication (H)          Care Instructions    Please allow 7-10 business days for your lab results. You will be notified by phone, letter, or My Chart after the provider has reviewed them.  Thank you.       Sending blood sugars to your provider at [place]:  We want to help you with your diabetes management, which often requires frequent adjustments to your therapy. For your convenience, we have several ways to send your blood sugars to your doctor for review.    - Send message directly to your doctor through My Chart.  Please ask the rooming staff if you would like to sign up for My Chart.  This is a fast and confidential way to send your information and communicate directly with your provider.   - Record readings and fax to [phone].  We have a template for you to use for your convenience.  - If you have a Medtronic pump, upload to Talknote and notify your provider of your username and password.   - Stop by the clinic with your meter for download.   - My Chart or call Fatou Arora, Diabetes Educator at [phone]  - Call the clinic and speak to one of the endocrine nurses to relay information on the telephone.  Cora Castanon, or Zuleyma at [phone].   -    Please call the on-call Endocrinologist at the [facility] for after       hours/weekend needs at [phone] Option #4.    Please note that you do not need to FAST if you are just having an A1C drawn. Please remember to ALWAYS bring your glucose meter with to your appointment. This data is very important for the management of your care.    Thank you!  Your [place]  Team                Follow-ups after your visit        Follow-up notes from your care team     Return in about 4 months (around 1/18/2018).      Your next 10 appointments already scheduled     Sep 25, 2017  9:00 AM CDT   PHYSICAL with Kristen M Kehr, PA-C   [facility] ([facility])    [address] [facility] [address]   [phone]            Jan 29, 2018  4:00 PM CST   Return Visit with Lloyd Ledesma MD, MG ENDO NURSE   [facility] ([facility])    [address] [address] [place] [address] [phone]              Who to contact     If you have questions or need follow up information about today's clinic visit or your schedule please contact [facility] directly at [phone].  Normal or non-critical lab and imaging results will be communicated to you by ZipZaphart, letter or phone within 4 business days after the clinic has received the results. If you do not hear from us within 7 days, please contact the clinic through ZipZaphart or phone. If you have a critical or abnormal lab result, we will notify you by phone as soon as possible.  Submit refill requests through [employer] or call your pharmacy and they will forward the refill request to us. Please allow 3 business days for your refill to be completed.          Additional Information About Your Visit        ZipZaphart Information     [employer] gives you secure access to your electronic health record. If you see a primary care provider, you can also send messages to your care team and make appointments. If you have questions, please call your primary care clinic.  If you do not have a primary care provider, please call [phone] and they will assist you.      [employer] is an electronic gateway that provides easy, online access to your medical records. With [employer], you can request a clinic appointment, read your test results, renew a prescription or communicate with  "your care team.     To access your existing account, please contact your [facility] or call [phone] for assistance.        Care EveryWhere ID     This is your Care EveryWhere ID. This could be used by other organizations to access your [place] medical records  [id number]        Your Vitals Were     Pulse Height Last Period BMI (Body Mass Index)          81 1.632 m (5' 4.25\") 09/04/2017 33 kg/m2         Blood Pressure from Last 3 Encounters:   09/18/17 (!) 147/92   05/08/17 133/77   01/23/17 142/75    Weight from Last 3 Encounters:   09/18/17 87.9 kg (193 lb 12.6 oz)   05/08/17 83 kg (183 lb)   01/23/17 79.1 kg (174 lb 6.4 oz)              We Performed the Following     Hemoglobin A1c POCT        Primary Care Provider Office Phone # Fax #    Kristen M Kehr, PA-C [phone]       [address] [facility] [address]        Equal Access to Services     [facility]: Hadii aad ku hadasho Soomaali, waaxda luqadaha, qaybta kaalmada adeegyada, waxoral luna haykaylee luna . So [facility] [phone].    ATENCIÓN: Si habla español, tiene a espinosa disposición servicios gratuitos de asistencia lingüística. Llame al [phone].    We comply with applicable federal civil rights laws and [place] laws. We do not discriminate on the basis of race, color, national origin, age, disability sex, sexual orientation or gender identity.            Thank you!     Thank you for choosing [facility]  for your care. Our goal is always to provide you with excellent care. Hearing back from our patients is one way we can continue to improve our services. Please take a few minutes to complete the written survey that you may receive in the mail after your visit with us. Thank you!             Your Updated Medication List - Protect others around you: Learn how to safely use, store and throw away your medicines at www.disposemymeds.org.          This list is accurate " as of: 9/18/17  4:24 PM.  Always use your most recent med list.                   Brand Name Dispense Instructions for use Diagnosis    blood glucose monitoring test strip    ONE TOUCH VERIO IQ    300 strip    Use to test blood sugar 10 times daily or as directed.  Ok to substitute alternative if insurance prefers.    Type 1 diabetes mellitus with hyperglycemia (H)       DEXCOM G4 PLATINUM TRANSMITTER Kit     1 kit    CHANGE EVERY 6 MONTHS    Type 1 diabetes mellitus without complication (H)       DEXCOM G5 MOB/G4 PLAT SENSOR Misc     12 each    1 patch every 7 days Please replace sensor patch every seven days.    Type 1 diabetes mellitus without complication (H)       fluticasone 50 MCG/ACT spray    FLONASE    1 Package    Spray 1-2 sprays into both nostrils daily    Seasonal allergic rhinitis       glucagon 1 MG kit    GLUCAGON EMERGENCY    1 mg    Inject 1 mg into the muscle once for 1 dose    Type 1 diabetes mellitus without complication (H)       insulin aspart 100 UNIT/ML injection    NovoLOG FLEXPEN    30 mL    1 unit/10 gms of carbs with each meal, plus the scale Max usage of insulin is 50 units daily.    Type 1 diabetes mellitus with hyperglycemia (H)       insulin degludec 200 UNIT/ML pen    TRESIBA    18 mL    Inject 30 Units Subcutaneous daily    Type 1 diabetes mellitus without complication (H)       insulin pen needle 31G X 6 MM     200 each    Use 6 daily or as directed.    Type 1 diabetes mellitus with hyperglycemia (H)       ONETOUCH DELICA LANCETS 33G Misc     100 each    1 Box 6 times daily    Type 1 diabetes mellitus with hyperglycemia (H)       SYNTHROID 112 MCG tablet   Generic drug:  levothyroxine     90 tablet    Take 1 tablet (112 mcg) by mouth daily    Hypothyroidism, unspecified type, Type 1 diabetes mellitus without complication (H)

## 2017-09-18 NOTE — LETTER
9/18/2017      RE: Brigitte Robles  [address] [facility] [id number]     Dear Colleague,    Thank you for referring your patient, Brigitte Robles, to the [facility]. Please see a copy of my visit note below.         Endocrinology Note         Brigitte is a 42 year old female presents today for type 1 diabetes.    HPI  Ms Brigitte Robles is a 42 years old female who is here for type 1 diabetes    She was noted to have increased blurred vision and fatigue in February 2016 and noted to have blood glucose of 300s and A1C of 7.0%. She was initially diagnosed with type 2 diabetes and was started on metformin 500 mg bid which did not control her blood glucose. She was then seen by her PCP who ran more blood test and noted for low c-peptide and positive MONTSERRAT ab and IA2 antibody. She was then started on insulin.    She went on to [employer] trip in [place] and was able to manage it well with diabetes. She has not had severe hypoglycemic event. She is now confident that she can travel again and be able to manage it.    1) type 1 diabetes: she is currently on insulin degludec (Tresiba) 28-30 units at night, Novolog 1 unit per 10 gram and correction factor of 1 unit per 50 above 150 mg/dl.  A1C today is 7.2%.  Reviewed glucose log, average glucose 197 (SD 74). She still has elevated postprandial glucose particularly during having menstrual cycle. She reported having low blood glucose about 5 times per week and often overcorrected it if she used 1 unit per 50 above 120. She also wears Dexcom and used to it to guide her glucose during the day.    She is looking to restart insulin pump again in the future. She will think about it about it and contact CDE. She did try insulin pump (Medtronic 630G) earlier this year but did not like it because her glucose seemed to be more fluctuated.    2) hypothyroidism: She has history of hypothyroidism for the past 11 years. It was diagnosed about 6 months postpartum. She  is currently on levothyroxine 112 mcg daily. TSH in 9/2016 was 1.7    Past Medical History  Type 1 diabetes  Hypothyroidism    Allergies  Allergies   Allergen Reactions     Levothyroxine      Dizzy to generic --- not to synthroid     Medications  Current Outpatient Prescriptions   Medication Sig Dispense Refill     insulin aspart (NOVOLOG FLEXPEN) 100 UNIT/ML injection Inject 15 Units Subcutaneous 3 times daily (with meals) 9 mL 3     insulin aspart (NOVOLOG FLEXPEN) 100 UNIT/ML injection 1 unit/10 gms of carbs with each meal, plus the scale Max usage of insulin is 50 units daily. 30 mL 3     insulin pen needle 31G X 6 MM Use 6 daily or as directed. 200 each 12     blood glucose monitoring (ONE TOUCH VERIO IQ) test strip Use to test blood sugar 10 times daily or as directed.  Ok to substitute alternative if insurance prefers. 300 strip 11     ONETOUCH DELICA LANCETS 33G MISC 1 Box 6 times daily 100 each 12     Continuous Blood Gluc Sensor (DEXCOM G5 MOB/G4 PLAT SENSOR) MISC 1 patch every 7 days Please replace sensor patch every seven days. 12 each 3     SYNTHROID 112 MCG tablet Take 1 tablet (112 mcg) by mouth daily 90 tablet 3     insulin degludec (TRESIBA) 200 UNIT/ML pen Inject 30 Units Subcutaneous daily 18 mL 3     Continuous Glucose Monitor (DEXCOM G4 PLATINUM TRANSMITTER) KIT CHANGE EVERY 6 MONTHS 1 kit 1     glucagon (GLUCAGON EMERGENCY) 1 MG injection Inject 1 mg into the muscle once for 1 dose 1 mg 11     fluticasone (FLONASE) 50 MCG/ACT nasal spray Spray 1-2 sprays into both nostrils daily 1 Package 11     Family History  Type 1 diabetes in her father, mother, paternal aunt, paternal uncle, brother and sister  Type 2 diabetes in her grandmother  Heart in her maternal grandfather and maternal grandmother  hypothyroid in her brother, father, maternal grandfather, maternal grandmother, mother, and sister.     Social History  No smoking  Drink alcohol occasionally  No illicit drug  , has 2  "children  Works in the lab    ROS  Constitutional: feeling well so far, working on weight loss  Eyes: no vision change, diplopia, no more blurred vision  Cardiovascular: no chest pain, palpitations  Respiratory: no dyspnea, cough, shortness of breath  GI: no nausea, vomiting, diarrhea or constipation, no abdominal pain   : no change in urine, no dysuria   Musculoskeletal: no joint swelling   Integumentary: occasional hive  Neuro: no numbness or tingling, no tremor, no headache   Endo: no polyuria or polydipsia, no temperature intolerance   Psych: she denied snoring, sleep well    Physical Exam  Ht 1.632 m (5' 4.25\")  Wt 87.9 kg (193 lb 12.6 oz)  LMP 09/04/2017  BMI 33 kg/m2  Body mass index is 33 kg/(m^2).  Constitutional: no distress, comfortable, pleasant   Eyes: anicteric  Thyroid: not enlarged, no nodule appreciated  CVS: RRR, normal S1,S2, no murmur  Lungs: CTA B/L  Abd: soft, ND, NT, no hepatomegaly  Musculoskeletal: no edema   Skin: no jaundice   Neurological: normal gait, no tremor, intact sensation per monofilament test bilateral feet  Psychological: appropriate mood     RESULTS  Lab Results   Component Value Date    A1C 7.2 09/18/2017    A1C 7.2 05/08/2017    A1C 7.1 01/23/2017    A1C 7.1 09/12/2016    A1C 7.0 02/05/2016        Ref. Range 2/5/2016 11:41   Glutamic Acid Decarboxylase Antibody Unknown 45.7 (H)   IA-2 Antibody Unknown 3.4 (H)   Islet Cell Antibody IgG Unknown <1:4...      Ref. Range 2/10/2016 08:35   C-Peptide Latest Ref Range: 0.9-6.9 ng/mL 0.7 (L)     ENDO DIABETES Latest Ref Rng 2/10/2016   CREATININE 0.52 - 1.04 mg/dL 0.74     ENDO THYROID LABS-UMP Latest Ref Rng & Units 9/12/2016   TSH 0.40 - 4.00 mU/L 1.70     ASSESSMENT:    Ms Brigitte Robles is a 42 years old female who is here for follow up type 1 diabetes    1) type 1 diabetes: diagnosed type 1 diabetes in Feb 2016.   - she is not interested in insulin pump at this time.  - will continue with Tresiba 28-30 units daily  - " continue with Novolog 1 unit per 10 gram and correction factor of 1 unit per 50 above 150 mg/dl.  - she will continue to check blood glucose 3-4 times per day   - she will rethink about restarting on insulin pump again and will contact CDE.    2) Hypothyroidism: clinically and biochemically euthyroid. Continue with LT4 112 mcg daily.     PLAN:   - continue with insulin degludec (Tresiba) 28-30 units daily and Novolog 1 unit per 10 gram and correction factor 1 unit per 50 above 150 mg/dl  - lab today  - she will rethink about insulin pump  - RTC 4 months    Lloyd Ledesma MD  Endocrinology  [phone]      Again, thank you for allowing me to participate in the care of your patient.      Sincerely,    Lloyd Ledesma MD

## 2017-09-25 ENCOUNTER — OFFICE VISIT (OUTPATIENT)
Dept: FAMILY MEDICINE | Facility: CLINIC | Age: 42
End: 2017-09-25
Payer: COMMERCIAL

## 2017-09-25 VITALS
BODY MASS INDEX: 32.87 KG/M2 | SYSTOLIC BLOOD PRESSURE: 138 MMHG | HEART RATE: 85 BPM | TEMPERATURE: 99.5 F | OXYGEN SATURATION: 100 % | WEIGHT: 193 LBS | DIASTOLIC BLOOD PRESSURE: 86 MMHG

## 2017-09-25 DIAGNOSIS — Z00.00 ROUTINE GENERAL MEDICAL EXAMINATION AT A HEALTH CARE FACILITY: Primary | ICD-10-CM

## 2017-09-25 DIAGNOSIS — Z23 NEED FOR PROPHYLACTIC VACCINATION AND INOCULATION AGAINST INFLUENZA: ICD-10-CM

## 2017-09-25 DIAGNOSIS — Z12.31 ENCOUNTER FOR SCREENING MAMMOGRAM FOR BREAST CANCER: ICD-10-CM

## 2017-09-25 DIAGNOSIS — N92.0 MENORRHAGIA WITH REGULAR CYCLE: ICD-10-CM

## 2017-09-25 DIAGNOSIS — E10.9 TYPE 1 DIABETES MELLITUS WITHOUT COMPLICATION (H): ICD-10-CM

## 2017-09-25 DIAGNOSIS — Z23 NEED FOR PNEUMOCOCCAL VACCINE: ICD-10-CM

## 2017-09-25 DIAGNOSIS — Z12.4 SCREENING FOR MALIGNANT NEOPLASM OF CERVIX: ICD-10-CM

## 2017-09-25 LAB
BASOPHILS # BLD AUTO: 0 10E9/L (ref 0–0.2)
BASOPHILS NFR BLD AUTO: 0.5 %
DIFFERENTIAL METHOD BLD: NORMAL
EOSINOPHIL # BLD AUTO: 0.1 10E9/L (ref 0–0.7)
EOSINOPHIL NFR BLD AUTO: 2.1 %
ERYTHROCYTE [DISTWIDTH] IN BLOOD BY AUTOMATED COUNT: 13.9 % (ref 10–15)
HCT VFR BLD AUTO: 36.1 % (ref 35–47)
HGB BLD-MCNC: 11.9 G/DL (ref 11.7–15.7)
LYMPHOCYTES # BLD AUTO: 1.9 10E9/L (ref 0.8–5.3)
LYMPHOCYTES NFR BLD AUTO: 32.1 %
MCH RBC QN AUTO: 28 PG (ref 26.5–33)
MCHC RBC AUTO-ENTMCNC: 33 G/DL (ref 31.5–36.5)
MCV RBC AUTO: 85 FL (ref 78–100)
MONOCYTES # BLD AUTO: 0.5 10E9/L (ref 0–1.3)
MONOCYTES NFR BLD AUTO: 8.7 %
NEUTROPHILS # BLD AUTO: 3.3 10E9/L (ref 1.6–8.3)
NEUTROPHILS NFR BLD AUTO: 56.6 %
PLATELET # BLD AUTO: 326 10E9/L (ref 150–450)
RBC # BLD AUTO: 4.25 10E12/L (ref 3.8–5.2)
WBC # BLD AUTO: 5.8 10E9/L (ref 4–11)

## 2017-09-25 PROCEDURE — 99396 PREV VISIT EST AGE 40-64: CPT | Mod: 25 | Performed by: PHYSICIAN ASSISTANT

## 2017-09-25 PROCEDURE — 90686 IIV4 VACC NO PRSV 0.5 ML IM: CPT | Performed by: PHYSICIAN ASSISTANT

## 2017-09-25 PROCEDURE — 90732 PPSV23 VACC 2 YRS+ SUBQ/IM: CPT | Performed by: PHYSICIAN ASSISTANT

## 2017-09-25 PROCEDURE — G0145 SCR C/V CYTO,THINLAYER,RESCR: HCPCS | Performed by: PHYSICIAN ASSISTANT

## 2017-09-25 PROCEDURE — 87624 HPV HI-RISK TYP POOLED RSLT: CPT | Performed by: PHYSICIAN ASSISTANT

## 2017-09-25 PROCEDURE — 90472 IMMUNIZATION ADMIN EACH ADD: CPT | Performed by: PHYSICIAN ASSISTANT

## 2017-09-25 PROCEDURE — 36415 COLL VENOUS BLD VENIPUNCTURE: CPT | Performed by: PHYSICIAN ASSISTANT

## 2017-09-25 PROCEDURE — 90471 IMMUNIZATION ADMIN: CPT | Performed by: PHYSICIAN ASSISTANT

## 2017-09-25 PROCEDURE — 85025 COMPLETE CBC W/AUTO DIFF WBC: CPT | Performed by: PHYSICIAN ASSISTANT

## 2017-09-25 ASSESSMENT — ANXIETY QUESTIONNAIRES
GAD7 TOTAL SCORE: 1
7. FEELING AFRAID AS IF SOMETHING AWFUL MIGHT HAPPEN: NOT AT ALL
6. BECOMING EASILY ANNOYED OR IRRITABLE: NOT AT ALL
5. BEING SO RESTLESS THAT IT IS HARD TO SIT STILL: NOT AT ALL
3. WORRYING TOO MUCH ABOUT DIFFERENT THINGS: NOT AT ALL
1. FEELING NERVOUS, ANXIOUS, OR ON EDGE: SEVERAL DAYS
IF YOU CHECKED OFF ANY PROBLEMS ON THIS QUESTIONNAIRE, HOW DIFFICULT HAVE THESE PROBLEMS MADE IT FOR YOU TO DO YOUR WORK, TAKE CARE OF THINGS AT HOME, OR GET ALONG WITH OTHER PEOPLE: NOT DIFFICULT AT ALL
2. NOT BEING ABLE TO STOP OR CONTROL WORRYING: NOT AT ALL

## 2017-09-25 ASSESSMENT — PATIENT HEALTH QUESTIONNAIRE - PHQ9
5. POOR APPETITE OR OVEREATING: NOT AT ALL
SUM OF ALL RESPONSES TO PHQ QUESTIONS 1-9: 0

## 2017-09-25 NOTE — PROGRESS NOTES
SUBJECTIVE:   CC: Brigitte Robles is an 42 year old woman who presents for preventive health visit.     Physical   Annual:     Getting at least 3 servings of Calcium per day::  Yes    Bi-annual eye exam::  Yes    Dental care twice a year::  Yes    Sleep apnea or symptoms of sleep apnea::  None    Diet::  Diabetic and Carbohydrate counting    Frequency of exercise::  6-7 days/week    Duration of exercise::  45-60 minutes    Taking medications regularly::  Yes    Medication side effects::  None    Additional concerns today::  No        Today's PHQ-2 Score: PHQ-2 ( 1999 Pfizer) 9/24/2017   Q1: Little interest or pleasure in doing things 0   Q2: Feeling down, depressed or hopeless 0   PHQ-2 Score 0   Q1: Little interest or pleasure in doing things Not at all   Q2: Feeling down, depressed or hopeless Not at all   PHQ-2 Score 0       Abuse: Current or Past(Physical, Sexual or Emotional)- No  Do you feel safe in your environment - Yes    Social History   Substance Use Topics     Smoking status: Never Smoker     Smokeless tobacco: Never Used     Alcohol use Yes      Comment: rare         Reviewed orders with patient.  Reviewed health maintenance and updated orders accordingly - Yes  BP Readings from Last 3 Encounters:   09/25/17 138/86   09/18/17 (!) 147/92   05/08/17 133/77    Wt Readings from Last 3 Encounters:   09/25/17 193 lb (87.5 kg)   09/18/17 193 lb 12.6 oz (87.9 kg)   05/08/17 183 lb (83 kg)                  Patient Active Problem List   Diagnosis     Depressive disorder, not elsewhere classified     Hypothyroidism     GLUCOCORTICOID DEFICIENT     Anxiety     CARDIOVASCULAR SCREENING; LDL GOAL LESS THAN 160     Seasonal allergic rhinitis     Type 1 diabetes mellitus without complication (H)     Past Surgical History:   Procedure Laterality Date     HC REMOVAL OF TONSILS,12+ Y/O         Social History   Substance Use Topics     Smoking status: Never Smoker     Smokeless tobacco: Never Used     Alcohol use Yes       Comment: rare     Family History   Problem Relation Age of Onset     DIABETES Mother      Hypertension Mother      Thyroid Disease Mother      DIABETES Father      1     Thyroid Disease Father      BORIS'S     DIABETES Sister      Thyroid Disease Sister      HEART DISEASE Maternal Grandmother      TRIPLE BYPASS     Thyroid Disease Maternal Grandmother      DIABETES Maternal Grandmother      2     HEART DISEASE Maternal Grandfather      TRIPLE BYPASS     Coronary Artery Disease Maternal Grandfather      Thyroid Disease Maternal Grandfather      Thyroid Disease Brother      DIABETES Paternal Aunt      DIABETES Paternal Uncle      DIABETES Brother      1     Thyroid Disease Brother      DIABETES Nephew      1     Depression Sister      Thyroid Disease Sister      DIABETES Sister      1     Breast Cancer No family hx of      Cancer - colorectal No family hx of          Current Outpatient Prescriptions   Medication Sig Dispense Refill     insulin aspart (NOVOLOG FLEXPEN) 100 UNIT/ML injection 1 unit/10 gms of carbs with each meal, plus the scale Max usage of insulin is 50 units daily. 30 mL 3     insulin pen needle 31G X 6 MM Use 6 daily or as directed. 200 each 12     blood glucose monitoring (ONE TOUCH VERIO IQ) test strip Use to test blood sugar 10 times daily or as directed.  Ok to substitute alternative if insurance prefers. 300 strip 11     ONETOUCH DELICA LANCETS 33G MISC 1 Box 6 times daily 100 each 12     Continuous Blood Gluc Sensor (DEXCOM G5 MOB/G4 PLAT SENSOR) MISC 1 patch every 7 days Please replace sensor patch every seven days. 12 each 3     SYNTHROID 112 MCG tablet Take 1 tablet (112 mcg) by mouth daily 90 tablet 3     insulin degludec (TRESIBA) 200 UNIT/ML pen Inject 30 Units Subcutaneous daily 18 mL 3     Continuous Glucose Monitor (DEXCOM G4 PLATINUM TRANSMITTER) KIT CHANGE EVERY 6 MONTHS 1 kit 1     fluticasone (FLONASE) 50 MCG/ACT nasal spray Spray 1-2 sprays into both nostrils daily 1  Package 11     glucagon (GLUCAGON EMERGENCY) 1 MG injection Inject 1 mg into the muscle once for 1 dose 1 mg 11     Allergies   Allergen Reactions     Levothyroxine      Dizzy to generic --- not to synthroid           Patient under age 50, mutual decision reflected in health maintenance.        Pertinent mammograms are reviewed under the imaging tab.  History of abnormal Pap smear:   NO - age 30- 65 PAP every 3 years recommended  Last 3 Pap Results:   PAP (no units)   Date Value   03/06/2013 NIL   12/06/2010 NIL   05/06/2009 NIL       Reviewed and updated as needed this visit by clinical staffTobacco  Allergies  Meds  Med Hx  Surg Hx  Fam Hx  Soc Hx        Reviewed and updated as needed this visit by Provider        Past Medical History:   Diagnosis Date     Anxiety      DEPRESSIVE DISORDER NEC 2/8/2003     Type 1 diabetes mellitus without complication (H) 2/17/2016     Unspecified hypothyroidism 6/2003      Past Surgical History:   Procedure Laterality Date     HC REMOVAL OF TONSILS,12+ Y/O         ROS:  C: NEGATIVE for fever, chills, change in weight  I: NEGATIVE for worrisome rashes, moles or lesions  E: NEGATIVE for vision changes or irritation  ENT: NEGATIVE for ear, mouth and throat problems  R: NEGATIVE for significant cough or SOB  B: NEGATIVE for masses, tenderness or discharge  CV: NEGATIVE for chest pain, palpitations or peripheral edema  GI: NEGATIVE for nausea, abdominal pain, heartburn, or change in bowel habits  : NEGATIVE for unusual urinary or vaginal symptoms. Periods are regular but have been really heavy within the past year.   M: NEGATIVE for significant arthralgias or myalgia  N: NEGATIVE for weakness, dizziness or paresthesias  P: NEGATIVE for changes in mood or affect     OBJECTIVE:   /86  Pulse 85  Temp 99.5  F (37.5  C) (Oral)  Wt 193 lb (87.5 kg)  LMP 09/04/2017  SpO2 100%  BMI 32.87 kg/m2  EXAM:  GENERAL: healthy, alert and no distress  EYES: Eyes grossly normal to  inspection, PERRL and conjunctivae and sclerae normal  HENT: ear canals and TM's normal, nose and mouth without ulcers or lesions  NECK: no adenopathy, no asymmetry, masses, or scars and thyroid normal to palpation  RESP: lungs clear to auscultation - no rales, rhonchi or wheezes  BREAST: normal without masses, tenderness or nipple discharge and no palpable axillary masses or adenopathy  CV: regular rate and rhythm, normal S1 S2, no S3 or S4, no murmur, click or rub, no peripheral edema and peripheral pulses strong  ABDOMEN: soft, nontender, no hepatosplenomegaly, no masses and bowel sounds normal   (female): normal female external genitalia, normal urethral meatus, vaginal mucosa pink, moist, well rugated, and normal cervix/adnexa/uterus without masses or discharge  MS: no gross musculoskeletal defects noted, no edema  SKIN: no suspicious lesions or rashes  NEURO: Normal strength and tone, mentation intact and speech normal  PSYCH: mentation appears normal, affect normal/bright    ASSESSMENT/PLAN:   1. Routine general medical examination at a health care facility  Health maintenance reviewed and updated.    2. Encounter for screening mammogram for breast cancer  - MA Screening Digital Bilateral; Future    3. Need for prophylactic vaccination and inoculation against influenza  - FLU VAC, SPLIT VIRUS IM > 3 YO (QUADRIVALENT) [03216]  - Vaccine Administration, Each Additional [98607]    4. Screening for malignant neoplasm of cervix  - Pap imaged thin layer screen with HPV - recommended age 30 - 65  - HPV High Risk Types DNA Cervical    5. Menorrhagia with regular cycle  - CBC with platelets differential    6. Type 1 diabetes mellitus without complication (H)  Stable, managed by Endocrinology    7. Need for pneumococcal vaccine  - Pneumococcal vaccine 23 valent PPSV23  (Pneumovax) [02833]    COUNSELING:  Reviewed preventive health counseling, as reflected in patient instructions       Regular exercise       Healthy  "diet/nutrition       reports that she has never smoked. She has never used smokeless tobacco.    Estimated body mass index is 32.87 kg/(m^2) as calculated from the following:    Height as of 9/18/17: 5' 4.25\" (1.632 m).    Weight as of this encounter: 193 lb (87.5 kg).   Weight management plan: Discussed healthy diet and exercise guidelines and patient will follow up in 12 months in clinic to re-evaluate.    Counseling Resources:  ATP IV Guidelines  Pooled Cohorts Equation Calculator  Breast Cancer Risk Calculator  FRAX Risk Assessment  ICSI Preventive Guidelines  Dietary Guidelines for Americans, 2010  [employer]'s MyPlate  ASA Prophylaxis  Lung CA Screening    Kristen M. Kehr, PA-C  [facility]  Injectable Influenza Immunization Documentation    1.  Is the person to be vaccinated sick today?   No    2. Does the person to be vaccinated have an allergy to a component   of the vaccine?   No    3. Has the person to be vaccinated ever had a serious reaction   to influenza vaccine in the past?   No    4. Has the person to be vaccinated ever had Guillain-Barré syndrome?   No    Form completed by Roge GARCIA MA      Screening Questionnaire for Adult Immunization    Are you sick today?   No   Do you have allergies to medications, food, a vaccine component or latex?   Yes, meds   Have you ever had a serious reaction after receiving a vaccination?   No   Do you have a long-term health problem with heart disease, lung disease, asthma, kidney disease, metabolic disease (e.g. diabetes), anemia, or other blood disorder?   Yes, diabetes and thyroid   Do you have cancer, leukemia, HIV/AIDS, or any other immune system problem?   No   In the past 3 months, have you taken medications that affect  your immune system, such as prednisone, other steroids, or anticancer drugs; drugs for the treatment of rheumatoid arthritis, Crohn s disease, or psoriasis; or have you had radiation treatments?   No   Have you had a seizure, or a brain " or other nervous system problem?   No   During the past year, have you received a transfusion of blood or blood     products, or been given immune (gamma) globulin or antiviral drug?   No   For women: Are you pregnant or is there a chance you could become        pregnant during the next month?   No   Have you received any vaccinations in the past 4 weeks?   No     Immunization questionnaire was positive for at least one answer.  Notified Kehr,Kristen PA-C.        Per orders of Kehr,Kristen PA-C injection of Pneumovax 23 given by Roge Vera. Patient instructed to remain in clinic for 15 minutes afterwards, and to report any adverse reaction to me immediately.       Screening performed by Roge Vera on 9/25/2017 at 9:01 AM.

## 2017-09-25 NOTE — PATIENT INSTRUCTIONS
Preventive Health Recommendations  Female Ages 40 to 49    Yearly exam:     See your health care provider every year in order to  1. Review health changes.   2. Discuss preventive care.    3. Review your medicines if your doctor prescribed any.      Get a Pap test every three years (unless you have an abnormal result and your provider advises testing more often).      If you get Pap tests with HPV test, you only need to test every 5 years, unless you have an abnormal result. You do not need a Pap test if your uterus was removed (hysterectomy) and you have not had cancer.      You should be tested each year for STDs (sexually transmitted diseases), if you're at risk.       Ask your doctor if you should have a mammogram.      Have a colonoscopy (test for colon cancer) if someone in your family has had colon cancer or polyps before age 50.       Have a cholesterol test every 5 years.       Have a diabetes test (fasting glucose) after age 45. If you are at risk for diabetes, you should have this test every 3 years.    Shots: Get a flu shot each year. Get a tetanus shot every 10 years.     Nutrition:     Eat at least 5 servings of fruits and vegetables each day.    Eat whole-grain bread, whole-wheat pasta and brown rice instead of white grains and rice.    Talk to your provider about Calcium and Vitamin D.     Lifestyle    Exercise at least 150 minutes a week (an average of 30 minutes a day, 5 days a week). This will help you control your weight and prevent disease.    Limit alcohol to one drink per day.    No smoking.     Wear sunscreen to prevent skin cancer.    See your dentist every six months for an exam and cleaning.      Give contact information to schedule mammogram in [facility] today after immunizations    Flu and pneumonia vaccines

## 2017-09-25 NOTE — MR AVS SNAPSHOT
After Visit Summary   9/25/2017    Brigitte Robles    MRN: [id number]           Patient Information     Date Of Birth          1975        Visit Information        Provider Department      9/25/2017 9:00 AM Kehr, Kristen M, PA-C [facility] [place]        Today's Diagnoses     Routine general medical examination at a health care facility    -  1    Encounter for screening mammogram for breast cancer        Need for prophylactic vaccination and inoculation against influenza        Screening for malignant neoplasm of cervix        Visit for screening mammogram        Excessive or frequent menstruation          Care Instructions      Preventive Health Recommendations  Female Ages 40 to 49    Yearly exam:     See your health care provider every year in order to  1. Review health changes.   2. Discuss preventive care.    3. Review your medicines if your doctor prescribed any.      Get a Pap test every three years (unless you have an abnormal result and your provider advises testing more often).      If you get Pap tests with HPV test, you only need to test every 5 years, unless you have an abnormal result. You do not need a Pap test if your uterus was removed (hysterectomy) and you have not had cancer.      You should be tested each year for STDs (sexually transmitted diseases), if you're at risk.       Ask your doctor if you should have a mammogram.      Have a colonoscopy (test for colon cancer) if someone in your family has had colon cancer or polyps before age 50.       Have a cholesterol test every 5 years.       Have a diabetes test (fasting glucose) after age 45. If you are at risk for diabetes, you should have this test every 3 years.    Shots: Get a flu shot each year. Get a tetanus shot every 10 years.     Nutrition:     Eat at least 5 servings of fruits and vegetables each day.    Eat whole-grain bread, whole-wheat pasta and brown rice instead of white grains and rice.    Talk to your  provider about Calcium and Vitamin D.     Lifestyle    Exercise at least 150 minutes a week (an average of 30 minutes a day, 5 days a week). This will help you control your weight and prevent disease.    Limit alcohol to one drink per day.    No smoking.     Wear sunscreen to prevent skin cancer.    See your dentist every six months for an exam and cleaning.      Give contact information to schedule mammogram in [facility] today after immunizations    Flu and pneumonia vaccines          Follow-ups after your visit        Your next 10 appointments already scheduled     Jan 29, 2018  4:00 PM CST   Return Visit with Lloyd Ledesma MD, MG ENDO NURSE   [facility]    [address] [place] [id number] [phone]              Future tests that were ordered for you today     Open Future Orders        Priority Expected Expires Ordered    MA Screening Digital Bilateral Routine 9/25/2017 9/25/2018 9/25/2017            Who to contact     If you have questions or need follow up information about today's clinic visit or your schedule please contact [facility] directly at [phone].  Normal or non-critical lab and imaging results will be communicated to you by [employer], letter or phone within 4 business days after the clinic has received the results. If you do not hear from us within 7 days, please contact the clinic through [employer] or phone. If you have a critical or abnormal lab result, we will notify you by phone as soon as possible.  Submit refill requests through [employer] or call your pharmacy and they will forward the refill request to us. Please allow 3 business days for your refill to be completed.          Additional Information About Your Visit        [employer] Information     [employer] gives you secure access to your electronic health record. If you see a primary care provider, you can also send messages to your care team and  make appointments. If you have questions, please call your primary care clinic.  If you do not have a primary care provider, please call [phone] and they will assist you.        Care EveryWhere ID     This is your Care EveryWhere ID. This could be used by other organizations to access your [place] medical records  [id number]        Your Vitals Were     Pulse Temperature Last Period Pulse Oximetry BMI (Body Mass Index)       85 99.5  F (37.5  C) (Oral) 09/04/2017 100% 32.87 kg/m2        Blood Pressure from Last 3 Encounters:   09/25/17 138/86   09/18/17 (!) 147/92   05/08/17 133/77    Weight from Last 3 Encounters:   09/25/17 193 lb (87.5 kg)   09/18/17 193 lb 12.6 oz (87.9 kg)   05/08/17 183 lb (83 kg)              We Performed the Following     CBC with platelets differential     FLU VAC, SPLIT VIRUS IM > 3 YO (QUADRIVALENT) [04479]     HPV High Risk Types DNA Cervical     Pap imaged thin layer screen with HPV - recommended age 30 - 65     Pneumococcal vaccine 23 valent PPSV23  (Pneumovax) [89651]     Vaccine Administration, Each Additional [77348]        Primary Care Provider Office Phone # Fax #    Kristen M Kehr, PA-C [phone] [address] [facility] [address]        Equal Access to Services     KIN EARL : Hadii aad ku hadasho Soomaali, waaxda luqadaha, qaybta kaalmada juanegyada, daniela luna . So [facility] [phone].    ATENCIÓN: Si habla español, tiene a espinosa disposición servicios gratuitos de asistencia lingüística. Analy al [phone].    We comply with applicable federal civil rights laws and [place] laws. We do not discriminate on the basis of race, color, national origin, age, disability sex, sexual orientation or gender identity.            Thank you!     Thank you for choosing [facility]  for your care. Our goal is always to provide you with excellent care. Hearing back from our patients is one way we can continue to  improve our services. Please take a few minutes to complete the written survey that you may receive in the mail after your visit with us. Thank you!             Your Updated Medication List - Protect others around you: Learn how to safely use, store and throw away your medicines at www.disposemymeds.org.          This list is accurate as of: 9/25/17  9:25 AM.  Always use your most recent med list.                   Brand Name Dispense Instructions for use Diagnosis    blood glucose monitoring test strip    ONE TOUCH VERIO IQ    300 strip    Use to test blood sugar 10 times daily or as directed.  Ok to substitute alternative if insurance prefers.    Type 1 diabetes mellitus with hyperglycemia (H)       DEXCOM G4 PLATINUM TRANSMITTER Kit     1 kit    CHANGE EVERY 6 MONTHS    Type 1 diabetes mellitus without complication (H)       DEXCOM G5 MOB/G4 PLAT SENSOR Misc     12 each    1 patch every 7 days Please replace sensor patch every seven days.    Type 1 diabetes mellitus without complication (H)       fluticasone 50 MCG/ACT spray    FLONASE    1 Package    Spray 1-2 sprays into both nostrils daily    Seasonal allergic rhinitis       glucagon 1 MG kit    GLUCAGON EMERGENCY    1 mg    Inject 1 mg into the muscle once for 1 dose    Type 1 diabetes mellitus without complication (H)       insulin aspart 100 UNIT/ML injection    NovoLOG FLEXPEN    30 mL    1 unit/10 gms of carbs with each meal, plus the scale Max usage of insulin is 50 units daily.    Type 1 diabetes mellitus with hyperglycemia (H)       insulin degludec 200 UNIT/ML pen    TRESIBA    18 mL    Inject 30 Units Subcutaneous daily    Type 1 diabetes mellitus without complication (H)       insulin pen needle 31G X 6 MM     200 each    Use 6 daily or as directed.    Type 1 diabetes mellitus with hyperglycemia (H)       ONETOUCH DELICA LANCETS 33G Misc     100 each    1 Box 6 times daily    Type 1 diabetes mellitus with hyperglycemia (H)       SYNTHROID 112 MCG  tablet   Generic drug:  levothyroxine     90 tablet    Take 1 tablet (112 mcg) by mouth daily    Hypothyroidism, unspecified type, Type 1 diabetes mellitus without complication (H)

## 2017-09-25 NOTE — NURSING NOTE
"Chief Complaint   Patient presents with     Physical       Initial /86  Pulse 85  Temp 99.5  F (37.5  C) (Oral)  Wt 193 lb (87.5 kg)  LMP 09/04/2017  SpO2 100%  BMI 32.87 kg/m2 Estimated body mass index is 32.87 kg/(m^2) as calculated from the following:    Height as of 9/18/17: 5' 4.25\" (1.632 m).    Weight as of this encounter: 193 lb (87.5 kg).  Medication Reconciliation: complete    AUDRA Vera MA    "

## 2017-09-26 ASSESSMENT — ANXIETY QUESTIONNAIRES: GAD7 TOTAL SCORE: 1

## 2017-09-27 LAB
COPATH REPORT: NORMAL
PAP: NORMAL

## 2017-09-28 LAB
FINAL DIAGNOSIS: NORMAL
HPV HR 12 DNA CVX QL NAA+PROBE: NEGATIVE
HPV16 DNA SPEC QL NAA+PROBE: NEGATIVE
HPV18 DNA SPEC QL NAA+PROBE: NEGATIVE
SPECIMEN DESCRIPTION: NORMAL

## 2017-09-29 ENCOUNTER — TELEPHONE (OUTPATIENT)
Dept: ENDOCRINOLOGY | Facility: CLINIC | Age: 42
End: 2017-09-29

## 2017-09-29 NOTE — TELEPHONE ENCOUNTER
Patient left DMV forms with . Patient did not fill out her portion of forms. Writer attempted to call patient, no answer.    Left message for patient to return call.    Zuleyma Abrams LPN  Adult Endocrinology  [facility]

## 2017-11-10 ENCOUNTER — OFFICE VISIT (OUTPATIENT)
Dept: FAMILY MEDICINE | Facility: CLINIC | Age: 42
End: 2017-11-10
Payer: COMMERCIAL

## 2017-11-10 VITALS
DIASTOLIC BLOOD PRESSURE: 88 MMHG | BODY MASS INDEX: 33.72 KG/M2 | TEMPERATURE: 98.3 F | HEART RATE: 72 BPM | SYSTOLIC BLOOD PRESSURE: 131 MMHG | WEIGHT: 198 LBS

## 2017-11-10 DIAGNOSIS — J01.90 ACUTE SINUSITIS WITH SYMPTOMS > 10 DAYS: Primary | ICD-10-CM

## 2017-11-10 DIAGNOSIS — E03.9 HYPOTHYROIDISM, UNSPECIFIED TYPE: ICD-10-CM

## 2017-11-10 DIAGNOSIS — E10.9 TYPE 1 DIABETES MELLITUS WITHOUT COMPLICATION (H): ICD-10-CM

## 2017-11-10 LAB — TSH SERPL DL<=0.005 MIU/L-ACNC: 1.06 MU/L (ref 0.4–4)

## 2017-11-10 PROCEDURE — 36415 COLL VENOUS BLD VENIPUNCTURE: CPT | Performed by: INTERNAL MEDICINE

## 2017-11-10 PROCEDURE — 99214 OFFICE O/P EST MOD 30 MIN: CPT | Performed by: PHYSICIAN ASSISTANT

## 2017-11-10 PROCEDURE — 84443 ASSAY THYROID STIM HORMONE: CPT | Performed by: INTERNAL MEDICINE

## 2017-11-10 ASSESSMENT — ENCOUNTER SYMPTOMS
MUSCULOSKELETAL NEGATIVE: 1
GASTROINTESTINAL NEGATIVE: 1
RESPIRATORY NEGATIVE: 1
NEUROLOGICAL NEGATIVE: 1
CARDIOVASCULAR NEGATIVE: 1
PSYCHIATRIC NEGATIVE: 1

## 2017-11-10 NOTE — MR AVS SNAPSHOT
After Visit Summary   11/10/2017    Brigitte Robles    MRN: [id number]           Patient Information     Date Of Birth          1975        Visit Information        Provider Department      11/10/2017 12:40 PM Iris Hermosillo PA-C [facility]        Today's Diagnoses     Acute sinusitis with symptoms > 10 days    -  1    Type 1 diabetes mellitus without complication (H)           Follow-ups after your visit        Your next 10 appointments already scheduled     Jan 29, 2018  4:00 PM CST   Return Visit with Lloyd Ledesma MD, MG ENDO NURSE   [facility]    [address] [address] [place] [address] [phone]              Who to contact     If you have questions or need follow up information about today's clinic visit or your schedule please contact [facility] directly at [phone].  Normal or non-critical lab and imaging results will be communicated to you by MyChart, letter or phone within 4 business days after the clinic has received the results. If you do not hear from us within 7 days, please contact the clinic through [employer] or phone. If you have a critical or abnormal lab result, we will notify you by phone as soon as possible.  Submit refill requests through [employer] or call your pharmacy and they will forward the refill request to us. Please allow 3 business days for your refill to be completed.          Additional Information About Your Visit        MyChart Information     [employer] gives you secure access to your electronic health record. If you see a primary care provider, you can also send messages to your care team and make appointments. If you have questions, please call your primary care clinic.  If you do not have a primary care provider, please call [phone] and they will assist you.        Care EveryWhere ID     This is your Care EveryWhere ID. This could be used by other  organizations to access your [facility] medical records  [id number]        Your Vitals Were     Pulse Temperature Breastfeeding? BMI (Body Mass Index)          72 98.3  F (36.8  C) (Oral) No 33.72 kg/m2         Blood Pressure from Last 3 Encounters:   11/10/17 131/88   09/25/17 138/86   09/18/17 (!) 147/92    Weight from Last 3 Encounters:   11/10/17 198 lb (89.8 kg)   09/25/17 193 lb (87.5 kg)   09/18/17 193 lb 12.6 oz (87.9 kg)              Today, you had the following     No orders found for display         Today's Medication Changes          These changes are accurate as of: 11/10/17  1:01 PM.  If you have any questions, ask your nurse or doctor.               Start taking these medicines.        Dose/Directions    amoxicillin-clavulanate 875-125 MG per tablet   Commonly known as:  AUGMENTIN   Used for:  Acute sinusitis with symptoms > 10 days   Started by:  Iris Hermosillo PA-C        Dose:  1 tablet   Take 1 tablet by mouth 2 times daily   Quantity:  20 tablet   Refills:  0         Stop taking these medicines if you haven't already. Please contact your care team if you have questions.     fluticasone 50 MCG/ACT spray   Commonly known as:  FLONASE   Stopped by:  Iris Hermosillo PA-C                Where to get your medicines      These medications were sent to [facility]/pharmacy #[id number] - [address],  AT [address] OF [address] [address]     Phone:  [phone]     amoxicillin-clavulanate 875-125 MG per tablet                Primary Care Provider Office Phone # Fax #    Kristen M Kehr, PA-C [phone] [address] [facility] [address]        Equal Access to Services     KIN EARL : [place] Somarya, waaxda luqadaha, qaybta kaalmada adeegyaann, daniela gallardo. [facility] [phone].    ATENCIÓN: Si habla español, tiene a espinosa disposición servicios gratuitos de  asistencia lingüística. Analy al [phone].    We comply with applicable federal civil rights laws and [place] laws. We do not discriminate on the basis of race, color, national origin, age, disability, sex, sexual orientation, or gender identity.            Thank you!     [facility] you [facility]. Our goal is always to provide you with excellent care. Hearing back from our patients is one way we can continue to improve our services. Please take a few minutes to complete the written survey that you may receive in the mail after your visit with us. Thank you!             Your Updated Medication List - Protect others around you: Learn how to safely use, store and throw away your medicines at www.disposemymeds.org.          This list is accurate as of: 11/10/17  1:01 PM.  Always use your most recent med list.                   Brand Name Dispense Instructions for use Diagnosis    amoxicillin-clavulanate 875-125 MG per tablet    AUGMENTIN    20 tablet    Take 1 tablet by mouth 2 times daily    Acute sinusitis with symptoms > 10 days       blood glucose monitoring test strip    ONETOUCH VERIO IQ    300 strip    Use to test blood sugar 10 times daily or as directed.  Ok to substitute alternative if insurance prefers.    Type 1 diabetes mellitus with hyperglycemia (H)       DEXCOM G4 PLATINUM TRANSMITTER Kit     1 kit    CHANGE EVERY 6 MONTHS    Type 1 diabetes mellitus without complication (H)       DEXCOM G5 MOB/G4 PLAT SENSOR Misc     12 each    1 patch every 7 days Please replace sensor patch every seven days.    Type 1 diabetes mellitus without complication (H)       glucagon 1 MG kit    GLUCAGON EMERGENCY    1 mg    Inject 1 mg into the muscle once for 1 dose    Type 1 diabetes mellitus without complication (H)       insulin aspart 100 UNIT/ML injection    NovoLOG FLEXPEN    30 mL    1 unit/10 gms of carbs with each meal, plus the scale Max usage of insulin is 50 units  daily.    Type 1 diabetes mellitus with hyperglycemia (H)       insulin degludec 200 UNIT/ML pen    TRESIBA    18 mL    Inject 30 Units Subcutaneous daily    Type 1 diabetes mellitus without complication (H)       insulin pen needle 31G X 6 MM     200 each    Use 6 daily or as directed.    Type 1 diabetes mellitus with hyperglycemia (H)       ONETOUCH DELICA LANCETS 33G Misc     100 each    1 Box 6 times daily    Type 1 diabetes mellitus with hyperglycemia (H)       SYNTHROID 112 MCG tablet   Generic drug:  levothyroxine     90 tablet    Take 1 tablet (112 mcg) by mouth daily    Hypothyroidism, unspecified type, Type 1 diabetes mellitus without complication (H)

## 2017-11-10 NOTE — PROGRESS NOTES
SUBJECTIVE:   Brigitte Robles is a 42 year old female who presents to clinic today for the following health issues:       SYMPTOMS      Duration: 2 weeks    Description  nasal congestion, facial pain/pressure and ear pain bilateral    Severity: moderate    Accompanying signs and symptoms: None    History (predisposing factors):  none    Precipitating or alleviating factors: None    Therapies tried and outcome:  oral decongestant    She has been taking Sudafed for two weeks, using leonard pot  No fever  Malaise  Post nasal drip  Mild sore throat  Both ears hurt, right worse than left  Crusty eyes in the morning     Blood sugar readings have been higher than normal - yesterday in the 300's     Problem list and histories reviewed & adjusted, as indicated.  Additional history: as documented    Patient Active Problem List   Diagnosis     Depressive disorder, not elsewhere classified     Hypothyroidism     GLUCOCORTICOID DEFICIENT     Anxiety     CARDIOVASCULAR SCREENING; LDL GOAL LESS THAN 160     Seasonal allergic rhinitis     Type 1 diabetes mellitus without complication (H)     Past Surgical History:   Procedure Laterality Date     HC REMOVAL OF TONSILS,13+ Y/O         Social History   Substance Use Topics     Smoking status: Never Smoker     Smokeless tobacco: Never Used     Alcohol use Yes      Comment: rare     Family History   Problem Relation Age of Onset     DIABETES Mother      Hypertension Mother      Thyroid Disease Mother      DIABETES Father      1     Thyroid Disease Father      BORIS'S     DIABETES Sister      Thyroid Disease Sister      HEART DISEASE Maternal Grandmother      TRIPLE BYPASS     Thyroid Disease Maternal Grandmother      DIABETES Maternal Grandmother      2     HEART DISEASE Maternal Grandfather      TRIPLE BYPASS     Coronary Artery Disease Maternal Grandfather      Thyroid Disease Maternal Grandfather      Thyroid Disease Brother      DIABETES Paternal Aunt      DIABETES Paternal Uncle       DIABETES Brother      1     Thyroid Disease Brother      DIABETES Nephew      1     Depression Sister      Thyroid Disease Sister      DIABETES Sister      1     Breast Cancer No family hx of      Cancer - colorectal No family hx of          Current Outpatient Prescriptions   Medication Sig Dispense Refill     insulin aspart (NOVOLOG FLEXPEN) 100 UNIT/ML injection 1 unit/10 gms of carbs with each meal, plus the scale Max usage of insulin is 50 units daily. 30 mL 3     insulin pen needle 31G X 6 MM Use 6 daily or as directed. 200 each 12     blood glucose monitoring (ONE TOUCH VERIO IQ) test strip Use to test blood sugar 10 times daily or as directed.  Ok to substitute alternative if insurance prefers. 300 strip 11     ONETOUCH DELICA LANCETS 33G MISC 1 Box 6 times daily 100 each 12     Continuous Blood Gluc Sensor (DEXCOM G5 MOB/G4 PLAT SENSOR) MISC 1 patch every 7 days Please replace sensor patch every seven days. 12 each 3     SYNTHROID 112 MCG tablet Take 1 tablet (112 mcg) by mouth daily 90 tablet 3     insulin degludec (TRESIBA) 200 UNIT/ML pen Inject 30 Units Subcutaneous daily 18 mL 3     Continuous Glucose Monitor (DEXCOM G4 PLATINUM TRANSMITTER) KIT CHANGE EVERY 6 MONTHS 1 kit 1     glucagon (GLUCAGON EMERGENCY) 1 MG injection Inject 1 mg into the muscle once for 1 dose 1 mg 11     Allergies   Allergen Reactions     Levothyroxine      Dizzy to generic --- not to synthroid         Reviewed and updated as needed this visit by clinical staff       Reviewed and updated as needed this visit by Provider         Review of Systems   Constitutional:        As in HPI   HENT:        As in HPI   Eyes:        As in HPI   Respiratory: Negative.    Cardiovascular: Negative.    Gastrointestinal: Negative.    Genitourinary: Negative.    Musculoskeletal: Negative.    Skin: Negative.    Neurological: Negative.    Psychiatric/Behavioral: Negative.          OBJECTIVE:     /88  Pulse 72  Temp 98.3  F (36.8  C)  (Oral)  Wt 198 lb (89.8 kg)  Breastfeeding? No  BMI 33.72 kg/m2  Body mass index is 33.72 kg/(m^2).  GENERAL: healthy, alert and no distress  EYES: Eyes grossly normal to inspection, PERRL and conjunctivae and sclerae normal  HENT: normal cephalic/atraumatic, right ear: clear effusion, left ear: occluded with wax, nasal mucosa edematous , oropharynx clear, oral mucous membranes moist and sinuses: not tender  NECK: no adenopathy, no asymmetry, masses, or scars and thyroid normal to palpation  RESP: lungs clear to auscultation - no rales, rhonchi or wheezes  CV: regular rate and rhythm, normal S1 S2, no murmur  MS: no gross musculoskeletal defects noted, no edema  SKIN: no suspicious lesions or rashes  NEURO: Normal strength and tone, mentation intact and speech normal    Diagnostic Test Results:  none     ASSESSMENT/PLAN:     1. Acute sinusitis with symptoms > 10 days  - amoxicillin-clavulanate (AUGMENTIN) 875-125 MG per tablet; Take 1 tablet by mouth 2 times daily  Dispense: 20 tablet; Refill: 0  Mucinex and Usha pot   Stop taking the Sudafed    2. Type 1 diabetes mellitus without complication (H)  Follow up with PCP if blood sugars do not normalized within the next week    Iris Hermosillo PA-C  [facility]

## 2017-11-10 NOTE — NURSING NOTE
"Chief Complaint   Patient presents with     Otalgia     c/o ear pain and congestion x 2 weeks       Initial /88  Pulse 72  Temp 98.3  F (36.8  C) (Oral)  Wt 198 lb (89.8 kg)  Breastfeeding? No  BMI 33.72 kg/m2 Estimated body mass index is 33.72 kg/(m^2) as calculated from the following:    Height as of 9/18/17: 5' 4.25\" (1.632 m).    Weight as of this encounter: 198 lb (89.8 kg).  Medication Reconciliation: complete   Shai Oliva MA      "

## 2017-12-07 DIAGNOSIS — E10.65 TYPE 1 DIABETES MELLITUS WITH HYPERGLYCEMIA (H): ICD-10-CM

## 2017-12-13 ENCOUNTER — E-VISIT (OUTPATIENT)
Dept: FAMILY MEDICINE | Facility: CLINIC | Age: 42
End: 2017-12-13
Payer: COMMERCIAL

## 2017-12-13 DIAGNOSIS — B96.89 BACTERIAL VAGINITIS: Primary | ICD-10-CM

## 2017-12-13 DIAGNOSIS — N76.0 BACTERIAL VAGINITIS: Primary | ICD-10-CM

## 2017-12-13 PROCEDURE — 99444 ZZC PHYSICIAN ONLINE EVALUATION & MANAGEMENT SERVICE: CPT | Performed by: PHYSICIAN ASSISTANT

## 2017-12-14 RX ORDER — METRONIDAZOLE 500 MG/1
500 TABLET ORAL 2 TIMES DAILY
Qty: 14 TABLET | Refills: 0 | Status: SHIPPED | OUTPATIENT
Start: 2017-12-14 | End: 2018-01-29

## 2018-01-06 DIAGNOSIS — E10.65 TYPE 1 DIABETES MELLITUS WITH HYPERGLYCEMIA (H): ICD-10-CM

## 2018-01-08 RX ORDER — FLURBIPROFEN SODIUM 0.3 MG/ML
SOLUTION/ DROPS OPHTHALMIC
Qty: 200 EACH | Refills: 1 | Status: SHIPPED | OUTPATIENT
Start: 2018-01-08 | End: 2019-02-04

## 2018-01-29 ENCOUNTER — OFFICE VISIT (OUTPATIENT)
Dept: ENDOCRINOLOGY | Facility: CLINIC | Age: 43
End: 2018-01-29
Payer: COMMERCIAL

## 2018-01-29 VITALS
DIASTOLIC BLOOD PRESSURE: 87 MMHG | WEIGHT: 195.44 LBS | HEART RATE: 74 BPM | BODY MASS INDEX: 32.56 KG/M2 | SYSTOLIC BLOOD PRESSURE: 129 MMHG | HEIGHT: 65 IN | OXYGEN SATURATION: 98 % | TEMPERATURE: 98.3 F

## 2018-01-29 DIAGNOSIS — E10.9 TYPE 1 DIABETES MELLITUS WITHOUT COMPLICATION (H): Primary | ICD-10-CM

## 2018-01-29 DIAGNOSIS — E03.9 HYPOTHYROIDISM, UNSPECIFIED TYPE: ICD-10-CM

## 2018-01-29 LAB — HBA1C MFR BLD: 7.4 % (ref 0–5.7)

## 2018-01-29 PROCEDURE — 36415 COLL VENOUS BLD VENIPUNCTURE: CPT | Performed by: INTERNAL MEDICINE

## 2018-01-29 PROCEDURE — 83036 HEMOGLOBIN GLYCOSYLATED A1C: CPT | Performed by: INTERNAL MEDICINE

## 2018-01-29 PROCEDURE — 99214 OFFICE O/P EST MOD 30 MIN: CPT | Performed by: INTERNAL MEDICINE

## 2018-01-29 RX ORDER — LEVOTHYROXINE SODIUM 112 MCG
112 TABLET ORAL DAILY
Qty: 96 TABLET | Refills: 1 | Status: SHIPPED | OUTPATIENT
Start: 2018-01-29 | End: 2018-07-11

## 2018-01-29 NOTE — PATIENT INSTRUCTIONS
Sending blood sugars to your provider at [facility]:  We want to help you with your diabetes management, which often requires frequent adjustments to your therapy. For your convenience, we have several ways to send your blood sugars to your doctor for review.    - Send message directly to your doctor through My Chart.  Please ask the rooming staff if you would like to sign up for My Chart.  This is a fast and confidential way to send your information and communicate directly with your provider.   - Record readings and fax to [phone].  We have a template for you to use for your convenience.  - Stop by the clinic with your meter for download if advised by provider.   - My Chart or call Fatou Arora, Diabetes Educator at [phone]  - Call the clinic and speak to one of the endocrine nurses to relay information on the telephone.  Cora Castanon, or Zuleyma at [phone].   -    Please call the on-call Endocrinologist at the [facility] for after       hours/weekend needs at [phone] Option #4.    Please note that you do not need to FAST if you are just having an A1C drawn. Please remember to ALWAYS bring your glucose meter with to your appointment. This data is very important for the management of your care.    Thank you!  Your [facility] Care Team      Please bring Dexom for clinic download (any day but Thursday)

## 2018-01-29 NOTE — MR AVS SNAPSHOT
After Visit Summary   1/29/2018    Brigitte Robles    MRN: [id number]           Patient Information     Date Of Birth          1975        Visit Information        Provider Department      1/29/2018 11:25 AM Lloyd Ledesma MD; MG [facility]        Today's Diagnoses     Type 1 diabetes mellitus without complication (H)    -  1    Hypothyroidism, unspecified type          Care Instructions      Sending blood sugars to your provider at [facility]:  We want to help you with your diabetes management, which often requires frequent adjustments to your therapy. For your convenience, we have several ways to send your blood sugars to your doctor for review.    - Send message directly to your doctor through My Chart.  Please ask the rooming staff if you would like to sign up for My Chart.  This is a fast and confidential way to send your information and communicate directly with your provider.   - Record readings and fax to [phone].  We have a template for you to use for your convenience.  - Stop by the clinic with your meter for download if advised by provider.   - My Chart or call Fatou Arora, Diabetes Educator at [phone]  - Call the clinic and speak to one of the endocrine nurses to relay information on the telephone.  Cora Castanon, or Zuleyma at [phone].   -    Please call the on-call Endocrinologist at the [facility] for after       hours/weekend needs at [phone] Option #4.    Please note that you do not need to FAST if you are just having an A1C drawn. Please remember to ALWAYS bring your glucose meter with to your appointment. This data is very important for the management of your care.    Thank you!  Your [facility] Care Team      Please bring Dexom for clinic download (any day but Thursday)          Follow-ups after your visit        Follow-up notes from your care team     Return in about 4 months (around 5/29/2018).      Your  next 10 appointments already scheduled     May 21, 2018  3:45 PM CDT   Return Visit with Lloyd Ledesma MD, MG ENDO NURSE   [facility] ([facility])    [address] [address] [place] [id number] [phone]              Future tests that were ordered for you today     Open Standing Orders        Priority Remaining Interval Expires Ordered    Hemoglobin A1c POCT Routine 3/4 Q 3 MO 1/29/2019 1/29/2018            Who to contact     If you have questions or need follow up information about today's clinic visit or your schedule please contact [facility] directly at [phone].  Normal or non-critical lab and imaging results will be communicated to you by Ezoichart, letter or phone within 4 business days after the clinic has received the results. If you do not hear from us within 7 days, please contact the clinic through Ezoichart or phone. If you have a critical or abnormal lab result, we will notify you by phone as soon as possible.  Submit refill requests through F?rsat Bu F?rsat or call your pharmacy and they will forward the refill request to us. Please allow 3 business days for your refill to be completed.          Additional Information About Your Visit        Ezoichart Information     F?rsat Bu F?rsat gives you secure access to your electronic health record. If you see a primary care provider, you can also send messages to your care team and make appointments. If you have questions, please call your primary care clinic.  If you do not have a primary care provider, please call [phone] and they will assist you.      F?rsat Bu F?rsat is an electronic gateway that provides easy, online access to your medical records. With F?rsat Bu F?rsat, you can request a clinic appointment, read your test results, renew a prescription or communicate with your care team.     To access your existing account, please contact your [facility] or call [phone] for  "assistance.        Care EveryWhere ID     This is your Care EveryWhere ID. This could be used by other organizations to access your [facility] medical records  [id number]        Your Vitals Were     Pulse Temperature Height Pulse Oximetry BMI (Body Mass Index)       74 98.3  F (36.8  C) 1.651 m (5' 5\") 98% 32.52 kg/m2        Blood Pressure from Last 3 Encounters:   01/29/18 129/87   11/10/17 131/88   09/25/17 138/86    Weight from Last 3 Encounters:   01/29/18 88.6 kg (195 lb 7 oz)   11/10/17 89.8 kg (198 lb)   09/25/17 87.5 kg (193 lb)              We Performed the Following     Hemoglobin A1c POCT          Today's Medication Changes          These changes are accurate as of 1/29/18 12:12 PM.  If you have any questions, ask your nurse or doctor.               These medicines have changed or have updated prescriptions.        Dose/Directions    insulin degludec 200 UNIT/ML pen   Commonly known as:  TRESIBA   This may have changed:    - how much to take  - additional instructions   Used for:  Type 1 diabetes mellitus without complication (H)        Dose:  30 Units   Inject 30 Units Subcutaneous daily   Quantity:  18 mL   Refills:  3       SYNTHROID 112 MCG tablet   This may have changed:  additional instructions   Used for:  Hypothyroidism, unspecified type, Type 1 diabetes mellitus without complication (H)   Generic drug:  levothyroxine        Dose:  112 mcg   Take 1 tablet (112 mcg) by mouth daily   Quantity:  90 tablet   Refills:  3                Primary Care Provider Office Phone # Fax #    Kristen M Kehr, PA-C [phone] [address] [address] [facility] [address]        Equal Access to Services     [facility] French CenterYESSI : Hadii yulia west Somarya, waaxda luqadaha, qaybta kaalmada nash, daniela gallardo. So [facility] [phone].    ATENCIÓN: Si habla español, tiene a espinosa disposición servicios gratuitos de asistencia lingüística. Llame al [phone].    We comply with " applicable federal civil rights laws and [place] laws. We do not discriminate on the basis of race, color, national origin, age, disability, sex, sexual orientation, or gender identity.            Thank you!     Thank you for choosing [facility]  for your care. Our goal is always to provide you with excellent care. Hearing back from our patients is one way we can continue to improve our services. Please take a few minutes to complete the written survey that you may receive in the mail after your visit with us. Thank you!             Your Updated Medication List - Protect others around you: Learn how to safely use, store and throw away your medicines at www.disposemymeds.org.          This list is accurate as of 1/29/18 12:12 PM.  Always use your most recent med list.                   Brand Name Dispense Instructions for use Diagnosis    blood glucose monitoring test strip    ONETOUCH VERIO IQ    900 strip    Use to test blood sugar 10 times daily or as directed.    Type 1 diabetes mellitus with hyperglycemia (H)       DEXCOM G4 PLATINUM TRANSMITTER Kit     1 kit    CHANGE EVERY 6 MONTHS    Type 1 diabetes mellitus without complication (H)       DEXCOM G5 MOB/G4 PLAT SENSOR Misc     12 each    1 patch every 7 days Please replace sensor patch every seven days.    Type 1 diabetes mellitus without complication (H)       glucagon 1 MG kit    GLUCAGON EMERGENCY    1 mg    Inject 1 mg into the muscle once for 1 dose    Type 1 diabetes mellitus without complication (H)       insulin aspart 100 UNIT/ML injection    NovoLOG FLEXPEN    30 mL    1 unit/10 gms of carbs with each meal, plus the scale Max usage of insulin is 50 units daily.    Type 1 diabetes mellitus with hyperglycemia (H)       insulin degludec 200 UNIT/ML pen    TRESIBA    18 mL    Inject 30 Units Subcutaneous daily    Type 1 diabetes mellitus without complication (H)       * insulin pen needle 31G X 6 MM     200 each    Use 6 daily or as  directed.    Type 1 diabetes mellitus with hyperglycemia (H)       * B-D U/F 31G X 5 MM   Generic drug:  insulin pen needle     200 each    USE 6 NEEDLES DAILY FOR INJECTIONS.    Type 1 diabetes mellitus with hyperglycemia (H)       ONETOUCH DELICA LANCETS 33G Misc     100 each    1 Box 6 times daily    Type 1 diabetes mellitus with hyperglycemia (H)       SYNTHROID 112 MCG tablet   Generic drug:  levothyroxine     90 tablet    Take 1 tablet (112 mcg) by mouth daily    Hypothyroidism, unspecified type, Type 1 diabetes mellitus without complication (H)       * Notice:  This list has 2 medication(s) that are the same as other medications prescribed for you. Read the directions carefully, and ask your doctor or other care provider to review them with you.

## 2018-01-29 NOTE — PROGRESS NOTES
Endocrinology Note         Brigitte is a 42 year old female presents today for type 1 diabetes.    HPI  Ms Brigitte Robles is a 42 years old female who is here for type 1 diabetes    She was noted to have increased blurred vision and fatigue in February 2016 and noted to have blood glucose of 300s and A1C of 7.0%. She was initially diagnosed with type 2 diabetes and was started on metformin 500 mg bid which did not control her blood glucose. She was then seen by her PCP who ran more blood test and noted for low c-peptide and positive MONTSERRAT ab and IA2 antibody. She was then started on insulin since.    Interval history:  She has been doing ok. She has been trying to lose weight with 1200 Raul per day but could not see any weight loss. She limited carb intake to be about 40 gram per meal. She does not snack. She is planning to do vegetarian meal. She exercises about 4-5 times week.     1) type 1 diabetes: she is currently on insulin degludec (Tresiba) 34 units at night, Novolog 1 unit per 10 gram and correction factor of 1 unit per 50 above 150 mg/dl. During menstrual cycle, she increased Tresiba to 36 units daily. A1C today is 7.4%.  Reviewed glucose log, average  glucose 189 (SD 83). According to Dexcom, she noticed normal BG 2 hours after dinner and then it shantal to 200-300 before bedtime.     She is still considering to be back on insulin pump but is afraid of it due to past experience.. She will think about it about it and contact CDE josephine. She did try insulin pump (Medtronic 630G) in 2017 but did not like it because her glucose seemed to be more fluctuated.    2) hypothyroidism: She has history of hypothyroidism for the past 11 years. It was diagnosed about 6 months postpartum. She is currently on Synthroid 112 mcg x6 days per week and 224 mcg x1 day per week. TSH in Nov 2017 was 1.06.    Past Medical History  Type 1 diabetes  Hypothyroidism    Allergies  Allergies   Allergen Reactions     Levothyroxine      Dizzy  to generic --- not to synthroid     Medications  Current Outpatient Prescriptions   Medication Sig Dispense Refill     B-D U/F insulin pen needle USE 6 NEEDLES DAILY FOR INJECTIONS. 200 each 1     metroNIDAZOLE (FLAGYL) 500 MG tablet Take 1 tablet (500 mg) by mouth 2 times daily 14 tablet 0     blood glucose monitoring (ONETOUCH VERIO IQ) test strip Use to test blood sugar 10 times daily or as directed. 900 strip 3     amoxicillin-clavulanate (AUGMENTIN) 875-125 MG per tablet Take 1 tablet by mouth 2 times daily 20 tablet 0     insulin aspart (NOVOLOG FLEXPEN) 100 UNIT/ML injection 1 unit/10 gms of carbs with each meal, plus the scale Max usage of insulin is 50 units daily. 30 mL 3     insulin pen needle 31G X 6 MM Use 6 daily or as directed. 200 each 12     ONETOUCH DELICA LANCETS 33G MISC 1 Box 6 times daily 100 each 12     Continuous Blood Gluc Sensor (DEXCOM G5 MOB/G4 PLAT SENSOR) MISC 1 patch every 7 days Please replace sensor patch every seven days. 12 each 3     SYNTHROID 112 MCG tablet Take 1 tablet (112 mcg) by mouth daily 90 tablet 3     insulin degludec (TRESIBA) 200 UNIT/ML pen Inject 30 Units Subcutaneous daily 18 mL 3     Continuous Glucose Monitor (DEXCOM G4 PLATINUM TRANSMITTER) KIT CHANGE EVERY 6 MONTHS 1 kit 1     glucagon (GLUCAGON EMERGENCY) 1 MG injection Inject 1 mg into the muscle once for 1 dose 1 mg 11     Family History  Type 1 diabetes in her father, mother, paternal aunt, paternal uncle, brother and sister  Type 2 diabetes in her grandmother  Heart in her maternal grandfather and maternal grandmother  hypothyroid in her brother, father, maternal grandfather, maternal grandmother, mother, and sister.     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    ROS  Constitutional: feeling good so far, working on weight loss but has difficult time losing weight  Eyes: no vision change, diplopia, no more blurred  "vision  Cardiovascular: no chest pain, palpitations  Respiratory: no dyspnea, cough, shortness of breath  GI: no nausea, vomiting, diarrhea or constipation, no abdominal pain   : no change in urine, no dysuria   Musculoskeletal: no joint swelling   Integumentary: occasional hive  Neuro: no numbness or tingling, no tremor, no headache   Endo: no polyuria or polydipsia, no temperature intolerance   Psych: she denied snoring, sleep well    Physical Exam  /87  Pulse 74  Temp 98.3  F (36.8  C)  Ht 1.651 m (5' 5\")  Wt 88.6 kg (195 lb 7 oz)  SpO2 98%  BMI 32.52 kg/m2  Body mass index is 32.52 kg/(m^2).  Constitutional: no distress, comfortable, pleasant   Eyes: anicteric  Musculoskeletal: no edema   Skin: no jaundice   Neurological: normal gait, no tremor  Psychological: appropriate mood     RESULTS  Lab Results   Component Value Date    A1C 7.4 01/29/2018    A1C 7.4 09/18/2017    A1C 7.2 09/18/2017    A1C 7.2 05/08/2017    A1C 7.1 01/23/2017      Ref. Range 2/5/2016 11:41   Glutamic Acid Decarboxylase Antibody Unknown 45.7 (H)   IA-2 Antibody Unknown 3.4 (H)   Islet Cell Antibody IgG Unknown <1:4...      Ref. Range 2/10/2016 08:35   C-Peptide Latest Ref Range: 0.9-6.9 ng/mL 0.7 (L)     ENDO DIABETES Latest Ref Rng & Units 9/18/2017   CHOLESTEROL <200 mg/dL 159   LDL CHOLESTEROL, CALCULATED <100 mg/dL 63   HDL CHOLESTEROL >49 mg/dL 77   NON HDL CHOLESTEROL <130 mg/dL 82   TRIGLYCERIDES <150 mg/dL 93     ENDO DIABETES Latest Ref Rng & Units 9/18/2017   CREATININE 0.52 - 1.04 mg/dL 0.74     ENDO DIABETES Latest Ref Rng & Units 9/18/2017   ALBUMIN URINE MG/L mg/L <5   ALBUMIN URINE MG/G CR 0 - 25 mg/g Cr Unable to calculate due to low value         ASSESSMENT:    Ms Brigitte Robles is a 42 years old female who is here for follow up type 1 diabetes    1) type 1 diabetes: diagnosed type 1 diabetes in Feb 2016. A1C is good at 7.4% but has had high fluctuation of BG  - will try minimize Tresiba to 32 units daily or "   - increase Novolog to 1 unit per 8 gram CHO due to hyperglycemia before bedtime  - continue with Novolog 1 unit per 10 gram at BF and lunchand correction factor of 1 unit per 50 above 150 mg/dl.  - she will continue to check blood glucose 3-4 times per day   - she will reconsider insulin pump again and will contact CDE.    2) Hypothyroidism: clinically and biochemically euthyroid. Continue with Synthroid 112 mcg x6 days per week and 224 mcg x1 day per week. TSH in Nov 2017 was 1.06.    PLAN:   - trial to increase Novolog to 1 unit per 8 gram CHO for dinner or reduce insulin degludec (Tresiba) to 32 units daily   - continue Novolog 1 unit per 10 gram at BF and lunch and correction factor 1 unit per 50 above 150 mg/dl  - encourage her to reconsider insulin pump  - download Dexcom or glucose meter next 2 weeks  - RTC 4 months    Lloyd Ledesma MD  Endocrinology  [phone]

## 2018-01-29 NOTE — LETTER
1/29/2018         RE: Brigitte Roblse  [address] [facility] [address]        Dear Colleague,    Thank you for referring your patient, Brigitte Robles, to the [facility]. Please see a copy of my visit note below.         Endocrinology Note         Brigitte is a 42 year old female presents today for type 1 diabetes.    HPI  Ms Brigitte Robles is a 42 years old female who is here for type 1 diabetes    She was noted to have increased blurred vision and fatigue in February 2016 and noted to have blood glucose of 300s and A1C of 7.0%. She was initially diagnosed with type 2 diabetes and was started on metformin 500 mg bid which did not control her blood glucose. She was then seen by her PCP who ran more blood test and noted for low c-peptide and positive MONTSERRAT ab and IA2 antibody. She was then started on insulin since.    Interval history:  She has been doing ok. She has been trying to lose weight with 1200 Raul per day but could not see any weight loss. She limited carb intake to be about 40 gram per meal. She does not snack. She is planning to do vegetarian meal. She exercises about 4-5 times week.     1) type 1 diabetes: she is currently on insulin degludec (Tresiba) 34 units at night, Novolog 1 unit per 10 gram and correction factor of 1 unit per 50 above 150 mg/dl. During menstrual cycle, she increased Tresiba to 36 units daily. A1C today is 7.4%.  Reviewed glucose log, average  glucose 189 (SD 83). According to Dexcom, she noticed normal BG 2 hours after dinner and then it shantal to 200-300 before bedtime.     She is still considering to be back on insulin pump but is afraid of it due to past experience.. She will think about it about it and contact CDE josephine. She did try insulin pump (Medtronic 630G) in 2017 but did not like it because her glucose seemed to be more fluctuated.    2) hypothyroidism: She has history of hypothyroidism for the past 11 years. It was diagnosed about 6 months  postpartum. She is currently on Synthroid 112 mcg x6 days per week and 224 mcg x1 day per week. TSH in Nov 2017 was 1.06.    Past Medical History  Type 1 diabetes  Hypothyroidism    Allergies  Allergies   Allergen Reactions     Levothyroxine      Dizzy to generic --- not to synthroid     Medications  Current Outpatient Prescriptions   Medication Sig Dispense Refill     B-D U/F insulin pen needle USE 6 NEEDLES DAILY FOR INJECTIONS. 200 each 1     metroNIDAZOLE (FLAGYL) 500 MG tablet Take 1 tablet (500 mg) by mouth 2 times daily 14 tablet 0     blood glucose monitoring (ONETOUCH VERIO IQ) test strip Use to test blood sugar 10 times daily or as directed. 900 strip 3     amoxicillin-clavulanate (AUGMENTIN) 875-125 MG per tablet Take 1 tablet by mouth 2 times daily 20 tablet 0     insulin aspart (NOVOLOG FLEXPEN) 100 UNIT/ML injection 1 unit/10 gms of carbs with each meal, plus the scale Max usage of insulin is 50 units daily. 30 mL 3     insulin pen needle 31G X 6 MM Use 6 daily or as directed. 200 each 12     ONETOUCH DELICA LANCETS 33G MISC 1 Box 6 times daily 100 each 12     Continuous Blood Gluc Sensor (DEXCOM G5 MOB/G4 PLAT SENSOR) MISC 1 patch every 7 days Please replace sensor patch every seven days. 12 each 3     SYNTHROID 112 MCG tablet Take 1 tablet (112 mcg) by mouth daily 90 tablet 3     insulin degludec (TRESIBA) 200 UNIT/ML pen Inject 30 Units Subcutaneous daily 18 mL 3     Continuous Glucose Monitor (DEXCOM G4 PLATINUM TRANSMITTER) KIT CHANGE EVERY 6 MONTHS 1 kit 1     glucagon (GLUCAGON EMERGENCY) 1 MG injection Inject 1 mg into the muscle once for 1 dose 1 mg 11     Family History  Type 1 diabetes in her father, mother, paternal aunt, paternal uncle, brother and sister  Type 2 diabetes in her grandmother  Heart in her maternal grandfather and maternal grandmother  hypothyroid in her brother, father, maternal grandfather, maternal grandmother, mother, and sister.     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    ROS  Constitutional: feeling good so far, working on weight loss but has difficult time losing weight  Eyes: no vision change, diplopia, no more blurred vision  Cardiovascular: no chest pain, palpitations  Respiratory: no dyspnea, cough, shortness of breath  GI: no nausea, vomiting, diarrhea or constipation, no abdominal pain   : no change in urine, no dysuria   Musculoskeletal: no joint swelling   Integumentary: occasional hive  Neuro: no numbness or tingling, no tremor, no headache   Endo: no polyuria or polydipsia, no temperature intolerance   Psych: she denied snoring, sleep well    Physical Exam  /87  Pulse 74  Temp 98.3  F (36.8  C)  Ht 1.651 m (5' 5\")  Wt 88.6 kg (195 lb 7 oz)  SpO2 98%  BMI 32.52 kg/m2  Body mass index is 32.52 kg/(m^2).  Constitutional: no distress, comfortable, pleasant   Eyes: anicteric  Musculoskeletal: no edema   Skin: no jaundice   Neurological: normal gait, no tremor  Psychological: appropriate mood     RESULTS  Lab Results   Component Value Date    A1C 7.4 01/29/2018    A1C 7.4 09/18/2017    A1C 7.2 09/18/2017    A1C 7.2 05/08/2017    A1C 7.1 01/23/2017      Ref. Range 2/5/2016 11:41   Glutamic Acid Decarboxylase Antibody Unknown 45.7 (H)   IA-2 Antibody Unknown 3.4 (H)   Islet Cell Antibody IgG Unknown <1:4...      Ref. Range 2/10/2016 08:35   C-Peptide Latest Ref Range: 0.9-6.9 ng/mL 0.7 (L)     ENDO DIABETES Latest Ref Rng & Units 9/18/2017   CHOLESTEROL <200 mg/dL 159   LDL CHOLESTEROL, CALCULATED <100 mg/dL 63   HDL CHOLESTEROL >49 mg/dL 77   NON HDL CHOLESTEROL <130 mg/dL 82   TRIGLYCERIDES <150 mg/dL 93     ENDO DIABETES Latest Ref Rng & Units 9/18/2017   CREATININE 0.52 - 1.04 mg/dL 0.74     ENDO DIABETES Latest Ref Rng & Units 9/18/2017   ALBUMIN URINE MG/L mg/L <5   ALBUMIN URINE MG/G CR 0 - 25 mg/g Cr Unable to calculate due to low value         ASSESSMENT:    Ms Briggs " Travis is a 42 years old female who is here for follow up type 1 diabetes    1) type 1 diabetes: diagnosed type 1 diabetes in Feb 2016. A1C is good at 7.4% but has had high fluctuation of BG  - will try minimize Tresiba to 32 units daily or   - increase Novolog to 1 unit per 8 gram CHO due to hyperglycemia before bedtime  - continue with Novolog 1 unit per 10 gram at BF and lunchand correction factor of 1 unit per 50 above 150 mg/dl.  - she will continue to check blood glucose 3-4 times per day   - she will reconsider insulin pump again and will contact CDE.    2) Hypothyroidism: clinically and biochemically euthyroid. Continue with Synthroid 112 mcg x6 days per week and 224 mcg x1 day per week. TSH in Nov 2017 was 1.06.    PLAN:   - trial to increase Novolog to 1 unit per 8 gram CHO for dinner or reduce insulin degludec (Tresiba) to 32 units daily   - continue Novolog 1 unit per 10 gram at BF and lunch and correction factor 1 unit per 50 above 150 mg/dl  - encourage her to reconsider insulin pump  - download Dexcom or glucose meter next 2 weeks  - RTC 4 months    Lloyd Ledesma MD  Endocrinology  [phone]

## 2018-01-29 NOTE — LETTER
1/29/2018       RE: Brigitte Robles  [address] [facility] [address]     Dear Colleague,    Thank you for referring your patient, Brigitte Robles, to the [facility] at [facility]. Please see a copy of my visit note below.         Endocrinology Note         Brigitte is a 42 year old female presents today for type 1 diabetes.    HPI  Ms Brigitte Robles is a 42 years old female who is here for type 1 diabetes    She was noted to have increased blurred vision and fatigue in February 2016 and noted to have blood glucose of 300s and A1C of 7.0%. She was initially diagnosed with type 2 diabetes and was started on metformin 500 mg bid which did not control her blood glucose. She was then seen by her PCP who ran more blood test and noted for low c-peptide and positive MONTSERRAT ab and IA2 antibody. She was then started on insulin since.    Interval history:  She has been doing ok. She has been trying to lose weight with 1200 Raul per day but could not see any weight loss. She limited carb intake to be about 40 gram per meal. She does not snack. She is planning to do vegetarian meal. She exercises about 4-5 times week.     1) type 1 diabetes: she is currently on insulin degludec (Tresiba) 34 units at night, Novolog 1 unit per 10 gram and correction factor of 1 unit per 50 above 150 mg/dl. During menstrual cycle, she increased Tresiba to 36 units daily. A1C today is 7.4%.  Reviewed glucose log, average  glucose 189 (SD 83). According to Dexcom, she noticed normal BG 2 hours after dinner and then it shantal to 200-300 before bedtime.     She is still considering to be back on insulin pump but is afraid of it due to past experience.. She will think about it about it and contact KIRAN burton. She did try insulin pump (Medtronic 630G) in 2017 but did not like it because her glucose seemed to be more fluctuated.    2) hypothyroidism: She has history of hypothyroidism for the past 11  years. It was diagnosed about 6 months postpartum. She is currently on Synthroid 112 mcg x6 days per week and 224 mcg x1 day per week. TSH in Nov 2017 was 1.06.    Past Medical History  Type 1 diabetes  Hypothyroidism    Allergies  Allergies   Allergen Reactions     Levothyroxine      Dizzy to generic --- not to synthroid     Medications  Current Outpatient Prescriptions   Medication Sig Dispense Refill     B-D U/F insulin pen needle USE 6 NEEDLES DAILY FOR INJECTIONS. 200 each 1     metroNIDAZOLE (FLAGYL) 500 MG tablet Take 1 tablet (500 mg) by mouth 2 times daily 14 tablet 0     blood glucose monitoring (ONETOUCH VERIO IQ) test strip Use to test blood sugar 10 times daily or as directed. 900 strip 3     amoxicillin-clavulanate (AUGMENTIN) 875-125 MG per tablet Take 1 tablet by mouth 2 times daily 20 tablet 0     insulin aspart (NOVOLOG FLEXPEN) 100 UNIT/ML injection 1 unit/10 gms of carbs with each meal, plus the scale Max usage of insulin is 50 units daily. 30 mL 3     insulin pen needle 31G X 6 MM Use 6 daily or as directed. 200 each 12     ONETOUCH DELICA LANCETS 33G MISC 1 Box 6 times daily 100 each 12     Continuous Blood Gluc Sensor (DEXCOM G5 MOB/G4 PLAT SENSOR) MISC 1 patch every 7 days Please replace sensor patch every seven days. 12 each 3     SYNTHROID 112 MCG tablet Take 1 tablet (112 mcg) by mouth daily 90 tablet 3     insulin degludec (TRESIBA) 200 UNIT/ML pen Inject 30 Units Subcutaneous daily 18 mL 3     Continuous Glucose Monitor (DEXCOM G4 PLATINUM TRANSMITTER) KIT CHANGE EVERY 6 MONTHS 1 kit 1     glucagon (GLUCAGON EMERGENCY) 1 MG injection Inject 1 mg into the muscle once for 1 dose 1 mg 11     Family History  Type 1 diabetes in her father, mother, paternal aunt, paternal uncle, brother and sister  Type 2 diabetes in her grandmother  Heart in her maternal grandfather and maternal grandmother  hypothyroid in her brother, father, maternal grandfather, maternal grandmother, mother, and sister.  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    ROS  Constitutional: feeling good so far, working on weight loss but has difficult time losing weight  Eyes: no vision change, diplopia, no more blurred vision  Cardiovascular: no chest pain, palpitations  Respiratory: no dyspnea, cough, shortness of breath  GI: no nausea, vomiting, diarrhea or constipation, no abdominal pain   : no change in urine, no dysuria   Musculoskeletal: no joint swelling   Integumentary: occasional hive  Neuro: no numbness or tingling, no tremor, no headache   Endo: no polyuria or polydipsia, no temperature intolerance   Psych: she denied snoring, sleep well    Physical Exam  /87  Pulse 74  Temp 98.3  F (36.8  C)  Ht 1.651 m (5' 5\")  Wt 88.6 kg (195 lb 7 oz)  SpO2 98%  BMI 32.52 kg/m2  Body mass index is 32.52 kg/(m^2).  Constitutional: no distress, comfortable, pleasant   Eyes: anicteric  Musculoskeletal: no edema   Skin: no jaundice   Neurological: normal gait, no tremor  Psychological: appropriate mood     RESULTS  Lab Results   Component Value Date    A1C 7.4 01/29/2018    A1C 7.4 09/18/2017    A1C 7.2 09/18/2017    A1C 7.2 05/08/2017    A1C 7.1 01/23/2017      Ref. Range 2/5/2016 11:41   Glutamic Acid Decarboxylase Antibody Unknown 45.7 (H)   IA-2 Antibody Unknown 3.4 (H)   Islet Cell Antibody IgG Unknown <1:4...      Ref. Range 2/10/2016 08:35   C-Peptide Latest Ref Range: 0.9-6.9 ng/mL 0.7 (L)     ENDO DIABETES Latest Ref Rng & Units 9/18/2017   CHOLESTEROL <200 mg/dL 159   LDL CHOLESTEROL, CALCULATED <100 mg/dL 63   HDL CHOLESTEROL >49 mg/dL 77   NON HDL CHOLESTEROL <130 mg/dL 82   TRIGLYCERIDES <150 mg/dL 93     ENDO DIABETES Latest Ref Rng & Units 9/18/2017   CREATININE 0.52 - 1.04 mg/dL 0.74     ENDO DIABETES Latest Ref Rng & Units 9/18/2017   ALBUMIN URINE MG/L mg/L <5   ALBUMIN URINE MG/G CR 0 - 25 mg/g Cr Unable to calculate due to low value "         ASSESSMENT:    Ms Brigitte Robles is a 42 years old female who is here for follow up type 1 diabetes    1) type 1 diabetes: diagnosed type 1 diabetes in Feb 2016. A1C is good at 7.4% but has had high fluctuation of BG  - will try minimize Tresiba to 32 units daily or   - increase Novolog to 1 unit per 8 gram CHO due to hyperglycemia before bedtime  - continue with Novolog 1 unit per 10 gram at BF and lunchand correction factor of 1 unit per 50 above 150 mg/dl.  - she will continue to check blood glucose 3-4 times per day   - she will reconsider insulin pump again and will contact CDE.    2) Hypothyroidism: clinically and biochemically euthyroid. Continue with Synthroid 112 mcg x6 days per week and 224 mcg x1 day per week. TSH in Nov 2017 was 1.06.    PLAN:   - trial to increase Novolog to 1 unit per 8 gram CHO for dinner or reduce insulin degludec (Tresiba) to 32 units daily   - continue Novolog 1 unit per 10 gram at BF and lunch and correction factor 1 unit per 50 above 150 mg/dl  - encourage her to reconsider insulin pump  - download Dexcom or glucose meter next 2 weeks  - RTC 4 months    Lloyd Ledesma MD  Endocrinology  [phone]      Again, thank you for allowing me to participate in the care of your patient.      Sincerely,    Lloyd Ledesma MD

## 2018-01-29 NOTE — NURSING NOTE
"Brigitte Robles's goals for this visit include:   Chief Complaint   Patient presents with     Diabetes       She requests these members of her care team be copied on today's visit information: Kehr, Kristen M      PCP: Kehr, Kristen M    Referring Provider:  No referring provider defined for this encounter.    Chief Complaint   Patient presents with     Diabetes       Initial /87  Pulse 74  Temp 98.3  F (36.8  C)  Ht 1.651 m (5' 5\")  Wt 88.6 kg (195 lb 7 oz)  SpO2 98%  BMI 32.52 kg/m2 Estimated body mass index is 32.52 kg/(m^2) as calculated from the following:    Height as of this encounter: 1.651 m (5' 5\").    Weight as of this encounter: 88.6 kg (195 lb 7 oz).  Medication Reconciliation: complete    Do you need any medication refills at today's visit? Yes    Zuleyma Abrams LPN      "

## 2018-04-21 DIAGNOSIS — E10.65 TYPE 1 DIABETES MELLITUS WITH HYPERGLYCEMIA (H): ICD-10-CM

## 2018-04-23 RX ORDER — FLURBIPROFEN SODIUM 0.3 MG/ML
SOLUTION/ DROPS OPHTHALMIC
Qty: 100 EACH | Refills: 3 | Status: SHIPPED | OUTPATIENT
Start: 2018-04-23 | End: 2018-09-04

## 2018-04-29 ENCOUNTER — MYC MEDICAL ADVICE (OUTPATIENT)
Dept: ENDOCRINOLOGY | Facility: CLINIC | Age: 43
End: 2018-04-29

## 2018-04-29 DIAGNOSIS — E10.9 TYPE 1 DIABETES MELLITUS WITHOUT COMPLICATION (H): ICD-10-CM

## 2018-04-30 RX ORDER — BLOOD-GLUCOSE TRANSMITTER
1 EACH MISCELLANEOUS
Qty: 1 EACH | Refills: 3 | Status: SHIPPED | OUTPATIENT
Start: 2018-04-30 | End: 2019-02-04

## 2018-04-30 RX ORDER — BLOOD-GLUCOSE SENSOR
1 EACH MISCELLANEOUS
Qty: 12 EACH | Refills: 3 | Status: SHIPPED | OUTPATIENT
Start: 2018-04-30 | End: 2019-05-08

## 2018-05-24 DIAGNOSIS — E10.65 TYPE 1 DIABETES MELLITUS WITH HYPERGLYCEMIA (H): ICD-10-CM

## 2018-06-01 ENCOUNTER — TRANSFERRED RECORDS (OUTPATIENT)
Dept: HEALTH INFORMATION MANAGEMENT | Facility: CLINIC | Age: 43
End: 2018-06-01

## 2018-06-04 ENCOUNTER — OFFICE VISIT (OUTPATIENT)
Dept: ENDOCRINOLOGY | Facility: CLINIC | Age: 43
End: 2018-06-04
Payer: COMMERCIAL

## 2018-06-04 VITALS
WEIGHT: 203 LBS | HEART RATE: 77 BPM | HEIGHT: 65 IN | DIASTOLIC BLOOD PRESSURE: 86 MMHG | BODY MASS INDEX: 33.82 KG/M2 | SYSTOLIC BLOOD PRESSURE: 137 MMHG

## 2018-06-04 DIAGNOSIS — E10.9 TYPE 1 DIABETES MELLITUS WITHOUT COMPLICATION (H): ICD-10-CM

## 2018-06-04 DIAGNOSIS — Z12.31 VISIT FOR SCREENING MAMMOGRAM: ICD-10-CM

## 2018-06-04 LAB — HBA1C MFR BLD: 6.8 % (ref 0–5.7)

## 2018-06-04 PROCEDURE — 36415 COLL VENOUS BLD VENIPUNCTURE: CPT | Performed by: INTERNAL MEDICINE

## 2018-06-04 PROCEDURE — 83036 HEMOGLOBIN GLYCOSYLATED A1C: CPT | Performed by: INTERNAL MEDICINE

## 2018-06-04 PROCEDURE — 99214 OFFICE O/P EST MOD 30 MIN: CPT | Performed by: INTERNAL MEDICINE

## 2018-06-04 NOTE — PROGRESS NOTES
Endocrinology Note         Brigitte is a 42 year old female presents today for type 1 diabetes.    HPI  Ms Brigitte Robles is a 42 years old female who is here for type 1 diabetes    She was noted to have increased blurred vision and fatigue in February 2016 and noted to have blood glucose of 300s and A1C of 7.0%. She was initially diagnosed with type 2 diabetes and was started on metformin 500 mg bid which did not control her blood glucose. She was then seen by her PCP who ran more blood test and noted for low c-peptide and positive MONTSERRAT ab and IA2 antibody. She was then started on insulin since.    Interval history:  She has been doing ok.     1) type 1 diabetes: she is currently on insulin degludec (Tresiba) 34 units at night, Novolog 1 unit per 10 gram (normal week) and 1 unit per 8 gram (perimenstrual period week) and correction factor of 1 unit per 50 above 150 mg/dl. A1C today is 6.8% which is the lowest value since she was diagnosed.  Reviewed glucose log, average  glucose 182 (SD 67). According to Dexcom, average glucose 177 (SD 63). Her pattern is postprandial hyperglycemia after lunch and dinner.     She has been working on weight loss. She works or does muscle strengthen exercise daily after dinner. She noticed high blood glucose after exercise.    She is still considering to be back on insulin pump but is afraid of it due to past experience. She will think about it about it and contact CDE josephine. She did try insulin pump (Medtronic 630G) in 2017 but did not like it because her glucose seemed to be more fluctuated.    2) hypothyroidism: She has history of hypothyroidism for the past 11 years. It was diagnosed about 6 months postpartum. She is currently on Synthroid 112 mcg x6 days per week and 224 mcg x1 day per week. TSH in Nov 2017 was 1.06.    Past Medical History  Type 1 diabetes  Hypothyroidism    Allergies  Allergies   Allergen Reactions     Levothyroxine      Dizzy to generic --- not to synthroid  "    Medications  Current Outpatient Prescriptions   Medication Sig Dispense Refill     B-D U/F insulin pen needle USE 4 DAILY OR AS DIRECTED. 100 each 3     B-D U/F insulin pen needle USE 6 NEEDLES DAILY FOR INJECTIONS. 200 each 1     blood glucose monitoring (ONETOUCH VERIO IQ) test strip Use to test blood sugar 10 times daily or as directed. 900 strip 3     Continuous Blood Gluc Sensor (DEXCOM G5 MOB/G4 PLAT SENSOR) MISC 1 patch every 7 days Please replace sensor patch every seven days. 12 each 3     Continuous Blood Gluc Transmit (DEXCOM G5 MOBILE TRANSMITTER) MISC 1 each every 3 months 1 each 3     Continuous Glucose Monitor (DEXCOM G4 PLATINUM TRANSMITTER) KIT CHANGE EVERY 6 MONTHS 1 kit 1     insulin aspart (NOVOLOG FLEXPEN) 100 UNIT/ML injection 1 unit/10 gms of carbs with each meal, plus the scale Max usage of insulin is 50 units daily. 30 mL 3     insulin degludec (TRESIBA) 200 UNIT/ML pen Inject 30 Units Subcutaneous daily (Patient taking differently: Inject 34 Units Subcutaneous daily 34 units daily - with additional \"during hormone times\") 18 mL 3     SYNTHROID 112 MCG tablet Take 1 tablet (112 mcg) by mouth daily 112 mcg 6 days a week and 2 tablets once a week 96 tablet 1     glucagon (GLUCAGON EMERGENCY) 1 MG kit Inject 1 mg into the muscle once for 1 dose 1 mg 0     insulin aspart (NOVOLOG FLEXPEN) 100 UNIT/ML injection Use 1 unit per 10 gm carb with breakfast and lunch and 1 unit per 8 gm carb w/dinner 30 mL 3     insulin pen needle 31G X 6 MM Use 6 daily or as directed. 200 each 12     ONETOUCH DELICA LANCETS 33G MISC 1 Box 6 times daily 100 each 12     Family History  Type 1 diabetes in her father, mother, paternal aunt, paternal uncle, brother and sister  Type 2 diabetes in her grandmother  Heart in her maternal grandfather and maternal grandmother  hypothyroid in her brother, father, maternal grandfather, maternal grandmother, mother, and sister.     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    ROS  Constitutional: feeling good so far, working on weight loss but has difficult time losing weight  Eyes: no vision change, diplopia, no more blurred vision  Cardiovascular: no chest pain, palpitations  Respiratory: no dyspnea, cough, shortness of breath  GI: no nausea, vomiting, diarrhea or constipation, no abdominal pain   : no change in urine, no dysuria   Musculoskeletal: no joint swelling   Integumentary: occasional hive  Neuro: no numbness or tingling, no tremor, no headache   Endo: no polyuria or polydipsia, no temperature intolerance   Psych: she denied snoring, sleep well    Physical Exam  /86 (BP Location: Left arm)  Pulse 77  Ht 1.651 m (5' 5\")  Wt 92.1 kg (203 lb)  BMI 33.78 kg/m2  Body mass index is 33.78 kg/(m^2).  Constitutional: no distress, comfortable, pleasant   Eyes: anicteric  Musculoskeletal: no edema   Skin: no jaundice   Neurological: normal gait, no tremor  Psychological: appropriate mood     RESULTS    Lab Results   Component Value Date    A1C 6.8 06/04/2018    A1C 7.4 01/29/2018    A1C 7.4 09/18/2017    A1C 7.2 09/18/2017    A1C 7.2 05/08/2017        Ref. Range 2/5/2016 11:41   Glutamic Acid Decarboxylase Antibody Unknown 45.7 (H)   IA-2 Antibody Unknown 3.4 (H)   Islet Cell Antibody IgG Unknown <1:4...      Ref. Range 2/10/2016 08:35   C-Peptide Latest Ref Range: 0.9-6.9 ng/mL 0.7 (L)     ENDO DIABETES Latest Ref Rng & Units 9/18/2017   CHOLESTEROL <200 mg/dL 159   LDL CHOLESTEROL, CALCULATED <100 mg/dL 63   HDL CHOLESTEROL >49 mg/dL 77   NON HDL CHOLESTEROL <130 mg/dL 82   TRIGLYCERIDES <150 mg/dL 93     ENDO DIABETES Latest Ref Rng & Units 9/18/2017   CREATININE 0.52 - 1.04 mg/dL 0.74     ENDO DIABETES Latest Ref Rng & Units 9/18/2017   ALBUMIN URINE MG/L mg/L <5   ALBUMIN URINE MG/G CR 0 - 25 mg/g Cr Unable to calculate due to low value         ASSESSMENT:    Ms Brigitte Robles is a 42 years old " female who is here for follow up type 1 diabetes    1) type 1 diabetes: diagnosed type 1 diabetes in Feb 2016. A1C is good at 6.8% but has postprandial hyperglycemia  - continue Tresiba 34 units daily  - Novolog 1 unit per 10 gram (normal week) and 1 unit per 8 gram (perimenstrual period week)   - correction factor of 1 unit per 50 above 150 mg/dl.  - she will continue to check blood glucose 3-4 times per day   - she will reconsider insulin pump again and will contact CDE.    2) Hypothyroidism: clinically and biochemically euthyroid. Continue with Synthroid 112 mcg x6 days per week and 224 mcg x1 day per week. TSH in Nov 2017 was 1.06.    PLAN:   - continue insulin degludec (Tresiba) 34 units daily  - Novolog 1 unit per 10 gram (normal week) and 1 unit per 8 gram (perimenstrual period week)   - continue correction factor 1 unit per 50 above 150 mg/dl  - encourage her to reconsider insulin pump  - RTC 4 months    Lloyd Ledesma MD  Endocrinology  [phone]

## 2018-06-04 NOTE — PATIENT INSTRUCTIONS
Thank you for choosing the [facility], Department of Endocrinology. It has been a pleasure servicing you today. Please contact us at [phone] with any questions. If you need to speak to an Endocrinologist and it is after 5:00 pm or on a weekend, please call the On-Call Endocrinologist at [phone].

## 2018-06-04 NOTE — NURSING NOTE
"Brigitte Robles's goals for this visit include: Diabetes follow-up  She requests these members of her care team be copied on today's visit information: Yes    PCP: Kehr, Kristen M    Referring Provider:  No referring provider defined for this encounter.    /86 (BP Location: Left arm)  Pulse 77  Ht 1.651 m (5' 5\")  Wt 92.1 kg (203 lb)  BMI 33.78 kg/m2    Do you need any medication refills at today's visit? Yes  "

## 2018-06-04 NOTE — LETTER
6/4/2018         RE: Brigitte Robles  [address] [facility] [address]        Dear Colleague,    Thank you for referring your patient, Brigitte Robles, to the [facility]. Please see a copy of my visit note below.         Endocrinology Note         Brigitte is a 42 year old female presents today for type 1 diabetes.    HPI  Ms Brigitte Robles is a 42 years old female who is here for type 1 diabetes    She was noted to have increased blurred vision and fatigue in February 2016 and noted to have blood glucose of 300s and A1C of 7.0%. She was initially diagnosed with type 2 diabetes and was started on metformin 500 mg bid which did not control her blood glucose. She was then seen by her PCP who ran more blood test and noted for low c-peptide and positive MONTSERRAT ab and IA2 antibody. She was then started on insulin since.    Interval history:  She has been doing ok.     1) type 1 diabetes: she is currently on insulin degludec (Tresiba) 34 units at night, Novolog 1 unit per 10 gram (normal week) and 1 unit per 8 gram (perimenstrual period week) and correction factor of 1 unit per 50 above 150 mg/dl. A1C today is 6.8% which is the lowest value since she was diagnosed.  Reviewed glucose log, average  glucose 182 (SD 67). According to Dexcom, average glucose 177 (SD 63). Her pattern is postprandial hyperglycemia after lunch and dinner.     She has been working on weight loss. She works or does muscle strengthen exercise daily after dinner. She noticed high blood glucose after exercise.    She is still considering to be back on insulin pump but is afraid of it due to past experience. She will think about it about it and contact KIRAN burton. She did try insulin pump (Medtronic 630G) in 2017 but did not like it because her glucose seemed to be more fluctuated.    2) hypothyroidism: She has history of hypothyroidism for the past 11 years. It was diagnosed about 6 months postpartum. She is  "currently on Synthroid 112 mcg x6 days per week and 224 mcg x1 day per week. TSH in Nov 2017 was 1.06.    Past Medical History  Type 1 diabetes  Hypothyroidism    Allergies  Allergies   Allergen Reactions     Levothyroxine      Dizzy to generic --- not to synthroid     Medications  Current Outpatient Prescriptions   Medication Sig Dispense Refill     B-D U/F insulin pen needle USE 4 DAILY OR AS DIRECTED. 100 each 3     B-D U/F insulin pen needle USE 6 NEEDLES DAILY FOR INJECTIONS. 200 each 1     blood glucose monitoring (ONETOUCH VERIO IQ) test strip Use to test blood sugar 10 times daily or as directed. 900 strip 3     Continuous Blood Gluc Sensor (DEXCOM G5 MOB/G4 PLAT SENSOR) MISC 1 patch every 7 days Please replace sensor patch every seven days. 12 each 3     Continuous Blood Gluc Transmit (DEXCOM G5 MOBILE TRANSMITTER) MISC 1 each every 3 months 1 each 3     Continuous Glucose Monitor (DEXCOM G4 PLATINUM TRANSMITTER) KIT CHANGE EVERY 6 MONTHS 1 kit 1     insulin aspart (NOVOLOG FLEXPEN) 100 UNIT/ML injection 1 unit/10 gms of carbs with each meal, plus the scale Max usage of insulin is 50 units daily. 30 mL 3     insulin degludec (TRESIBA) 200 UNIT/ML pen Inject 30 Units Subcutaneous daily (Patient taking differently: Inject 34 Units Subcutaneous daily 34 units daily - with additional \"during hormone times\") 18 mL 3     SYNTHROID 112 MCG tablet Take 1 tablet (112 mcg) by mouth daily 112 mcg 6 days a week and 2 tablets once a week 96 tablet 1     glucagon (GLUCAGON EMERGENCY) 1 MG kit Inject 1 mg into the muscle once for 1 dose 1 mg 0     insulin aspart (NOVOLOG FLEXPEN) 100 UNIT/ML injection Use 1 unit per 10 gm carb with breakfast and lunch and 1 unit per 8 gm carb w/dinner 30 mL 3     insulin pen needle 31G X 6 MM Use 6 daily or as directed. 200 each 12     ONETOUCH DELICA LANCETS 33G MISC 1 Box 6 times daily 100 each 12     Family History  Type 1 diabetes in her father, mother, paternal aunt, paternal uncle, " "brother and sister  Type 2 diabetes in her grandmother  Heart in her maternal grandfather and maternal grandmother  hypothyroid in her brother, father, maternal grandfather, maternal grandmother, mother, and sister.     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    ROS  Constitutional: feeling good so far, working on weight loss but has difficult time losing weight  Eyes: no vision change, diplopia, no more blurred vision  Cardiovascular: no chest pain, palpitations  Respiratory: no dyspnea, cough, shortness of breath  GI: no nausea, vomiting, diarrhea or constipation, no abdominal pain   : no change in urine, no dysuria   Musculoskeletal: no joint swelling   Integumentary: occasional hive  Neuro: no numbness or tingling, no tremor, no headache   Endo: no polyuria or polydipsia, no temperature intolerance   Psych: she denied snoring, sleep well    Physical Exam  /86 (BP Location: Left arm)  Pulse 77  Ht 1.651 m (5' 5\")  Wt 92.1 kg (203 lb)  BMI 33.78 kg/m2  Body mass index is 33.78 kg/(m^2).  Constitutional: no distress, comfortable, pleasant   Eyes: anicteric  Musculoskeletal: no edema   Skin: no jaundice   Neurological: normal gait, no tremor  Psychological: appropriate mood     RESULTS    Lab Results   Component Value Date    A1C 6.8 06/04/2018    A1C 7.4 01/29/2018    A1C 7.4 09/18/2017    A1C 7.2 09/18/2017    A1C 7.2 05/08/2017        Ref. Range 2/5/2016 11:41   Glutamic Acid Decarboxylase Antibody Unknown 45.7 (H)   IA-2 Antibody Unknown 3.4 (H)   Islet Cell Antibody IgG Unknown <1:4...      Ref. Range 2/10/2016 08:35   C-Peptide Latest Ref Range: 0.9-6.9 ng/mL 0.7 (L)     ENDO DIABETES Latest Ref Rng & Units 9/18/2017   CHOLESTEROL <200 mg/dL 159   LDL CHOLESTEROL, CALCULATED <100 mg/dL 63   HDL CHOLESTEROL >49 mg/dL 77   NON HDL CHOLESTEROL <130 mg/dL 82   TRIGLYCERIDES <150 mg/dL 93     ENDO DIABETES Latest Ref Rng & Units " 9/18/2017   CREATININE 0.52 - 1.04 mg/dL 0.74     ENDO DIABETES Latest Ref Rng & Units 9/18/2017   ALBUMIN URINE MG/L mg/L <5   ALBUMIN URINE MG/G CR 0 - 25 mg/g Cr Unable to calculate due to low value         ASSESSMENT:    Ms Brigitte Robles is a 42 years old female who is here for follow up type 1 diabetes    1) type 1 diabetes: diagnosed type 1 diabetes in Feb 2016. A1C is good at 6.8% but has postprandial hyperglycemia  - continue Tresiba 34 units daily  - Novolog 1 unit per 10 gram (normal week) and 1 unit per 8 gram (perimenstrual period week)   - correction factor of 1 unit per 50 above 150 mg/dl.  - she will continue to check blood glucose 3-4 times per day   - she will reconsider insulin pump again and will contact CDE.    2) Hypothyroidism: clinically and biochemically euthyroid. Continue with Synthroid 112 mcg x6 days per week and 224 mcg x1 day per week. TSH in Nov 2017 was 1.06.    PLAN:   - continue insulin degludec (Tresiba) 34 units daily  - Novolog 1 unit per 10 gram (normal week) and 1 unit per 8 gram (perimenstrual period week)   - continue correction factor 1 unit per 50 above 150 mg/dl  - encourage her to reconsider insulin pump  - RTC 4 months    Lloyd Ledesma MD  Endocrinology  [phone]

## 2018-06-04 NOTE — MR AVS SNAPSHOT
After Visit Summary   6/4/2018    Brigitte Robles    MRN: [id number]           Patient Information     Date Of Birth          1975        Visit Information        Provider Department      6/4/2018 2:30 PM Lloyd Ledesma MD [facility]        Today's Diagnoses     Type 1 diabetes mellitus without complication (H)          Care Instructions    Thank you for choosing the [facility]. It has been a pleasure servicing you today. Please contact us at [phone] with any questions. If you need to speak to an Endocrinologist and it is after 5:00 pm or on a weekend, please call the On-Call Endocrinologist at [phone].              Follow-ups after your visit        Your next 10 appointments already scheduled     Jun 04, 2018  3:45 PM CDT   Screening Mammogram with FAY MG MA TECH   [facility] ([facility])    [address] [place] [id number] [phone]           Do NOT use body powder, lotions, perfume or deodorant the day of the exam.  If your last mammogram was not done at [place], please bring your mammogram films. We will need the name of your provider to send a copy of your report.  A mammogram may be covered on an annual or biannual basis, please check with your insurance company.            Oct 15, 2018  3:45 PM CDT   Return Visit with Lloyd Ledesma MD, MG ENDO NURSE   [facility] ([facility])    [address] [place] [id number] [phone]              Who to contact     If you have questions or need follow up information about today's clinic visit or your schedule please contact [facility] [[email or web address]] directly at [phone].  Normal or non-critical lab and imaging results will be communicated to you by MyChart, letter or phone within 4 business days after the clinic has received the results. If  "you do not hear from us within 7 days, please contact the clinic through [employer] or phone. If you have a critical or abnormal lab result, we will notify you by phone as soon as possible.  Submit refill requests through [employer] or call your pharmacy and they will forward the refill request to us. Please allow 3 business days for your refill to be completed.          Additional Information About Your Visit        [employer] Information     [employer] gives you secure access to your electronic health record. If you see a primary care provider, you can also send messages to your care team and make appointments. If you have questions, please call your primary care clinic.  If you do not have a primary care provider, please call [phone] and they will assist you.      [employer] is an electronic gateway that provides easy, online access to your medical records. With [employer], you can request a clinic appointment, read your test results, renew a prescription or communicate with your care team.     To access your existing account, please contact your [facility] or call [phone] for assistance.        Care EveryWhere ID     This is your Care EveryWhere ID. This could be used by other organizations to access your [facility] medical records  [id number]        Your Vitals Were     Pulse Height BMI (Body Mass Index)             77 1.651 m (5' 5\") 33.78 kg/m2          Blood Pressure from Last 3 Encounters:   06/04/18 137/86   01/29/18 129/87   11/10/17 131/88    Weight from Last 3 Encounters:   06/04/18 92.1 kg (203 lb)   01/29/18 88.6 kg (195 lb 7 oz)   11/10/17 89.8 kg (198 lb)              We Performed the Following     Hemoglobin A1c POCT          Today's Medication Changes          These changes are accurate as of 6/4/18  3:07 PM.  If you have any questions, ask your nurse or doctor.               These medicines have changed or have updated prescriptions.        Dose/Directions    insulin " degludec 200 UNIT/ML pen   Commonly known as:  TRESIBA   This may have changed:    - how much to take  - additional instructions   Used for:  Type 1 diabetes mellitus without complication (H)        Dose:  30 Units   Inject 30 Units Subcutaneous daily   Quantity:  18 mL   Refills:  3                Primary Care Provider Office Phone # Fax #    Kristen M Kehr, PA-C [phone] [address] [facility] [address]        Equal Access to Services     KIN EAR[place] : Hadii aad ku hadasho Soomaali, waaxda luqadaha, qaybta kaalmada adeegyada, waxay idiin hayaan adeeg kharash la'aan . So [facility] [phone].    ATENCIÓN: Si habla español, tiene a espinosa disposición servicios gratuitos de asistencia lingüística. [facility] [phone].    We comply with applicable federal civil rights laws and [place] laws. We do not discriminate on the basis of race, color, national origin, age, disability, sex, sexual orientation, or gender identity.            Thank you!     Thank you for choosing [facility]  for your care. Our goal is always to provide you with excellent care. Hearing back from our patients is one way we can continue to improve our services. Please take a few minutes to complete the written survey that you may receive in the mail after your visit with us. Thank you!             Your Updated Medication List - Protect others around you: Learn how to safely use, store and throw away your medicines at www.disposemymeds.org.          This list is accurate as of 6/4/18  3:07 PM.  Always use your most recent med list.                   Brand Name Dispense Instructions for use Diagnosis    blood glucose monitoring test strip    ONETOUCH VERIO IQ    900 strip    Use to test blood sugar 10 times daily or as directed.    Type 1 diabetes mellitus with hyperglycemia (H)       DEXCOM G4 PLATINUM TRANSMITTER Kit     1 kit    CHANGE EVERY 6 MONTHS    Type 1 diabetes mellitus without complication (H)        DEXCOM G5 MOB/G4 PLAT SENSOR Misc     12 each    1 patch every 7 days Please replace sensor patch every seven days.    Type 1 diabetes mellitus without complication (H)       DEXCOM G5 MOBILE TRANSMITTER Misc     1 each    1 each every 3 months    Type 1 diabetes mellitus without complication (H)       glucagon 1 MG kit    GLUCAGON EMERGENCY    1 mg    Inject 1 mg into the muscle once for 1 dose    Type 1 diabetes mellitus without complication (H)       * insulin aspart 100 UNIT/ML injection    NovoLOG FLEXPEN    30 mL    1 unit/10 gms of carbs with each meal, plus the scale Max usage of insulin is 50 units daily.    Type 1 diabetes mellitus with hyperglycemia (H)       * insulin aspart 100 UNIT/ML injection    NovoLOG FLEXPEN    30 mL    Use 1 unit per 10 gm carb with breakfast and lunch and 1 unit per 8 gm carb w/dinner    Type 1 diabetes mellitus with hyperglycemia (H)       insulin degludec 200 UNIT/ML pen    TRESIBA    18 mL    Inject 30 Units Subcutaneous daily    Type 1 diabetes mellitus without complication (H)       * insulin pen needle 31G X 6 MM     200 each    Use 6 daily or as directed.    Type 1 diabetes mellitus with hyperglycemia (H)       * B-D U/F 31G X 5 MM   Generic drug:  insulin pen needle     200 each    USE 6 NEEDLES DAILY FOR INJECTIONS.    Type 1 diabetes mellitus with hyperglycemia (H)       * B-D U/F 31G X 5 MM   Generic drug:  insulin pen needle     100 each    USE 4 DAILY OR AS DIRECTED.    Type 1 diabetes mellitus with hyperglycemia (H)       ONETOUCH DELICA LANCETS 33G Misc     100 each    1 Box 6 times daily    Type 1 diabetes mellitus with hyperglycemia (H)       SYNTHROID 112 MCG tablet   Generic drug:  levothyroxine     96 tablet    Take 1 tablet (112 mcg) by mouth daily 112 mcg 6 days a week and 2 tablets once a week    Hypothyroidism, unspecified type, Type 1 diabetes mellitus without complication (H)       * Notice:  This list has 5 medication(s) that are the same as  other medications prescribed for you. Read the directions carefully, and ask your doctor or other care provider to review them with you.

## 2018-06-12 DIAGNOSIS — E10.9 TYPE 1 DIABETES MELLITUS WITHOUT COMPLICATION (H): ICD-10-CM

## 2018-06-12 RX ORDER — INSULIN DEGLUDEC 200 U/ML
INJECTION, SOLUTION SUBCUTANEOUS
Qty: 18 ML | Refills: 2 | Status: SHIPPED | OUTPATIENT
Start: 2018-06-12 | End: 2019-02-04

## 2018-07-11 DIAGNOSIS — E03.9 HYPOTHYROIDISM, UNSPECIFIED TYPE: ICD-10-CM

## 2018-07-11 DIAGNOSIS — E10.9 TYPE 1 DIABETES MELLITUS WITHOUT COMPLICATION (H): ICD-10-CM

## 2018-07-11 RX ORDER — LEVOTHYROXINE SODIUM 112 MCG
TABLET ORAL
Qty: 96 TABLET | Refills: 1 | Status: SHIPPED | OUTPATIENT
Start: 2018-07-11 | End: 2018-10-15

## 2018-09-04 DIAGNOSIS — E10.65 TYPE 1 DIABETES MELLITUS WITH HYPERGLYCEMIA (H): ICD-10-CM

## 2018-09-05 RX ORDER — FLURBIPROFEN SODIUM 0.3 MG/ML
SOLUTION/ DROPS OPHTHALMIC
Qty: 100 EACH | Refills: 3 | Status: SHIPPED | OUTPATIENT
Start: 2018-09-05 | End: 2018-12-26

## 2018-10-02 ENCOUNTER — TELEPHONE (OUTPATIENT)
Dept: ENDOCRINOLOGY | Facility: CLINIC | Age: 43
End: 2018-10-02

## 2018-10-02 NOTE — TELEPHONE ENCOUNTER
Forms received from pt.  Left message for her to call back to clinic to see when form is due to [place] [employer].  Ladan Villa CMA

## 2018-10-02 NOTE — TELEPHONE ENCOUNTER
Brigitte returned a call to the Endo Clinic.  The form for the [place] [employer] is due before December 1, 2018.  Thank you.

## 2018-10-02 NOTE — TELEPHONE ENCOUNTER
Will place form in Dr Desai's folder and await her return to clinic to complete.  Ladan Villa, CMA

## 2018-10-08 NOTE — TELEPHONE ENCOUNTER
[employer] form completed, faxed to [employer] at [phone], copy made for scanning and original mailed to patients home address.     Marielena Matthew CMA.

## 2018-10-15 ENCOUNTER — OFFICE VISIT (OUTPATIENT)
Dept: ENDOCRINOLOGY | Facility: CLINIC | Age: 43
End: 2018-10-15
Payer: COMMERCIAL

## 2018-10-15 VITALS
BODY MASS INDEX: 34.88 KG/M2 | SYSTOLIC BLOOD PRESSURE: 145 MMHG | OXYGEN SATURATION: 97 % | WEIGHT: 209.6 LBS | HEART RATE: 83 BPM | DIASTOLIC BLOOD PRESSURE: 91 MMHG

## 2018-10-15 DIAGNOSIS — E10.9 TYPE 1 DIABETES MELLITUS WITHOUT COMPLICATION (H): ICD-10-CM

## 2018-10-15 DIAGNOSIS — E03.9 HYPOTHYROIDISM, UNSPECIFIED TYPE: ICD-10-CM

## 2018-10-15 DIAGNOSIS — E10.65 TYPE 1 DIABETES MELLITUS WITH HYPERGLYCEMIA (H): ICD-10-CM

## 2018-10-15 LAB
CREAT SERPL-MCNC: 0.73 MG/DL (ref 0.52–1.04)
CREAT UR-MCNC: 26 MG/DL
GFR SERPL CREATININE-BSD FRML MDRD: 87 ML/MIN/1.7M2
HBA1C MFR BLD: 7.1 % (ref 0–5.6)
LDLC SERPL DIRECT ASSAY-MCNC: 86 MG/DL
MICROALBUMIN UR-MCNC: <5 MG/L
MICROALBUMIN/CREAT UR: NORMAL MG/G CR (ref 0–25)
T4 FREE SERPL-MCNC: 1.18 NG/DL (ref 0.76–1.46)
TSH SERPL DL<=0.005 MIU/L-ACNC: 1.78 MU/L (ref 0.4–4)

## 2018-10-15 PROCEDURE — 82565 ASSAY OF CREATININE: CPT | Performed by: INTERNAL MEDICINE

## 2018-10-15 PROCEDURE — 82043 UR ALBUMIN QUANTITATIVE: CPT | Performed by: INTERNAL MEDICINE

## 2018-10-15 PROCEDURE — 84439 ASSAY OF FREE THYROXINE: CPT | Performed by: INTERNAL MEDICINE

## 2018-10-15 PROCEDURE — 83721 ASSAY OF BLOOD LIPOPROTEIN: CPT | Performed by: INTERNAL MEDICINE

## 2018-10-15 PROCEDURE — 36415 COLL VENOUS BLD VENIPUNCTURE: CPT | Performed by: INTERNAL MEDICINE

## 2018-10-15 PROCEDURE — 84443 ASSAY THYROID STIM HORMONE: CPT | Performed by: INTERNAL MEDICINE

## 2018-10-15 PROCEDURE — 83036 HEMOGLOBIN GLYCOSYLATED A1C: CPT | Performed by: INTERNAL MEDICINE

## 2018-10-15 PROCEDURE — 99214 OFFICE O/P EST MOD 30 MIN: CPT | Performed by: INTERNAL MEDICINE

## 2018-10-15 RX ORDER — LEVOTHYROXINE SODIUM 112 MCG
TABLET ORAL
Qty: 96 TABLET | Refills: 3 | Status: SHIPPED | OUTPATIENT
Start: 2018-10-15 | End: 2019-06-11 | Stop reason: DRUGHIGH

## 2018-10-15 NOTE — MR AVS SNAPSHOT
After Visit Summary   10/15/2018    Brigitte Robles    MRN: [id number]           Patient Information     Date Of Birth          1975        Visit Information        Provider Department      10/15/2018 3:45 PM Lloyd Ledesma MD; MG CRUZ NURSE [facility]        Today's Diagnoses     Type 1 diabetes mellitus without complication (H)        Type 1 diabetes mellitus with hyperglycemia (H)        Hypothyroidism, unspecified type          Care Instructions    [facility]-Department of Endocrinology  Fatou Arora RN, Diabetes Educator: [phone]  Clinic Nurses Kina Shepherd: [phone]  Clinic Fax: [phone]  On-Call Endocrine at [facility] (after hours/weekends): [phone] option 4  Scheduling Line: [phone]    Appointment Reminders:  * Please bring meter with for staff to download  * If you are due ONLY for an A1C, it is scheduled with the nurse and will be done in clinic. You do not need to schedule a lab appointment. Fasting is not required for an A1C.  * Refill request should be submitted to your pharmacy. They will contact clinic for approval.                Follow-ups after your visit        Your next 10 appointments already scheduled     Feb 04, 2019  3:45 PM CST   Return Visit with Lloyd Ledesma MD,  ENDO NURSE   [facility] ([facility]    [id number] [place] [id number] [phone]              Future tests that were ordered for you today     Open Future Orders        Priority Expected Expires Ordered    Creatinine Routine  10/15/2019 10/15/2018    TSH Routine  10/15/2019 10/15/2018    T4 free Routine 11/26/2018 1/3/2019 10/15/2018    LDL cholesterol direct Routine  10/15/2019 10/15/2018            Who to contact     If you have questions or need follow up information about today's clinic visit or your schedule please contact [facility]  directly at [phone].  Normal or non-critical lab and imaging results will be communicated to you by [employer], letter or phone within 4 business days after the clinic has received the results. If you do not hear from us within 7 days, please contact the clinic through [employer] or phone. If you have a critical or abnormal lab result, we will notify you by phone as soon as possible.  Submit refill requests through [employer] or call your pharmacy and they will forward the refill request to us. Please allow 3 business days for your refill to be completed.          Additional Information About Your Visit        [employer] Information     [employer] gives you secure access to your electronic health record. If you see a primary care provider, you can also send messages to your care team and make appointments. If you have questions, please call your primary care clinic.  If you do not have a primary care provider, please call [phone] and they will assist you.      [employer] is an electronic gateway that provides easy, online access to your medical records. With [employer] you can request a clinic appointment, read your test results, renew a prescription or communicate with your care team.     To access your existing account, please contact your [facility] or call [phone] for assistance.        Care EveryWhere ID     This is your Care EveryWhere ID. This could be used by other organizations to access your [place] medical records  [id number]        Your Vitals Were     Pulse Pulse Oximetry BMI (Body Mass Index)             83 97% 34.88 kg/m2          Blood Pressure from Last 3 Encounters:   10/15/18 (!) 145/91   06/04/18 137/86   01/29/18 129/87    Weight from Last 3 Encounters:   10/15/18 95.1 kg (209 lb 9.6 oz)   06/04/18 92.1 kg (203 lb)   01/29/18 88.6 kg (195 lb 7 oz)              We Performed the Following     Albumin Random Urine Quantitative with Creat Ratio     Hemoglobin A1c POCT           Where to get your medicines      These medications were sent to [id number] - [place], [place] - [address]  AT COR[address] OF [facility]  [address] [facility] [facility] [address]     Phone:  [phone]     blood glucose monitoring test strip    insulin aspart 100 UNIT/ML injection    SYNTHROID 112 MCG tablet          Primary Care Provider Office Phone # Fax #    Kristen M Kehr, PA-C [phone] [address] [facility] [address]        Equal Access to Services     [facility]: Hadii aad ku hadasho Soomaali, waaxda luqadaha, qaybta kaalmada adeegyada, waxay christinein haykaylee luna . So [facility] [phone].    ATENCIÓN: Si habla español, tiene a espinosa disposición servicios gratuitos de asistencia lingüística. [facility] [phone].    We comply with applicable federal civil rights laws and [place] laws. We do not discriminate on the basis of race, color, national origin, age, disability, sex, sexual orientation, or gender identity.            Thank you!     Thank you for choosing [facility]  for your care. Our goal is always to provide you with excellent care. Hearing back from our patients is one way we can continue to improve our services. Please take a few minutes to complete the written survey that you may receive in the mail after your visit with us. Thank you!             Your Updated Medication List - Protect others around you: Learn how to safely use, store and throw away your medicines at www.disposemymeds.org.          This list is accurate as of 10/15/18  4:27 PM.  Always use your most recent med list.                   Brand Name Dispense Instructions for use Diagnosis    blood glucose monitoring test strip    ONETOUCH VERIO IQ    900 strip    Use to test blood sugar 10 times daily or as directed.    Type 1 diabetes mellitus with hyperglycemia (H), Type 1 diabetes mellitus without complication (H), Hypothyroidism,  "unspecified type       DEXCOM G4 PLATINUM TRANSMITTER Kit     1 kit    CHANGE EVERY 6 MONTHS    Type 1 diabetes mellitus without complication (H)       DEXCOM G5 MOB/G4 PLAT SENSOR Misc     12 each    1 patch every 7 days Please replace sensor patch every seven days.    Type 1 diabetes mellitus without complication (H)       DEXCOM G5 MOBILE TRANSMITTER Misc     1 each    1 each every 3 months    Type 1 diabetes mellitus without complication (H)       glucagon 1 MG kit    GLUCAGON EMERGENCY    1 mg    Inject 1 mg into the muscle once for 1 dose    Type 1 diabetes mellitus without complication (H)       * insulin aspart 100 UNIT/ML injection    NovoLOG FLEXPEN    30 mL    Use 1 unit per 10 gm carb with breakfast and lunch and 1 unit per 8 gm carb w/dinner    Type 1 diabetes mellitus with hyperglycemia (H)       * insulin aspart 100 UNIT/ML injection    NovoLOG FLEXPEN    30 mL    1 unit/10 gms of carbs with each meal, plus the scale Max usage of insulin is 50 units daily.    Type 1 diabetes mellitus with hyperglycemia (H), Type 1 diabetes mellitus without complication (H), Hypothyroidism, unspecified type       * insulin degludec 200 UNIT/ML pen    TRESIBA    18 mL    Inject 34 Units Subcutaneous daily 34 units daily - with additional \"during hormone times\"    Type 1 diabetes mellitus without complication (H)       * TRESIBA FLEXTOUCH 200 UNIT/ML pen   Generic drug:  insulin degludec     18 mL    INJECT 30 UNITS SUBCUTANEOUS DAILY    Type 1 diabetes mellitus without complication (H)       * insulin pen needle 31G X 6 MM     200 each    Use 6 daily or as directed.    Type 1 diabetes mellitus with hyperglycemia (H)       * B-D U/F 31G X 5 MM   Generic drug:  insulin pen needle     200 each    USE 6 NEEDLES DAILY FOR INJECTIONS.    Type 1 diabetes mellitus with hyperglycemia (H)       * B-D U/F 31G X 5 MM   Generic drug:  insulin pen needle     100 each    USE 4 DAILY OR AS DIRECTED.    Type 1 diabetes mellitus with " hyperglycemia (H)       ONETOUCH DELICA LANCETS 33G Misc     100 each    1 Box 6 times daily    Type 1 diabetes mellitus with hyperglycemia (H)       SYNTHROID 112 MCG tablet   Generic drug:  levothyroxine     96 tablet    TAKE 1 TABLET BY MOUTH DAILY FOR 6 DAYS A WEEK AND 2 TABLETS ONCE A WEEK    Hypothyroidism, unspecified type, Type 1 diabetes mellitus without complication (H), Type 1 diabetes mellitus with hyperglycemia (H)       * Notice:  This list has 7 medication(s) that are the same as other medications prescribed for you. Read the directions carefully, and ask your doctor or other care provider to review them with you.

## 2018-10-15 NOTE — PROGRESS NOTES
Endocrinology Note         Brigitte is a 43 year old female presents today for type 1 diabetes.    HPI  Ms Brigitte Robles is a 43 years old female who is here for type 1 diabetes    She was noted to have increased blurred vision and fatigue in February 2016 and noted to have blood glucose of 300s and A1C of 7.0%. She was initially diagnosed with type 2 diabetes and was started on metformin 500 mg bid which did not control her blood glucose. She was then seen by her PCP who ran more blood test and noted for low c-peptide and positive MONTSERRAT ab and IA2 antibody. She was then started on insulin since.    Interval history:  She has been doing well except inability to lose weight. She reports eating well and has regular exercise.    1) type 1 diabetes: she is currently on insulin degludec (Tresiba) 34 units at night, Novolog 1 unit per 10 gram (normal week) and 1 unit per 8 gram (perimenstrual period week) and correction factor of 1 unit per 50 above 150 mg/dl. A1C today is 6.8% which is the lowest value since she was diagnosed.  Reviewed glucose log, average  glucose 170 (SD 73). According to Dexcom, average glucose 174 (SD 61). Her pattern is postprandial hyperglycemia after lunch and dinner.     She is not ready to go to insulin pump due to past experience. She will think about it. She did try insulin pump (Medtronic 630G) in 2017 but did not like it because her glucose seemed to be more fluctuated.    2) hypothyroidism: She has history of hypothyroidism for the past 11 years. It was diagnosed about 6 months postpartum. She is currently on Synthroid 112 mcg x6 days per week and 224 mcg x1 day per week. TSH in Nov 2017 was 1.06.    Past Medical History  Type 1 diabetes  Hypothyroidism    Allergies  Allergies   Allergen Reactions     Levothyroxine      Dizzy to generic --- not to synthroid     Medications  Current Outpatient Prescriptions   Medication Sig Dispense Refill     B-D U/F insulin pen needle USE 4 DAILY OR AS  "DIRECTED. 100 each 3     B-D U/F insulin pen needle USE 6 NEEDLES DAILY FOR INJECTIONS. 200 each 1     blood glucose monitoring (ONETOUCH VERIO IQ) test strip Use to test blood sugar 10 times daily or as directed. 900 strip 3     Continuous Blood Gluc Sensor (DEXCOM G5 MOB/G4 PLAT SENSOR) MISC 1 patch every 7 days Please replace sensor patch every seven days. 12 each 3     Continuous Blood Gluc Transmit (DEXCOM G5 MOBILE TRANSMITTER) MISC 1 each every 3 months 1 each 3     Continuous Glucose Monitor (DEXCOM G4 PLATINUM TRANSMITTER) KIT CHANGE EVERY 6 MONTHS 1 kit 1     insulin aspart (NOVOLOG FLEXPEN) 100 UNIT/ML injection 1 unit/10 gms of carbs with each meal, plus the scale Max usage of insulin is 50 units daily. 30 mL 3     insulin degludec (TRESIBA) 200 UNIT/ML pen Inject 34 Units Subcutaneous daily 34 units daily - with additional \"during hormone times\" 18 mL 3     insulin pen needle 31G X 6 MM Use 6 daily or as directed. 200 each 12     ONETOUCH DELICA LANCETS 33G MISC 1 Box 6 times daily 100 each 12     SYNTHROID 112 MCG tablet TAKE 1 TABLET BY MOUTH DAILY FOR 6 DAYS A WEEK AND 2 TABLETS ONCE A WEEK 96 tablet 1     glucagon (GLUCAGON EMERGENCY) 1 MG kit Inject 1 mg into the muscle once for 1 dose 1 mg 0     insulin aspart (NOVOLOG FLEXPEN) 100 UNIT/ML injection Use 1 unit per 10 gm carb with breakfast and lunch and 1 unit per 8 gm carb w/dinner (Patient not taking: Reported on 10/15/2018) 30 mL 3     TRESIBA FLEXTOUCH 200 UNIT/ML pen INJECT 30 UNITS SUBCUTANEOUS DAILY (Patient not taking: Reported on 10/15/2018) 18 mL 2     Family History  Type 1 diabetes in her father, mother, paternal aunt, paternal uncle, brother and sister  Type 2 diabetes in her grandmother  Heart in her maternal grandfather and maternal grandmother  hypothyroid in her brother, father, maternal grandfather, maternal grandmother, mother, and sister.     Social History  No smoking  Drink alcohol occasionally  No illicit drug  , has " 2 children  Works in the lab    ROS  Constitutional: feeling good so far, working on weight loss but has difficult time losing weight  Eyes: no vision change, diplopia, no more blurred vision  Cardiovascular: no chest pain, palpitations  Respiratory: no dyspnea, cough, shortness of breath  GI: no nausea, vomiting, diarrhea or constipation, no abdominal pain   : no change in urine, no dysuria   Musculoskeletal: no joint swelling   Integumentary: occasional hive  Neuro: no numbness or tingling, no tremor, no headache   Endo: no polyuria or polydipsia, no temperature intolerance   Psych: she denied snoring, sleep well    Physical Exam  BP (!) 145/91 (BP Location: Left arm, Patient Position: Chair, Cuff Size: Adult Regular)  Pulse 83  Wt 95.1 kg (209 lb 9.6 oz)  SpO2 97%  BMI 34.88 kg/m2  Body mass index is 34.88 kg/(m^2).  Constitutional: no distress, comfortable, pleasant   Eyes: anicteric  Musculoskeletal: no edema   Skin: no jaundice   Neurological: normal gait, intact sensation per monofilament test bilaterally (exam 10/15/18), no tremor  Psychological: appropriate mood     RESULTS    Lab Results   Component Value Date    A1C 7.1 10/15/2018    A1C 6.8 06/04/2018    A1C 7.4 01/29/2018    A1C 7.4 09/18/2017    A1C 7.2 09/18/2017        Ref. Range 2/5/2016 11:41   Glutamic Acid Decarboxylase Antibody Unknown 45.7 (H)   IA-2 Antibody Unknown 3.4 (H)   Islet Cell Antibody IgG Unknown <1:4...      Ref. Range 2/10/2016 08:35   C-Peptide Latest Ref Range: 0.9-6.9 ng/mL 0.7 (L)     ENDO DIABETES Latest Ref Rng & Units 9/18/2017   CHOLESTEROL <200 mg/dL 159   LDL CHOLESTEROL, CALCULATED <100 mg/dL 63   HDL CHOLESTEROL >49 mg/dL 77   NON HDL CHOLESTEROL <130 mg/dL 82   TRIGLYCERIDES <150 mg/dL 93     ENDO DIABETES Latest Ref Rng & Units 9/18/2017   CREATININE 0.52 - 1.04 mg/dL 0.74     ENDO DIABETES Latest Ref Rng & Units 9/18/2017   ALBUMIN URINE MG/L mg/L <5   ALBUMIN URINE MG/G CR 0 - 25 mg/g Cr Unable to calculate  due to low value         ASSESSMENT:    Ms Brigitte Robles is a 43 years old female who is here for follow up type 1 diabetes    1) type 1 diabetes: diagnosed type 1 diabetes in Feb 2016. A1C is good at 7.1% but has postprandial hyperglycemia likely due to mismatch of carb and insulin.  - continue Tresiba 34 units daily  - Novolog 1 unit per 10 gram (normal week) and 1 unit per 8 gram (perimenstrual period week)   - correction factor of 1 unit per 50 above 150 mg/dl.  - she will continue to check blood glucose 3-4 times per day   - she is not ready for insulin pump    2) Hypothyroidism: clinically and biochemically euthyroid. Continue with Synthroid 112 mcg x6 days per week and 224 mcg x1 day per week. TSH in Nov 2017 was 1.06. Will check level today.    3) Obesity: BMI 34. Encourage to continue on healthy diet and exercise.    PLAN:   - continue insulin degludec (Tresiba) 34 units daily  - Novolog 1 unit per 10 gram (normal week) and 1 unit per 8 gram (perimenstrual period week)   - continue correction factor 1 unit per 50 above 150 mg/dl  - encourage her to reconsider insulin pump -- she is not ready at this time  - RTC 4 months    Lloyd Ledesma MD  Endocrinology  [phone]

## 2018-10-15 NOTE — PATIENT INSTRUCTIONS
[facility]  Fatou Arora RN, Diabetes Educator: [phone]  Clinic Nurses Kina Shepherd: [phone]  Clinic Fax: [phone]  On-Call Endocrine at [facility] (after hours/weekends): [phone] option 4  Scheduling Line: [phone]    Appointment Reminders:  * Please bring meter with for staff to download  * If you are due ONLY for an A1C, it is scheduled with the nurse and will be done in clinic. You do not need to schedule a lab appointment. Fasting is not required for an A1C.  * Refill request should be submitted to your pharmacy. They will contact clinic for approval.

## 2018-10-15 NOTE — LETTER
10/15/2018         RE: Brigitte Robles  [address] [place] [address]        Dear Colleague,    Thank you for referring your patient, Brigitte Robles, to the [facility]. Please see a copy of my visit note below.         Endocrinology Note         Brigitte is a 43 year old female presents today for type 1 diabetes.    HPI  Ms Brigitte Robles is a 43 years old female who is here for type 1 diabetes    She was noted to have increased blurred vision and fatigue in February 2016 and noted to have blood glucose of 300s and A1C of 7.0%. She was initially diagnosed with type 2 diabetes and was started on metformin 500 mg bid which did not control her blood glucose. She was then seen by her PCP who ran more blood test and noted for low c-peptide and positive MONTSERRAT ab and IA2 antibody. She was then started on insulin since.    Interval history:  She has been doing well except inability to lose weight. She reports eating well and has regular exercise.    1) type 1 diabetes: she is currently on insulin degludec (Tresiba) 34 units at night, Novolog 1 unit per 10 gram (normal week) and 1 unit per 8 gram (perimenstrual period week) and correction factor of 1 unit per 50 above 150 mg/dl. A1C today is 6.8% which is the lowest value since she was diagnosed.  Reviewed glucose log, average  glucose 170 (SD 73). According to Dexcom, average glucose 174 (SD 61). Her pattern is postprandial hyperglycemia after lunch and dinner.     She is not ready to go to insulin pump due to past experience. She will think about it. She did try insulin pump (Medtronic 630G) in 2017 but did not like it because her glucose seemed to be more fluctuated.    2) hypothyroidism: She has history of hypothyroidism for the past 11 years. It was diagnosed about 6 months postpartum. She is currently on Synthroid 112 mcg x6 days per week and 224 mcg x1 day per week. TSH in Nov 2017 was 1.06.    Past Medical History  Type 1  "diabetes  Hypothyroidism    Allergies  Allergies   Allergen Reactions     Levothyroxine      Dizzy to generic --- not to synthroid     Medications  Current Outpatient Prescriptions   Medication Sig Dispense Refill     B-D U/F insulin pen needle USE 4 DAILY OR AS DIRECTED. 100 each 3     B-D U/F insulin pen needle USE 6 NEEDLES DAILY FOR INJECTIONS. 200 each 1     blood glucose monitoring (ONETOUCH VERIO IQ) test strip Use to test blood sugar 10 times daily or as directed. 900 strip 3     Continuous Blood Gluc Sensor (DEXCOM G5 MOB/G4 PLAT SENSOR) MISC 1 patch every 7 days Please replace sensor patch every seven days. 12 each 3     Continuous Blood Gluc Transmit (DEXCOM G5 MOBILE TRANSMITTER) MISC 1 each every 3 months 1 each 3     Continuous Glucose Monitor (DEXCOM G4 PLATINUM TRANSMITTER) KIT CHANGE EVERY 6 MONTHS 1 kit 1     insulin aspart (NOVOLOG FLEXPEN) 100 UNIT/ML injection 1 unit/10 gms of carbs with each meal, plus the scale Max usage of insulin is 50 units daily. 30 mL 3     insulin degludec (TRESIBA) 200 UNIT/ML pen Inject 34 Units Subcutaneous daily 34 units daily - with additional \"during hormone times\" 18 mL 3     insulin pen needle 31G X 6 MM Use 6 daily or as directed. 200 each 12     ONETOUCH DELICA LANCETS 33G MISC 1 Box 6 times daily 100 each 12     SYNTHROID 112 MCG tablet TAKE 1 TABLET BY MOUTH DAILY FOR 6 DAYS A WEEK AND 2 TABLETS ONCE A WEEK 96 tablet 1     glucagon (GLUCAGON EMERGENCY) 1 MG kit Inject 1 mg into the muscle once for 1 dose 1 mg 0     insulin aspart (NOVOLOG FLEXPEN) 100 UNIT/ML injection Use 1 unit per 10 gm carb with breakfast and lunch and 1 unit per 8 gm carb w/dinner (Patient not taking: Reported on 10/15/2018) 30 mL 3     TRESIBA FLEXTOUCH 200 UNIT/ML pen INJECT 30 UNITS SUBCUTANEOUS DAILY (Patient not taking: Reported on 10/15/2018) 18 mL 2     Family History  Type 1 diabetes in her father, mother, paternal aunt, paternal uncle, brother and sister  Type 2 diabetes in her " grandmother  Heart in her maternal grandfather and maternal grandmother  hypothyroid in her brother, father, maternal grandfather, maternal grandmother, mother, and sister.     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    ROS  Constitutional: feeling good so far, working on weight loss but has difficult time losing weight  Eyes: no vision change, diplopia, no more blurred vision  Cardiovascular: no chest pain, palpitations  Respiratory: no dyspnea, cough, shortness of breath  GI: no nausea, vomiting, diarrhea or constipation, no abdominal pain   : no change in urine, no dysuria   Musculoskeletal: no joint swelling   Integumentary: occasional hive  Neuro: no numbness or tingling, no tremor, no headache   Endo: no polyuria or polydipsia, no temperature intolerance   Psych: she denied snoring, sleep well    Physical Exam  BP (!) 145/91 (BP Location: Left arm, Patient Position: Chair, Cuff Size: Adult Regular)  Pulse 83  Wt 95.1 kg (209 lb 9.6 oz)  SpO2 97%  BMI 34.88 kg/m2  Body mass index is 34.88 kg/(m^2).  Constitutional: no distress, comfortable, pleasant   Eyes: anicteric  Musculoskeletal: no edema   Skin: no jaundice   Neurological: normal gait, intact sensation per monofilament test bilaterally (exam 10/15/18), no tremor  Psychological: appropriate mood     RESULTS    Lab Results   Component Value Date    A1C 7.1 10/15/2018    A1C 6.8 06/04/2018    A1C 7.4 01/29/2018    A1C 7.4 09/18/2017    A1C 7.2 09/18/2017        Ref. Range 2/5/2016 11:41   Glutamic Acid Decarboxylase Antibody Unknown 45.7 (H)   IA-2 Antibody Unknown 3.4 (H)   Islet Cell Antibody IgG Unknown <1:4...      Ref. Range 2/10/2016 08:35   C-Peptide Latest Ref Range: 0.9-6.9 ng/mL 0.7 (L)     ENDO DIABETES Latest Ref Rng & Units 9/18/2017   CHOLESTEROL <200 mg/dL 159   LDL CHOLESTEROL, CALCULATED <100 mg/dL 63   HDL CHOLESTEROL >49 mg/dL 77   NON HDL CHOLESTEROL <130  mg/dL 82   TRIGLYCERIDES <150 mg/dL 93     ENDO DIABETES Latest Ref Rng & Units 9/18/2017   CREATININE 0.52 - 1.04 mg/dL 0.74     ENDO DIABETES Latest Ref Rng & Units 9/18/2017   ALBUMIN URINE MG/L mg/L <5   ALBUMIN URINE MG/G CR 0 - 25 mg/g Cr Unable to calculate due to low value         ASSESSMENT:    Ms Brigitte Robles is a 43 years old female who is here for follow up type 1 diabetes    1) type 1 diabetes: diagnosed type 1 diabetes in Feb 2016. A1C is good at 7.1% but has postprandial hyperglycemia likely due to mismatch of carb and insulin.  - continue Tresiba 34 units daily  - Novolog 1 unit per 10 gram (normal week) and 1 unit per 8 gram (perimenstrual period week)   - correction factor of 1 unit per 50 above 150 mg/dl.  - she will continue to check blood glucose 3-4 times per day   - she is not ready for insulin pump    2) Hypothyroidism: clinically and biochemically euthyroid. Continue with Synthroid 112 mcg x6 days per week and 224 mcg x1 day per week. TSH in Nov 2017 was 1.06. Will check level today.    3) Obesity: BMI 34. Encourage to continue on healthy diet and exercise.    PLAN:   - continue insulin degludec (Tresiba) 34 units daily  - Novolog 1 unit per 10 gram (normal week) and 1 unit per 8 gram (perimenstrual period week)   - continue correction factor 1 unit per 50 above 150 mg/dl  - encourage her to reconsider insulin pump -- she is not ready at this time  - RTC 4 months    Lloyd Ledesma MD  Endocrinology  [phone]

## 2018-10-15 NOTE — NURSING NOTE
Brigitte Robles's goals for this visit include: DM follow up  She requests these members of her care team be copied on today's visit information: Yes    PCP: Kehr, Kristen M    Referring Provider:  No referring provider defined for this encounter.    BP (!) 145/91 (BP Location: Left arm, Patient Position: Chair, Cuff Size: Adult Regular)  Pulse 83  Wt 95.1 kg (209 lb 9.6 oz)  SpO2 97%  BMI 34.88 kg/m2    Do you need any medication refills at today's visit? Yes

## 2018-10-16 ENCOUNTER — MYC MEDICAL ADVICE (OUTPATIENT)
Dept: ENDOCRINOLOGY | Facility: CLINIC | Age: 43
End: 2018-10-16

## 2018-10-16 DIAGNOSIS — E10.9 DIABETES MELLITUS TYPE 1 (H): Primary | ICD-10-CM

## 2018-10-17 ENCOUNTER — TELEPHONE (OUTPATIENT)
Dept: ENDOCRINOLOGY | Facility: CLINIC | Age: 43
End: 2018-10-17

## 2018-10-17 DIAGNOSIS — E10.9 TYPE 1 DIABETES MELLITUS WITHOUT COMPLICATION (H): Primary | ICD-10-CM

## 2018-10-17 RX ORDER — PROCHLORPERAZINE 25 MG/1
1 SUPPOSITORY RECTAL SEE ADMIN INSTRUCTIONS
Qty: 9 EACH | Refills: 3 | Status: SHIPPED | OUTPATIENT
Start: 2018-10-17 | End: 2019-10-23

## 2018-10-17 RX ORDER — PROCHLORPERAZINE 25 MG/1
1 SUPPOSITORY RECTAL
Qty: 1 EACH | Refills: 3 | Status: SHIPPED | OUTPATIENT
Start: 2018-10-17 | End: 2019-10-23

## 2018-10-17 NOTE — TELEPHONE ENCOUNTER
Spoke with pt. Advised Rx for Dexcom G6 sent to pharmacy. Advised I will put in the mail, an instruction sheet on how to upgrade G5  with G6 software. Pt verbalized understanding.    Fatou Arora RN, BSN, CDE   [facility]

## 2018-10-17 NOTE — TELEPHONE ENCOUNTER
----- Message from Cora Amos LPN sent at 10/16/2018  9:17 AM CDT -----  Regarding: FW: Dexcom upgrade      ----- Message -----     From: Lloyd Ledesma MD     Sent: 10/15/2018   4:36 PM       To: [facility] Endocrinology Adult [place]  Subject: Dexcom upgrade                                   Hi,  Not sure that she could be upgrade to Dexcom G6. Could you please check for her?    Thanks,  Lloyd

## 2018-11-01 RX ORDER — PROCHLORPERAZINE 25 MG/1
1 SUPPOSITORY RECTAL CONTINUOUS
Qty: 1 DEVICE | Refills: 0 | Status: SHIPPED | OUTPATIENT
Start: 2018-11-01 | End: 2024-08-05

## 2018-11-01 NOTE — TELEPHONE ENCOUNTER
Request received from [facility] for Dexcom G6 Sensor.    Prescription completed.    Cora Amos LPN  Clinic Coordinator   Adult Endocrinology and Pediatric Speciality Clinics  [facility]

## 2018-11-05 ENCOUNTER — TELEPHONE (OUTPATIENT)
Dept: ENDOCRINOLOGY | Facility: CLINIC | Age: 43
End: 2018-11-05

## 2018-11-05 NOTE — TELEPHONE ENCOUNTER
Rx for Dexcom G6  faxed to [facility] @ [phone].    Fatou Arora, RN, BSN, CDE   [facility]

## 2018-11-08 ENCOUNTER — TELEPHONE (OUTPATIENT)
Dept: FAMILY MEDICINE | Facility: CLINIC | Age: 43
End: 2018-11-08

## 2018-11-08 NOTE — TELEPHONE ENCOUNTER
Panel Management Review      Patient has the following on her problem list:     Diabetes    ASA: Failed    Last A1C  Lab Results   Component Value Date    A1C 7.1 10/15/2018    A1C 6.8 06/04/2018    A1C 7.4 01/29/2018    A1C 7.4 09/18/2017    A1C 7.2 09/18/2017     A1C tested: MONITOR    Last LDL:    Lab Results   Component Value Date    CHOL 159 09/18/2017     Lab Results   Component Value Date    HDL 77 09/18/2017     Lab Results   Component Value Date    LDL 86 10/15/2018    LDL 63 09/18/2017     Lab Results   Component Value Date    TRIG 93 09/18/2017     Lab Results   Component Value Date    CHOLHDLRATIO 2.4 07/28/2015     Lab Results   Component Value Date    NHDL 82 09/18/2017       Is the patient on a Statin? NO             Is the patient on Aspirin? NO        Last three blood pressure readings:  BP Readings from Last 3 Encounters:   10/15/18 (!) 145/91   06/04/18 137/86   01/29/18 129/87       Date of last diabetes office visit: 10/15/18     Tobacco History:     History   Smoking Status     Never Smoker   Smokeless Tobacco     Never Used           Composite cancer screening  Chart review shows that this patient is due/due soon for the following None  Summary:    Patient is due/failing the following:   Patient is managed by Endocrine.     Action needed:   none    Type of outreach:    none    Questions for provider review:    None                                                                                                                                    Roge GARCIA MA       Chart routed to close .

## 2018-12-04 DIAGNOSIS — E10.65 TYPE 1 DIABETES MELLITUS WITH HYPERGLYCEMIA (H): ICD-10-CM

## 2018-12-04 RX ORDER — INSULIN ASPART 100 [IU]/ML
INJECTION, SOLUTION INTRAVENOUS; SUBCUTANEOUS
Qty: 45 ML | Refills: 0 | Status: SHIPPED | OUTPATIENT
Start: 2018-12-04 | End: 2019-02-04

## 2018-12-04 NOTE — TELEPHONE ENCOUNTER
Requested Medications  NOVOLOG 100 UNITS/ML FLEXPEN  Will file in chart as: NOVOLOG FLEXPEN 100 UNIT/ML soln  USE 1 UNIT PER 10 GM CARB WITH BREAKFAST AND LUNCH AND 1 UNIT PER 8 GM CARB W/DINNER -=50UNITS/DAY       Disp: Not specified (Pharmacy requested 30 Syringe)   Refills: 2     Class: E-Prescribe Start: 12/4/2018   For: Type 1 diabetes mellitus with hyperglycemia (H)  Originally ordered: 2 years ago by Kristen M Kehr, PA-C  Last refill:12/4/2018  To pharmacy: PLEASE SEND REFILLS FOR SILVERIO  Short Acting Insulin Protocol Swcohg99/4 8:05 AM  x Blood pressure less than 140/90 in past 6 months    LDL on file in past 12 months    Microalbumin on file in past 12 months    Serum creatinine on file in past 12 months    HgbA1C in past 3 or 6 months    Patient is age 18 or older    Recent (6 mo) or future (30 days) visit within the authorizing provider's specialty     Last 3 recorded blood pressure readings in Epic:    Vital Signs 1/29/2018 6/4/2018 10/15/2018   Systolic 129 137 145   Diastolic 87 86 91   Pulse 74 77 83   Temperature 98.3     Respirations          Patient scheduled for follow-up office visit with Dr. Ledesma on  2/4/19. Refill completed until office visit.      Kina Vizcaino RN  Endocrine Care Coordinator  [facility]

## 2018-12-06 DIAGNOSIS — E10.65 TYPE 1 DIABETES MELLITUS WITH HYPERGLYCEMIA (H): ICD-10-CM

## 2018-12-07 RX ORDER — FLURBIPROFEN SODIUM 0.3 MG/ML
SOLUTION/ DROPS OPHTHALMIC
Refills: 3 | OUTPATIENT
Start: 2018-12-07

## 2018-12-26 DIAGNOSIS — E10.65 TYPE 1 DIABETES MELLITUS WITH HYPERGLYCEMIA (H): ICD-10-CM

## 2018-12-27 RX ORDER — FLURBIPROFEN SODIUM 0.3 MG/ML
SOLUTION/ DROPS OPHTHALMIC
Qty: 100 EACH | Refills: 1 | Status: SHIPPED | OUTPATIENT
Start: 2018-12-27 | End: 2018-12-31

## 2018-12-31 ENCOUNTER — TELEPHONE (OUTPATIENT)
Dept: FAMILY MEDICINE | Facility: CLINIC | Age: 43
End: 2018-12-31

## 2018-12-31 ENCOUNTER — TELEPHONE (OUTPATIENT)
Dept: ENDOCRINOLOGY | Facility: CLINIC | Age: 43
End: 2018-12-31

## 2018-12-31 DIAGNOSIS — E10.65 TYPE 1 DIABETES MELLITUS WITH HYPERGLYCEMIA (H): ICD-10-CM

## 2018-12-31 NOTE — TELEPHONE ENCOUNTER
Prescription approved per [facility]ill Protocol.  Tamiko Jarrell RN

## 2018-12-31 NOTE — TELEPHONE ENCOUNTER
Left message for patient that prescription was completed today for quantity of 400. Advised to check with pharmacy as insurance may only allow one box at a time.    Encouraged to return call with questions or concerns.    Cora Amos LPN  Clinic Coordinator  Adult Endocrinology and Pediatric Specialty Clinics  [facility]

## 2018-12-31 NOTE — TELEPHONE ENCOUNTER
URGENT! Brigitte's ins requires 90 day fills which is 4 boxes of pen needles. Please send new RX so that Brigitte can get more pen needles. She has been reusing needles while waiting.    Drug: BD UF MINI PEN NEEDLE [id number]  Sig: USE 4 DAILY or as directed.  #100  Date written: 12/27/18.

## 2018-12-31 NOTE — TELEPHONE ENCOUNTER
[facility] Call Center    Phone Message    May a detailed message be left on voicemail: yes    Reason for Call: Other: Pt only rec'd one box of needles.  She should have 3 total.  They were called in last week.  31g needles.    25mm x 5mm       [facility]/PHARMACY #[id number] - [place], [place] - [address],  [address]      Action Taken: Message routed to:  Adult Clinics: Endocrinology p [phone]

## 2019-02-04 ENCOUNTER — OFFICE VISIT (OUTPATIENT)
Dept: ENDOCRINOLOGY | Facility: CLINIC | Age: 44
End: 2019-02-04
Payer: COMMERCIAL

## 2019-02-04 VITALS
SYSTOLIC BLOOD PRESSURE: 178 MMHG | HEIGHT: 64 IN | DIASTOLIC BLOOD PRESSURE: 106 MMHG | HEART RATE: 98 BPM | OXYGEN SATURATION: 97 % | BODY MASS INDEX: 36.02 KG/M2 | WEIGHT: 211 LBS

## 2019-02-04 DIAGNOSIS — E10.9 TYPE 1 DIABETES MELLITUS WITHOUT COMPLICATION (H): ICD-10-CM

## 2019-02-04 DIAGNOSIS — E10.65 TYPE 1 DIABETES MELLITUS WITH HYPERGLYCEMIA (H): ICD-10-CM

## 2019-02-04 DIAGNOSIS — E03.9 HYPOTHYROIDISM, UNSPECIFIED TYPE: ICD-10-CM

## 2019-02-04 LAB — HBA1C MFR BLD: 7.5 % (ref 0–5.6)

## 2019-02-04 PROCEDURE — 36415 COLL VENOUS BLD VENIPUNCTURE: CPT | Performed by: INTERNAL MEDICINE

## 2019-02-04 PROCEDURE — 99214 OFFICE O/P EST MOD 30 MIN: CPT | Performed by: INTERNAL MEDICINE

## 2019-02-04 PROCEDURE — 83036 HEMOGLOBIN GLYCOSYLATED A1C: CPT | Performed by: INTERNAL MEDICINE

## 2019-02-04 ASSESSMENT — MIFFLIN-ST. JEOR: SCORE: 1606.09

## 2019-02-04 NOTE — PATIENT INSTRUCTIONS
- increase Tresiba to 36 units daily  - continue Novolog 1 unit per 10 gram (normal week) and 1 unit per 8 gram (perimenstrual period week) and correction factor of 1 unit per 50 above 150 mg/dl.    If you have any questions, please do not hesitate to call [facility] Endocr[facility]ology [facility] at [phone] and ask for Endocrinology clinic.    Sincerely,    Lloyd Ledesma MD  Endocrinology

## 2019-02-04 NOTE — LETTER
2/4/2019     RE: Brigitte Robles  [address] [facility] [id number]    Dear Colleague,    Thank you for referring your patient, Brigitte Robles, to the [facility]. Please see a copy of my visit note below.         Endocrinology Note         Brigitte is a 43 year old female presents today for type 1 diabetes.    HPI  Ms Brigitte Robles is a 43 years old female who is here for type 1 diabetes    She was noted to have increased blurred vision and fatigue in February 2016 and noted to have blood glucose of 300s and A1C of 7.0%. She was initially diagnosed with type 2 diabetes and was started on metformin 500 mg bid which did not control her blood glucose. She was then seen by her PCP who ran more blood test and noted for low c-peptide and positive MONTSERRAT ab and IA2 antibody. She was then started on insulin since.    Interval history:  She has been doing well except inability to lose weight. She reports less exercise during winter. She also reports some winter blue as well but denied taking medication.    1) type 1 diabetes: she is currently on insulin degludec (Tresiba) 34 units at night, Novolog 1 unit per 10 gram (normal week) and 1 unit per 8 gram (perimenstrual period week) and correction factor of 1 unit per 50 above 150 mg/dl. A1C today is up slightly to 7.5%. Reviewed glucose log and Dexcom, average  glucose 170 (SD 73). According to Dexcom, average glucose 183 (SD 61), Time in range 41%. Her pattern is postprandial hyperglycemia after lunch and dinner and nocturnal hyperglycemia.    She is not ready to go to insulin pump due to past experience. She will think about it. She did try insulin pump (Medtronic 630G) in 2017 but did not like it because her glucose seemed to be more fluctuated.    2) hypothyroidism: She has history of hypothyroidism for the past 11 years. It was diagnosed about 6 months postpartum. She is currently on Synthroid 112 mcg x6 days per week and 224 mcg x1 day  "per week. TSH was 1.78 on 10/15/18    Past Medical History  Type 1 diabetes  Hypothyroidism    Allergies  Allergies   Allergen Reactions     Levothyroxine      Dizzy to generic --- not to synthroid     Medications  Current Outpatient Medications   Medication Sig Dispense Refill     B-D U/F insulin pen needle USE 6 NEEDLES DAILY FOR INJECTIONS. 200 each 1     blood glucose monitoring (ONETOUCH VERIO IQ) test strip Use to test blood sugar 10 times daily or as directed. 900 strip 3     Continuous Blood Gluc  (DEXCOM G6 ) MAGGY 1 Device continuous 1 Device 0     Continuous Blood Gluc Sensor (DEXCOM G5 MOB/G4 PLAT SENSOR) MISC 1 patch every 7 days Please replace sensor patch every seven days. 12 each 3     Continuous Blood Gluc Sensor (DEXCOM G6 SENSOR) MISC 1 each See Admin Instructions Change sensor every 10 days. 9 each 3     Continuous Blood Gluc Transmit (DEXCOM G5 MOBILE TRANSMITTER) MISC 1 each every 3 months 1 each 3     Continuous Blood Gluc Transmit (DEXCOM G6 TRANSMITTER) MISC 1 each every 3 months 1 each 3     Continuous Glucose Monitor (DEXCOM G4 PLATINUM TRANSMITTER) KIT CHANGE EVERY 6 MONTHS 1 kit 1     glucagon (GLUCAGON EMERGENCY) 1 MG kit Inject 1 mg into the muscle once for 1 dose 1 mg 0     insulin aspart (NOVOLOG FLEXPEN) 100 UNIT/ML injection 1 unit/10 gms of carbs with each meal, plus the scale Max usage of insulin is 50 units daily. 30 mL 3     insulin degludec (TRESIBA) 200 UNIT/ML pen Inject 34 Units Subcutaneous daily 34 units daily - with additional \"during hormone times\" 18 mL 3     insulin pen needle (B-D U/F) 31G X 5 MM miscellaneous USE 4 DAILY OR AS DIRECTED. 400 each 1     insulin pen needle 31G X 6 MM Use 6 daily or as directed. 200 each 12     NOVOLOG FLEXPEN 100 UNIT/ML soln USE 1 UNIT PER 10 GM CARB WITH BREAKFAST AND LUNCH AND 1 UNIT PER 8 GM CARB W/DINNER -=50UNITS/DAY 45 mL 0     ONETOUCH DELICA LANCETS 33G MISC 1 Box 6 times daily 100 each 12     SYNTHROID 112 MCG " "tablet TAKE 1 TABLET BY MOUTH DAILY FOR 6 DAYS A WEEK AND 2 TABLETS ONCE A WEEK 96 tablet 3     TRESIBA FLEXTOUCH 200 UNIT/ML pen INJECT 30 UNITS SUBCUTANEOUS DAILY (Patient not taking: Reported on 10/15/2018) 18 mL 2     Family History  Type 1 diabetes in her father, mother, paternal aunt, paternal uncle, brother and sister  Type 2 diabetes in her grandmother  Heart in her maternal grandfather and maternal grandmother  hypothyroid in her brother, father, maternal grandfather, maternal grandmother, mother, and sister.     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    ROS  Constitutional: feeling good so far, working on weight loss but has difficult time losing weight  Eyes: no vision change, diplopia, no more blurred vision  Cardiovascular: no chest pain, palpitations  Respiratory: no dyspnea, cough, shortness of breath  GI: no nausea, vomiting, diarrhea or constipation, no abdominal pain   : no change in urine, no dysuria   Musculoskeletal: no joint swelling   Integumentary: occasional hive  Neuro: no numbness or tingling, no tremor, no headache   Endo: no polyuria or polydipsia, no temperature intolerance   Psych: she denied snoring, sleep well    Physical Exam  BP (!) 178/106   Pulse 98   Ht 1.632 m (5' 4.25\")   Wt 95.7 kg (211 lb)   SpO2 97%   BMI 35.93 kg/m     Body mass index is 35.93 kg/m .  Constitutional: no distress, comfortable, pleasant   Eyes: anicteric  Thyroid: no thyroid enlargement  CVS: RRR, normal S1,S2, no murmur  Lungs: CTA B/L  Abd: soft, NT, no hepatomegaly  Musculoskeletal: no edema   Skin: no jaundice   Neurological: normal gait, intact sensation per monofilament test bilaterally (exam 2/4/19), no tremor  Psychological: appropriate mood     RESULTS  Lab Results   Component Value Date    A1C 7.5 02/04/2019    A1C 7.1 10/15/2018    A1C 6.8 06/04/2018    A1C 7.4 01/29/2018    A1C 7.4 09/18/2017        Ref. Range " 2/5/2016 11:41   Glutamic Acid Decarboxylase Antibody Unknown 45.7 (H)   IA-2 Antibody Unknown 3.4 (H)   Islet Cell Antibody IgG Unknown <1:4...      Ref. Range 2/10/2016 08:35   C-Peptide Latest Ref Range: 0.9-6.9 ng/mL 0.7 (L)     ENDO DIABETES Latest Ref Rng & Units 10/15/2018   LDL CHOLESTEROL DIRECT <100 mg/dL 86     ENDO DIABETES Latest Ref Rng & Units 10/15/2018   ALBUMIN URINE MG/L mg/L <5   ALBUMIN URINE MG/G CR 0 - 25 mg/g Cr Unable to calculate due to low value   CREATININE 0.52 - 1.04 mg/dL 0.73         ASSESSMENT:    Ms Brigitte Robles is a 43 years old female who is here for follow up type 1 diabetes    1) type 1 diabetes: diagnosed type 1 diabetes in Feb 2016. A1C is up slightly to 7.5% but has postprandial hyperglycemia and nocturnal hyperglycemia  - increase Tresiba 36 units daily  - Novolog 1 unit per 10 gram (normal week) and 1 unit per 8 gram (perimenstrual period week)   - correction factor of 1 unit per 50 above 150 mg/dl.  - continue to check blood glucose 3-4 times per day   - she is not ready for insulin pump    2) Hypothyroidism: clinically and biochemically euthyroid. Continue with Synthroid 112 mcg x6 days per week and 224 mcg x1 day per week. TSH in 10/2018 was 1.78.     3) Obesity: BMI 34. Encourage to continue on healthy diet and exercise.    PLAN:   - increase insulin degludec (Tresiba) 36 units daily  - Novolog 1 unit per 10 gram (normal week) and 1 unit per 8 gram (perimenstrual period week)   - continue correction factor 1 unit per 50 above 150 mg/dl  - encourage her to reconsider insulin pump -- she is not ready at this time  - RTC 4 months with lab    Lloyd Ledesma MD  Endocrinology  [phone]  Again, thank you for allowing me to participate in the care of your patient.      Sincerely,      Lloyd Ledesma MD

## 2019-02-04 NOTE — PROGRESS NOTES
Endocrinology Note         Brigitte is a 43 year old female presents today for type 1 diabetes.    HPI  Ms Brigitte Robles is a 43 years old female who is here for type 1 diabetes    She was noted to have increased blurred vision and fatigue in February 2016 and noted to have blood glucose of 300s and A1C of 7.0%. She was initially diagnosed with type 2 diabetes and was started on metformin 500 mg bid which did not control her blood glucose. She was then seen by her PCP who ran more blood test and noted for low c-peptide and positive MONTSERRAT ab and IA2 antibody. She was then started on insulin since.    Interval history:  She has been doing well except inability to lose weight. She reports less exercise during winter. She also reports some winter blue as well but denied taking medication.    1) type 1 diabetes: she is currently on insulin degludec (Tresiba) 34 units at night, Novolog 1 unit per 10 gram (normal week) and 1 unit per 8 gram (perimenstrual period week) and correction factor of 1 unit per 50 above 150 mg/dl. A1C today is up slightly to 7.5%. Reviewed glucose log and Dexcom, average  glucose 170 (SD 73). According to Dexcom, average glucose 183 (SD 61), Time in range 41%. Her pattern is postprandial hyperglycemia after lunch and dinner and nocturnal hyperglycemia.    She is not ready to go to insulin pump due to past experience. She will think about it. She did try insulin pump (Medtronic 630G) in 2017 but did not like it because her glucose seemed to be more fluctuated.    2) hypothyroidism: She has history of hypothyroidism for the past 11 years. It was diagnosed about 6 months postpartum. She is currently on Synthroid 112 mcg x6 days per week and 224 mcg x1 day per week. TSH was 1.78 on 10/15/18    Past Medical History  Type 1 diabetes  Hypothyroidism    Allergies  Allergies   Allergen Reactions     Levothyroxine      Dizzy to generic --- not to synthroid     Medications  Current Outpatient Medications  "  Medication Sig Dispense Refill     B-D U/F insulin pen needle USE 6 NEEDLES DAILY FOR INJECTIONS. 200 each 1     blood glucose monitoring (ONETOUCH VERIO IQ) test strip Use to test blood sugar 10 times daily or as directed. 900 strip 3     Continuous Blood Gluc  (DEXCOM G6 ) MAGGY 1 Device continuous 1 Device 0     Continuous Blood Gluc Sensor (DEXCOM G5 MOB/G4 PLAT SENSOR) MISC 1 patch every 7 days Please replace sensor patch every seven days. 12 each 3     Continuous Blood Gluc Sensor (DEXCOM G6 SENSOR) MISC 1 each See Admin Instructions Change sensor every 10 days. 9 each 3     Continuous Blood Gluc Transmit (DEXCOM G5 MOBILE TRANSMITTER) MISC 1 each every 3 months 1 each 3     Continuous Blood Gluc Transmit (DEXCOM G6 TRANSMITTER) MISC 1 each every 3 months 1 each 3     Continuous Glucose Monitor (DEXCOM G4 PLATINUM TRANSMITTER) KIT CHANGE EVERY 6 MONTHS 1 kit 1     glucagon (GLUCAGON EMERGENCY) 1 MG kit Inject 1 mg into the muscle once for 1 dose 1 mg 0     insulin aspart (NOVOLOG FLEXPEN) 100 UNIT/ML injection 1 unit/10 gms of carbs with each meal, plus the scale Max usage of insulin is 50 units daily. 30 mL 3     insulin degludec (TRESIBA) 200 UNIT/ML pen Inject 34 Units Subcutaneous daily 34 units daily - with additional \"during hormone times\" 18 mL 3     insulin pen needle (B-D U/F) 31G X 5 MM miscellaneous USE 4 DAILY OR AS DIRECTED. 400 each 1     insulin pen needle 31G X 6 MM Use 6 daily or as directed. 200 each 12     NOVOLOG FLEXPEN 100 UNIT/ML soln USE 1 UNIT PER 10 GM CARB WITH BREAKFAST AND LUNCH AND 1 UNIT PER 8 GM CARB W/DINNER -=50UNITS/DAY 45 mL 0     ONETOUCH DELICA LANCETS 33G MISC 1 Box 6 times daily 100 each 12     SYNTHROID 112 MCG tablet TAKE 1 TABLET BY MOUTH DAILY FOR 6 DAYS A WEEK AND 2 TABLETS ONCE A WEEK 96 tablet 3     TRESIBA FLEXTOUCH 200 UNIT/ML pen INJECT 30 UNITS SUBCUTANEOUS DAILY (Patient not taking: Reported on 10/15/2018) 18 mL 2     Family History  Type 1 " "diabetes in her father, mother, paternal aunt, paternal uncle, brother and sister  Type 2 diabetes in her grandmother  Heart in her maternal grandfather and maternal grandmother  hypothyroid in her brother, father, maternal grandfather, maternal grandmother, mother, and sister.     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    ROS  Constitutional: feeling good so far, working on weight loss but has difficult time losing weight  Eyes: no vision change, diplopia, no more blurred vision  Cardiovascular: no chest pain, palpitations  Respiratory: no dyspnea, cough, shortness of breath  GI: no nausea, vomiting, diarrhea or constipation, no abdominal pain   : no change in urine, no dysuria   Musculoskeletal: no joint swelling   Integumentary: occasional hive  Neuro: no numbness or tingling, no tremor, no headache   Endo: no polyuria or polydipsia, no temperature intolerance   Psych: she denied snoring, sleep well    Physical Exam  BP (!) 178/106   Pulse 98   Ht 1.632 m (5' 4.25\")   Wt 95.7 kg (211 lb)   SpO2 97%   BMI 35.93 kg/m    Body mass index is 35.93 kg/m .  Constitutional: no distress, comfortable, pleasant   Eyes: anicteric  Thyroid: no thyroid enlargement  CVS: RRR, normal S1,S2, no murmur  Lungs: CTA B/L  Abd: soft, NT, no hepatomegaly  Musculoskeletal: no edema   Skin: no jaundice   Neurological: normal gait, intact sensation per monofilament test bilaterally (exam 2/4/19), no tremor  Psychological: appropriate mood     RESULTS  Lab Results   Component Value Date    A1C 7.5 02/04/2019    A1C 7.1 10/15/2018    A1C 6.8 06/04/2018    A1C 7.4 01/29/2018    A1C 7.4 09/18/2017        Ref. Range 2/5/2016 11:41   Glutamic Acid Decarboxylase Antibody Unknown 45.7 (H)   IA-2 Antibody Unknown 3.4 (H)   Islet Cell Antibody IgG Unknown <1:4...      Ref. Range 2/10/2016 08:35   C-Peptide Latest Ref Range: 0.9-6.9 ng/mL 0.7 (L)     ENDO DIABETES " Latest Ref Rng & Units 10/15/2018   LDL CHOLESTEROL DIRECT <100 mg/dL 86     ENDO DIABETES Latest Ref Rng & Units 10/15/2018   ALBUMIN URINE MG/L mg/L <5   ALBUMIN URINE MG/G CR 0 - 25 mg/g Cr Unable to calculate due to low value   CREATININE 0.52 - 1.04 mg/dL 0.73         ASSESSMENT:    Ms Brigitte Robles is a 43 years old female who is here for follow up type 1 diabetes    1) type 1 diabetes: diagnosed type 1 diabetes in Feb 2016. A1C is up slightly to 7.5% but has postprandial hyperglycemia and nocturnal hyperglycemia  - increase Tresiba 36 units daily  - Novolog 1 unit per 10 gram (normal week) and 1 unit per 8 gram (perimenstrual period week)   - correction factor of 1 unit per 50 above 150 mg/dl.  - continue to check blood glucose 3-4 times per day   - she is not ready for insulin pump    2) Hypothyroidism: clinically and biochemically euthyroid. Continue with Synthroid 112 mcg x6 days per week and 224 mcg x1 day per week. TSH in 10/2018 was 1.78.     3) Obesity: BMI 34. Encourage to continue on healthy diet and exercise.    PLAN:   - increase insulin degludec (Tresiba) 36 units daily  - Novolog 1 unit per 10 gram (normal week) and 1 unit per 8 gram (perimenstrual period week)   - continue correction factor 1 unit per 50 above 150 mg/dl  - encourage her to reconsider insulin pump -- she is not ready at this time  - RTC 4 months with lab    Lloyd Ledesma MD  Endocrinology  [phone]

## 2019-02-04 NOTE — Clinical Note
Please abstract the following data from this visit with this patient into the appropriate field in Epic:Eye exam with ophthalmology on this date: in June or July 2018 at [facility] [phone]

## 2019-02-04 NOTE — NURSING NOTE
"Brigitte Robles's goals for this visit include:   Chief Complaint   Patient presents with     Diabetes     Thyroid Disease     She requests these members of her care team be copied on today's visit information: Yes    PCP: Kehr, Kristen M    Referring Provider:  Kristen M Kehr, PA-C  [id number] [place] [address]    BP (!) 156/115 (BP Location: Left arm, Patient Position: Sitting, Cuff Size: Adult Regular)   Pulse 98   Ht 1.632 m (5' 4.25\")   Wt 95.7 kg (211 lb)   SpO2 97%   BMI 35.93 kg/m      Do you need any medication refills at today's visit? Yes    "

## 2019-05-03 ASSESSMENT — ENCOUNTER SYMPTOMS
ABDOMINAL PAIN: 0
NERVOUS/ANXIOUS: 1
PARESTHESIAS: 1
EYE PAIN: 0
BREAST MASS: 0
HEARTBURN: 0
SORE THROAT: 0
COUGH: 0
ARTHRALGIAS: 0
DIARRHEA: 0
PALPITATIONS: 0
MYALGIAS: 0
HEMATOCHEZIA: 0
CONSTIPATION: 0
NAUSEA: 0
FREQUENCY: 0
WEAKNESS: 0
JOINT SWELLING: 0
HEADACHES: 1
CHILLS: 0
DIZZINESS: 1
SHORTNESS OF BREATH: 0
FEVER: 0
DYSURIA: 0
HEMATURIA: 0

## 2019-05-08 ENCOUNTER — OFFICE VISIT (OUTPATIENT)
Dept: FAMILY MEDICINE | Facility: CLINIC | Age: 44
End: 2019-05-08
Payer: COMMERCIAL

## 2019-05-08 ENCOUNTER — ANCILLARY PROCEDURE (OUTPATIENT)
Dept: CARDIOLOGY | Facility: CLINIC | Age: 44
End: 2019-05-08
Attending: PHYSICIAN ASSISTANT
Payer: COMMERCIAL

## 2019-05-08 VITALS
SYSTOLIC BLOOD PRESSURE: 135 MMHG | TEMPERATURE: 98.1 F | HEIGHT: 65 IN | WEIGHT: 205 LBS | DIASTOLIC BLOOD PRESSURE: 83 MMHG | HEART RATE: 92 BPM | OXYGEN SATURATION: 99 % | RESPIRATION RATE: 16 BRPM | BODY MASS INDEX: 34.16 KG/M2

## 2019-05-08 DIAGNOSIS — Z00.00 ROUTINE GENERAL MEDICAL EXAMINATION AT A HEALTH CARE FACILITY: Primary | ICD-10-CM

## 2019-05-08 DIAGNOSIS — R00.2 PALPITATIONS: ICD-10-CM

## 2019-05-08 DIAGNOSIS — N94.6 DYSMENORRHEA: ICD-10-CM

## 2019-05-08 LAB
ALBUMIN SERPL-MCNC: 3.5 G/DL (ref 3.4–5)
ALP SERPL-CCNC: 61 U/L (ref 40–150)
ALT SERPL W P-5'-P-CCNC: 23 U/L (ref 0–50)
ANION GAP SERPL CALCULATED.3IONS-SCNC: 2 MMOL/L (ref 3–14)
AST SERPL W P-5'-P-CCNC: 11 U/L (ref 0–45)
BILIRUB SERPL-MCNC: 0.4 MG/DL (ref 0.2–1.3)
BUN SERPL-MCNC: 14 MG/DL (ref 7–30)
CALCIUM SERPL-MCNC: 8.8 MG/DL (ref 8.5–10.1)
CHLORIDE SERPL-SCNC: 107 MMOL/L (ref 94–109)
CO2 SERPL-SCNC: 30 MMOL/L (ref 20–32)
CREAT SERPL-MCNC: 0.72 MG/DL (ref 0.52–1.04)
GFR SERPL CREATININE-BSD FRML MDRD: >90 ML/MIN/{1.73_M2}
GLUCOSE SERPL-MCNC: 152 MG/DL (ref 70–99)
POTASSIUM SERPL-SCNC: 4.2 MMOL/L (ref 3.4–5.3)
PROT SERPL-MCNC: 8.1 G/DL (ref 6.8–8.8)
SODIUM SERPL-SCNC: 139 MMOL/L (ref 133–144)
T4 FREE SERPL-MCNC: 1.43 NG/DL (ref 0.76–1.46)
TSH SERPL DL<=0.005 MIU/L-ACNC: 0.07 MU/L (ref 0.4–4)

## 2019-05-08 PROCEDURE — 99396 PREV VISIT EST AGE 40-64: CPT | Performed by: PHYSICIAN ASSISTANT

## 2019-05-08 PROCEDURE — 93000 ELECTROCARDIOGRAM COMPLETE: CPT | Performed by: PHYSICIAN ASSISTANT

## 2019-05-08 PROCEDURE — 84439 ASSAY OF FREE THYROXINE: CPT | Performed by: PHYSICIAN ASSISTANT

## 2019-05-08 PROCEDURE — 84443 ASSAY THYROID STIM HORMONE: CPT | Performed by: PHYSICIAN ASSISTANT

## 2019-05-08 PROCEDURE — 36415 COLL VENOUS BLD VENIPUNCTURE: CPT | Performed by: PHYSICIAN ASSISTANT

## 2019-05-08 PROCEDURE — 80053 COMPREHEN METABOLIC PANEL: CPT | Performed by: PHYSICIAN ASSISTANT

## 2019-05-08 PROCEDURE — 99214 OFFICE O/P EST MOD 30 MIN: CPT | Mod: 25 | Performed by: PHYSICIAN ASSISTANT

## 2019-05-08 RX ORDER — NORGESTIMATE AND ETHINYL ESTRADIOL 7DAYSX3 28
1 KIT ORAL DAILY
Qty: 84 TABLET | Refills: 3 | Status: SHIPPED | OUTPATIENT
Start: 2019-05-08 | End: 2019-10-08

## 2019-05-08 ASSESSMENT — ENCOUNTER SYMPTOMS
SHORTNESS OF BREATH: 0
PARESTHESIAS: 1
SORE THROAT: 0
NERVOUS/ANXIOUS: 1
EYE PAIN: 0
DIZZINESS: 1
CONSTIPATION: 0
ABDOMINAL PAIN: 0
NAUSEA: 0
MYALGIAS: 0
HEADACHES: 1
COUGH: 0
JOINT SWELLING: 0
PALPITATIONS: 1
DIARRHEA: 0
CHILLS: 0
ARTHRALGIAS: 0
DYSURIA: 0
WEAKNESS: 0
FEVER: 0
HEMATOCHEZIA: 0
HEMATURIA: 0
FREQUENCY: 0
BREAST MASS: 0
HEARTBURN: 0

## 2019-05-08 ASSESSMENT — ANXIETY QUESTIONNAIRES
6. BECOMING EASILY ANNOYED OR IRRITABLE: SEVERAL DAYS
2. NOT BEING ABLE TO STOP OR CONTROL WORRYING: MORE THAN HALF THE DAYS
1. FEELING NERVOUS, ANXIOUS, OR ON EDGE: MORE THAN HALF THE DAYS
5. BEING SO RESTLESS THAT IT IS HARD TO SIT STILL: SEVERAL DAYS
7. FEELING AFRAID AS IF SOMETHING AWFUL MIGHT HAPPEN: MORE THAN HALF THE DAYS
3. WORRYING TOO MUCH ABOUT DIFFERENT THINGS: MORE THAN HALF THE DAYS
GAD7 TOTAL SCORE: 11
IF YOU CHECKED OFF ANY PROBLEMS ON THIS QUESTIONNAIRE, HOW DIFFICULT HAVE THESE PROBLEMS MADE IT FOR YOU TO DO YOUR WORK, TAKE CARE OF THINGS AT HOME, OR GET ALONG WITH OTHER PEOPLE: SOMEWHAT DIFFICULT

## 2019-05-08 ASSESSMENT — MIFFLIN-ST. JEOR: SCORE: 1590.75

## 2019-05-08 ASSESSMENT — PATIENT HEALTH QUESTIONNAIRE - PHQ9
5. POOR APPETITE OR OVEREATING: SEVERAL DAYS
SUM OF ALL RESPONSES TO PHQ QUESTIONS 1-9: 3

## 2019-05-08 ASSESSMENT — PAIN SCALES - GENERAL: PAINLEVEL: NO PAIN (0)

## 2019-05-08 NOTE — PROGRESS NOTES
I have place a 24 Hour Zio Patch on this patient. Patient was given Zio Patch instructions and [employer] contact information. Patient understands when they should remove and return their monitor to [employer].     Roge GARCIA MA

## 2019-05-08 NOTE — PROGRESS NOTES
SUBJECTIVE:   CC: Brigitte Robles is an 43 year old woman who presents for preventive health visit.     Healthy Habits:     Getting at least 3 servings of Calcium per day:  Yes    Bi-annual eye exam:  Yes    Dental care twice a year:  Yes    Sleep apnea or symptoms of sleep apnea:  None    Diet:  Diabetic    Frequency of exercise:  4-5 days/week    Duration of exercise:  30-45 minutes    Taking medications regularly:  Yes    PHQ-2 Total Score: 1    Additional concerns today:  Yes    PROBLEMS TO ADD.....  She has been having palpitations over the past few months. It will happen with no warning. Last for a few minutes and then go away without intervention. She admits that work has been more stressful lately, but overall, has not been feeling very anxious. She is not drinking caffeine or taking any new supplements. She is on thyroid medication and testing was normal 6 months ago. She has noticed that the episodes typically will occur the week prior and the week of her menses. She will also have heavy bleeding and cramping and concerned about hormone changes causing palpitations. She has regular cycles. Her  has had a vasectomy so she has not been on contraception.       Today's PHQ-2 Score:   PHQ-2 ( 1999 Pfizer) 5/3/2019   Q1: Little interest or pleasure in doing things 1   Q2: Feeling down, depressed or hopeless 0   PHQ-2 Score 1   Q1: Little interest or pleasure in doing things Several days   Q2: Feeling down, depressed or hopeless Not at all   PHQ-2 Score 1       Abuse: Current or Past(Physical, Sexual or Emotional)- No  Do you feel safe in your environment? Yes    Social History     Tobacco Use     Smoking status: Never Smoker     Smokeless tobacco: Never Used   Substance Use Topics     Alcohol use: Yes     Comment: rare     If you drink alcohol do you typically have >3 drinks per day or >7 drinks per week? No    Alcohol Use 5/3/2019   Prescreen: >3 drinks/day or >7 drinks/week? No   Prescreen: >3  drinks/day or >7 drinks/week? -   No flowsheet data found.    Reviewed orders with patient.  Reviewed health maintenance and updated orders accordingly - Yes  BP Readings from Last 3 Encounters:   05/08/19 135/83   02/04/19 (!) 178/106   10/15/18 (!) 145/91    Wt Readings from Last 3 Encounters:   05/08/19 93 kg (205 lb)   02/04/19 95.7 kg (211 lb)   10/15/18 95.1 kg (209 lb 9.6 oz)                  Patient Active Problem List   Diagnosis     Depressive disorder, not elsewhere classified     Hypothyroidism     GLUCOCORTICOID DEFICIENT     Anxiety     CARDIOVASCULAR SCREENING; LDL GOAL LESS THAN 160     Seasonal allergic rhinitis     Type 1 diabetes mellitus without complication (H)     Past Surgical History:   Procedure Laterality Date     HC REMOVAL OF TONSILS,12+ Y/O         Social History     Tobacco Use     Smoking status: Never Smoker     Smokeless tobacco: Never Used   Substance Use Topics     Alcohol use: Yes     Comment: rare     Family History   Problem Relation Age of Onset     Diabetes Mother      Hypertension Mother      Thyroid Disease Mother      Diabetes Father         1     Thyroid Disease Father         BORIS'S     Diabetes Sister      Thyroid Disease Sister      Heart Disease Maternal Grandmother         TRIPLE BYPASS     Thyroid Disease Maternal Grandmother      Diabetes Maternal Grandmother         2     Heart Disease Maternal Grandfather         TRIPLE BYPASS     Coronary Artery Disease Maternal Grandfather      Thyroid Disease Maternal Grandfather      Thyroid Disease Brother      Diabetes Paternal Aunt      Diabetes Paternal Uncle      Diabetes Brother         1     Thyroid Disease Brother      Diabetes Nephew         1     Depression Sister      Thyroid Disease Sister      Diabetes Sister         1     Breast Cancer No family hx of      Cancer - colorectal No family hx of          Current Outpatient Medications   Medication Sig Dispense Refill     blood glucose monitoring (ONETOUCH VERIO  "IQ) test strip Use to test blood sugar 10 times daily or as directed. 900 strip 3     Continuous Blood Gluc  (DEXCOM G6 ) MAGGY 1 Device continuous 1 Device 0     Continuous Blood Gluc Sensor (DEXCOM G6 SENSOR) MISC 1 each See Admin Instructions Change sensor every 10 days. 9 each 3     Continuous Blood Gluc Transmit (DEXCOM G6 TRANSMITTER) MISC 1 each every 3 months 1 each 3     insulin aspart (NOVOLOG FLEXPEN) 100 UNIT/ML pen USE 1 UNIT PER 10 GM CARB WITH BREAKFAST AND LUNCH AND 1 UNIT PER 8 GM CARB W/DINNER -=50UNITS/DAY 45 mL 3     insulin degludec (TRESIBA) 200 UNIT/ML pen Inject 34 Units Subcutaneous daily 34 units daily - with additional \"during hormone times\" 18 mL 3     insulin pen needle (B-D U/F) 31G X 5 MM miscellaneous USE 4 DAILY OR AS DIRECTED. 400 each 1     norgestim-eth estrad triphasic (ORTHO TRI-CYCLEN/TRI-SPRINTEC) 0.18/0.215/0.25 MG-35 MCG tablet Take 1 tablet by mouth daily 84 tablet 3     ONETOUCH DELICA LANCETS 33G MISC 1 Box 6 times daily 100 each 12     SYNTHROID 112 MCG tablet TAKE 1 TABLET BY MOUTH DAILY FOR 6 DAYS A WEEK AND 2 TABLETS ONCE A WEEK 96 tablet 3     glucagon (GLUCAGON EMERGENCY) 1 MG kit Inject 1 mg into the muscle once for 1 dose 1 mg 0     Allergies   Allergen Reactions     Levothyroxine      Dizzy to generic --- not to synthroid       Mammogram Screening: Patient under age 50, mutual decision reflected in health maintenance.      Pertinent mammograms are reviewed under the imaging tab.  History of abnormal Pap smear:   NO - age 30-65 PAP every 5 years with negative HPV co-testing recommended  Last 3 Pap and HPV Results:   PAP / HPV Latest Ref Rng & Units 9/25/2017 3/6/2013 12/6/2010   PAP - NIL NIL NIL   HPV 16 DNA NEG:Negative Negative - -   HPV 18 DNA NEG:Negative Negative - -   OTHER HR HPV NEG:Negative Negative - -     PAP / HPV Latest Ref Rng & Units 9/25/2017 3/6/2013 12/6/2010   PAP - NIL NIL NIL   HPV 16 DNA NEG:Negative Negative - -   HPV 18 DNA " "NEG:Negative Negative - -   OTHER HR HPV NEG:Negative Negative - -     Reviewed and updated as needed this visit by clinical staff  Tobacco  Allergies  Meds  Med Hx  Surg Hx  Fam Hx  Soc Hx        Reviewed and updated as needed this visit by Provider        Past Medical History:   Diagnosis Date     Anxiety      DEPRESSIVE DISORDER NEC 2/8/2003     Type 1 diabetes mellitus without complication (H) 2/17/2016     Unspecified hypothyroidism 6/2003      Past Surgical History:   Procedure Laterality Date     HC REMOVAL OF TONSILS,13+ Y/O         Review of Systems   Constitutional: Negative for chills and fever.   HENT: Negative for congestion, ear pain, hearing loss and sore throat.    Eyes: Negative for pain and visual disturbance.   Respiratory: Negative for cough and shortness of breath.    Cardiovascular: Positive for palpitations (as above). Negative for chest pain and peripheral edema.   Gastrointestinal: Negative for abdominal pain, constipation, diarrhea, heartburn, hematochezia and nausea.   Breasts:  Negative for tenderness, breast mass and discharge.   Genitourinary: Negative for dysuria, frequency, genital sores, hematuria, pelvic pain, urgency, vaginal bleeding and vaginal discharge.   Musculoskeletal: Negative for arthralgias, joint swelling and myalgias.   Skin: Negative for rash.   Neurological: Positive for dizziness, headaches and paresthesias. Negative for weakness.   Psychiatric/Behavioral: Positive for mood changes. The patient is nervous/anxious.           OBJECTIVE:   /83   Pulse 92   Temp 98.1  F (36.7  C) (Oral)   Resp 16   Ht 1.651 m (5' 5\")   Wt 93 kg (205 lb)   LMP 05/03/2019   SpO2 99%   BMI 34.11 kg/m    Physical Exam  GENERAL: healthy, alert and no distress  EYES: Eyes grossly normal to inspection, PERRL and conjunctivae and sclerae normal  HENT: ear canals and TM's normal, nose and mouth without ulcers or lesions  NECK: no adenopathy, no asymmetry, masses, or scars and " "thyroid normal to palpation  RESP: lungs clear to auscultation - no rales, rhonchi or wheezes  BREAST: normal without masses, tenderness or nipple discharge and no palpable axillary masses or adenopathy  CV: regular rate and rhythm, normal S1 S2, no S3 or S4, no murmur, click or rub, no peripheral edema and peripheral pulses strong  ABDOMEN: soft, nontender, no hepatosplenomegaly, no masses and bowel sounds normal  MS: no gross musculoskeletal defects noted, no edema  SKIN: no suspicious lesions or rashes  NEURO: Normal strength and tone, mentation intact and speech normal  PSYCH: mentation appears normal, affect normal/bright    Diagnostic Test Results:  EKG: sinus rhythm. Mild atrial enlargement    ASSESSMENT/PLAN:   1. Routine general medical examination at a health care facility  Health maintenance reviewed and updated.    2. Palpitations  The following tests completed today.   Zio monitor placed in clinic.   Consider anxiety / panic as a cause, but since they have been occurring more around the time of her menstrual cycle and she has dysmenorrhea, I am going to have her do a trial of oral contraception. Follow up in a few months, she will continue to monitor her episodes.   - Comprehensive metabolic panel  - TSH with free T4 reflex  - EKG 12-lead complete w/read - Clinics  - Zio Patch Holter 24 hour Adult Pediatric; Future    3. Dysmenorrhea  See above.   - norgestim-eth estrad triphasic (ORTHO TRI-CYCLEN/TRI-SPRINTEC) 0.18/0.215/0.25 MG-35 MCG tablet; Take 1 tablet by mouth daily  Dispense: 84 tablet; Refill: 3    COUNSELING:  Reviewed preventive health counseling, as reflected in patient instructions       Regular exercise       Healthy diet/nutrition       Contraception    BP Readings from Last 1 Encounters:   05/08/19 135/83     Estimated body mass index is 34.11 kg/m  as calculated from the following:    Height as of this encounter: 1.651 m (5' 5\").    Weight as of this encounter: 93 kg (205 " lb).      Weight management plan: Discussed healthy diet and exercise guidelines     reports that she has never smoked. She has never used smokeless tobacco.      Counseling Resources:  ATP IV Guidelines  Pooled Cohorts Equation Calculator  Breast Cancer Risk Calculator  FRAX Risk Assessment  ICSI Preventive Guidelines  Dietary Guidelines for Americans, 2010  USDA's MyPlate  ASA Prophylaxis  Lung CA Screening    Kristen M. Kehr, PA-C  [facility]

## 2019-05-08 NOTE — NURSING NOTE
"Chief Complaint   Patient presents with     Physical       Initial /83   Pulse 92   Temp 98.1  F (36.7  C) (Oral)   Resp 16   Ht 1.651 m (5' 5\")   Wt 93 kg (205 lb)   LMP 05/03/2019   SpO2 99%   BMI 34.11 kg/m   Estimated body mass index is 34.11 kg/m  as calculated from the following:    Height as of this encounter: 1.651 m (5' 5\").    Weight as of this encounter: 93 kg (205 lb).  Medication Reconciliation: complete    AUDRA Vera MA    "

## 2019-05-09 ASSESSMENT — ANXIETY QUESTIONNAIRES: GAD7 TOTAL SCORE: 11

## 2019-06-03 ENCOUNTER — MYC MEDICAL ADVICE (OUTPATIENT)
Dept: FAMILY MEDICINE | Facility: CLINIC | Age: 44
End: 2019-06-03

## 2019-06-03 DIAGNOSIS — F41.9 ANXIETY: Primary | ICD-10-CM

## 2019-06-03 RX ORDER — SERTRALINE HYDROCHLORIDE 25 MG/1
25 TABLET, FILM COATED ORAL DAILY
Qty: 30 TABLET | Refills: 1 | Status: SHIPPED | OUTPATIENT
Start: 2019-06-03 | End: 2019-10-08

## 2019-06-03 NOTE — TELEPHONE ENCOUNTER
Per protocol, will route encounter to be cosigned by provider for Verbal Orders.  Tamiko Jarrell RN

## 2019-06-03 NOTE — TELEPHONE ENCOUNTER
PHQ-9 SCORE 9/25/2017 5/8/2019 6/3/2019   PHQ-9 Total Score - - -   PHQ-9 Total Score MyChart - - 6 (Mild depression)   PHQ-9 Total Score 0 3 6     MONTSERRAT-7 SCORE 9/25/2017 5/8/2019 6/3/2019   Total Score - - -   Total Score - - 9 (mild anxiety)   Total Score 1 11 9

## 2019-06-04 ENCOUNTER — ANCILLARY PROCEDURE (OUTPATIENT)
Dept: GENERAL RADIOLOGY | Facility: CLINIC | Age: 44
End: 2019-06-04
Attending: PHYSICIAN ASSISTANT
Payer: COMMERCIAL

## 2019-06-04 ENCOUNTER — OFFICE VISIT (OUTPATIENT)
Dept: URGENT CARE | Facility: URGENT CARE | Age: 44
End: 2019-06-04
Payer: COMMERCIAL

## 2019-06-04 VITALS
DIASTOLIC BLOOD PRESSURE: 99 MMHG | BODY MASS INDEX: 34.66 KG/M2 | OXYGEN SATURATION: 98 % | WEIGHT: 208 LBS | HEART RATE: 80 BPM | SYSTOLIC BLOOD PRESSURE: 156 MMHG | HEIGHT: 65 IN | TEMPERATURE: 98.6 F

## 2019-06-04 DIAGNOSIS — Z86.39 H/O TYPE 1 DIABETES MELLITUS: ICD-10-CM

## 2019-06-04 DIAGNOSIS — E10.9 TYPE 1 DIABETES MELLITUS WITHOUT COMPLICATION (H): ICD-10-CM

## 2019-06-04 DIAGNOSIS — E03.9 HYPOTHYROIDISM, UNSPECIFIED TYPE: ICD-10-CM

## 2019-06-04 DIAGNOSIS — S99.921A INJURY OF GREAT TOE, RIGHT, INITIAL ENCOUNTER: Primary | ICD-10-CM

## 2019-06-04 DIAGNOSIS — R03.0 ELEVATED BP WITHOUT DIAGNOSIS OF HYPERTENSION: ICD-10-CM

## 2019-06-04 DIAGNOSIS — E10.65 TYPE 1 DIABETES MELLITUS WITH HYPERGLYCEMIA (H): ICD-10-CM

## 2019-06-04 DIAGNOSIS — S99.921A INJURY OF GREAT TOE, RIGHT, INITIAL ENCOUNTER: ICD-10-CM

## 2019-06-04 PROCEDURE — 73660 X-RAY EXAM OF TOE(S): CPT | Mod: RT

## 2019-06-04 PROCEDURE — 99214 OFFICE O/P EST MOD 30 MIN: CPT | Performed by: PHYSICIAN ASSISTANT

## 2019-06-04 ASSESSMENT — MIFFLIN-ST. JEOR: SCORE: 1604.36

## 2019-06-04 NOTE — PROGRESS NOTES
"S: 42 yo female is here for r great toe injury. Dropped a folding chair on it yest. No fever. Diabetic. Mild tingling.  Tetanus 2015.       Past Medical History:   Diagnosis Date     Anxiety      DEPRESSIVE DISORDER NEC 2/8/2003     Type 1 diabetes mellitus without complication (H) 2/17/2016     Unspecified hypothyroidism 6/2003     History   Smoking Status     Never Smoker   Smokeless Tobacco     Never Used       ROS:  GEN no fevers  SKIN no erythema  Musculoskeletal:  As above  CONSTITUTIONAL: Negative for fatigue or fever.  EYES: Negative for eye problems.  ENT: neg.  RESP:neg.  CV: Negative for chest pains.  GI: Negative for vomiting.  NEUROLOGIC: Negative for headaches.  SKIN: Negative for rash.          OBJECTIVE:  Blood pressure (!) 156/99, pulse 80, temperature 98.6  F (37  C), temperature source Oral, height 5' 5\" (1.651 m), weight 208 lb (94.3 kg), SpO2 98 %, not currently breastfeeding.  Patient is alert and NAD.  EYES: conjunctiva clear  Ankle/foot Exam (right):  Inspection:contusion seen 1st MTP with some bruising. Mildly tender. FROM  Palpation:1st MTP  Cap refill intact.    Good doralis pedis.  Neurovascularly Intact Distally.     Study Result     XR TOE RIGHT G/E 2 VIEWS 6/4/2019 6:00 PM     COMPARISON: None.     HISTORY: RIGHT great toe injury.                                                                      IMPRESSION: No fractures are seen in the RIGHT great toe. Visualized  joints are preserved. No erosions.         ASSESSMENT:    ICD-10-CM    1. Injury of great toe, right, initial encounter S99.921A XR Toe Right G/E 2 Views   2. Elevated BP without diagnosis of hypertension R03.0    3. H/O type 1 diabetes mellitus Z86.39            PLAN:  Monitor BG and BP.  Ibu. Jorge A tape  Ice, elevate. RETURN TO CLINIC 1-2 weeks if not better  Jenn Escalante PA-C    "

## 2019-06-07 ENCOUNTER — MYC MEDICAL ADVICE (OUTPATIENT)
Dept: FAMILY MEDICINE | Facility: CLINIC | Age: 44
End: 2019-06-07

## 2019-06-09 NOTE — PROGRESS NOTES
Endocrinology Note         Brigitte is a 43 year old female presents today for type 1 diabetes.    HPI  Ms Brigitte Robles is a 43 years old female who is here for type 1 diabetes    She was noted to have increased blurred vision and fatigue in February 2016 and noted to have blood glucose of 300s and A1C of 7.0%. She was initially diagnosed with type 2 diabetes and was started on metformin 500 mg bid which did not control her blood glucose. She was then seen by her PCP who ran more blood test and noted for low c-peptide and positive MONTSERRAT ab and IA2 antibody. She was then started on insulin since.    Interval history:  She reports having episode of panic attack in April x2.  She stated that she felt anxious, nervous and could not sleep.  She was noted to have TSH of 0.07 and free T4 1.43.  Other labs were normal.  So, she reduced Synthroid from 112 mcgx6 days per week and 224 mcg x1 day per week to  Synthroid 112 mcg every day.  She feels some relief.  However, she has started on sertraline for anxiety.  She states that sertraline made she felt worse.  Symptoms include night sweats, anxiety, tremor, loss of appetite, dizziness and insomnia. Thus, she stopped sertraline last week.    1) type 1 diabetes: She reduced insulin for the past month because she could not eat well.  She is taking degludec (Tresiba) 30 units at night, Novolog 1 unit per 10 gram (normal week) and 1 unit per 8 gram (perimenstrual period week) and correction factor of 1 unit per 50 above 150 mg/dl. A1c is 7.2%. Reviewed glucose log and Dexcom, average  glucose 161 (SD 83). According to Dexcom, average glucose 178 (SD 61), Time in range 46%. Her pattern is postprandial hyperglycemia after lunch and dinner and nocturnal hyperglycemia.    She is not ready to go to insulin pump due to past experience. She will think about it. She did try insulin pump (Medtronic 630G) in 2017 but did not like it because her glucose seemed to be more fluctuated.    2)  "hypothyroidism: She has history of hypothyroidism for the past 11 years. It was diagnosed about 6 months postpartum. She is currently on Synthroid 112 mcg daily which she reduced from 112 mcgx6 days per week and 224 mcg x1 day per week for the past month due to above mentioned symptoms.     Past Medical History  Type 1 diabetes  Hypothyroidism    Allergies  Allergies   Allergen Reactions     Levothyroxine      Dizzy to generic --- not to synthroid     Medications  Current Outpatient Medications   Medication Sig Dispense Refill     blood glucose monitoring (ONETOUCH VERIO IQ) test strip Use to test blood sugar 10 times daily or as directed. 900 strip 3     Continuous Blood Gluc  (DEXCOM G6 ) MAGGY 1 Device continuous 1 Device 0     Continuous Blood Gluc Sensor (DEXCOM G6 SENSOR) MISC 1 each See Admin Instructions Change sensor every 10 days. 9 each 3     Continuous Blood Gluc Transmit (DEXCOM G6 TRANSMITTER) MISC 1 each every 3 months 1 each 3     glucagon (GLUCAGON EMERGENCY) 1 MG kit Inject 1 mg into the muscle once for 1 dose 1 mg 0     insulin aspart (NOVOLOG FLEXPEN) 100 UNIT/ML pen USE 1 UNIT PER 10 GM CARB WITH BREAKFAST AND LUNCH AND 1 UNIT PER 8 GM CARB W/DINNER -=50UNITS/DAY 45 mL 3     insulin degludec (TRESIBA) 200 UNIT/ML pen Inject 34 Units Subcutaneous daily 34 units daily - with additional \"during hormone times\" 18 mL 3     insulin pen needle (B-D U/F) 31G X 5 MM miscellaneous USE 4 DAILY OR AS DIRECTED. 400 each 1     norgestim-eth estrad triphasic (ORTHO TRI-CYCLEN/TRI-SPRINTEC) 0.18/0.215/0.25 MG-35 MCG tablet Take 1 tablet by mouth daily 84 tablet 3     ONETOUCH DELICA LANCETS 33G MISC 1 Box 6 times daily 100 each 12     sertraline (ZOLOFT) 25 MG tablet Take 1 tablet (25 mg) by mouth daily 30 tablet 1     SYNTHROID 112 MCG tablet TAKE 1 TABLET BY MOUTH DAILY FOR 6 DAYS A WEEK AND 2 TABLETS ONCE A WEEK 96 tablet 3     Family History  Type 1 diabetes in her father, mother, paternal " aunt, paternal uncle, brother and sister  Type 2 diabetes in her grandmother  Heart in her maternal grandfather and maternal grandmother  hypothyroid in her brother, father, maternal grandfather, maternal grandmother, mother, and sister.     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    ROS  Constitutional: loss of appetite, +panic attack  Eyes: no vision change, diplopia, no more blurred vision  Cardiovascular: no chest pain, palpitations  Respiratory: no dyspnea, cough, shortness of breath  GI: no nausea, vomiting, diarrhea or constipation, no abdominal pain   : no change in urine, no dysuria   Musculoskeletal: no joint swelling   Integumentary: occasional hive  Neuro: no numbness or tingling, + tremor, no headache   Endo: no polyuria or polydipsia, no temperature intolerance   Psych: +insomnia    Physical Exam  BP (!) 163/94 (BP Location: Left arm, Patient Position: Sitting, Cuff Size: Adult Large)   Pulse 86   Wt 91.1 kg (200 lb 12.8 oz)   SpO2 97%   BMI 33.41 kg/m    Body mass index is 33.41 kg/m .  Constitutional: no distress, comfortable, pleasant   Eyes: anicteric  Thyroid: no thyroid enlargement  CVS: RRR, normal S1,S2, no murmur  Lungs: CTA B/L  Abd: soft, NT, no hepatomegaly  Musculoskeletal: no edema   Skin: no jaundice   Neurological: normal gait, intact sensation per monofilament test bilaterally (exam 2/4/19), no tremor  Psychological: appropriate mood     RESULTS  Lab Results   Component Value Date    A1C 7.2 06/10/2019    A1C 7.5 02/04/2019    A1C 7.1 10/15/2018    A1C 6.8 06/04/2018    A1C 7.4 01/29/2018      Ref. Range 2/5/2016 11:41   Glutamic Acid Decarboxylase Antibody Unknown 45.7 (H)   IA-2 Antibody Unknown 3.4 (H)   Islet Cell Antibody IgG Unknown <1:4...      Ref. Range 2/10/2016 08:35   C-Peptide Latest Ref Range: 0.9-6.9 ng/mL 0.7 (L)     ENDO DIABETES Latest Ref Rng & Units 10/15/2018   LDL CHOLESTEROL DIRECT  <100 mg/dL 86     ENDO DIABETES Latest Ref Rng & Units 10/15/2018   ALBUMIN URINE MG/L mg/L <5   ALBUMIN URINE MG/G CR 0 - 25 mg/g Cr Unable to calculate due to low value   CREATININE 0.52 - 1.04 mg/dL 0.73         ASSESSMENT:    Ms Brigitte Robles is a 43 years old female who is here for follow up type 1 diabetes    1) type 1 diabetes: diagnosed type 1 diabetes in Feb 2016. A1c is 7.2% but has postprandial hyperglycemia and nocturnal hyperglycemia  - continue Tresiba 30 units daily  - Novolog 1 unit per 10 gram (normal week) and 1 unit per 8 gram (perimenstrual period week)   - correction factor of 1 unit per 50 above 150 mg/dl.  - continue to check blood glucose 3-4 times per day   - she is not ready for insulin pump    2) Hypothyroidism:clinically hyperthyroid -- anxiety, panic attack, nervousness, insomnia. TSh was 0.07 and FT4 was 1.43 on 5/8/2019. She reduced Synthroid to 112 mcg daily over the past month. Repeat lab today.     3) Obesity: BMI 34. Encourage to continue on healthy diet and exercise.    PLAN:   - TFT today  - Continue insulin degludec (Tresiba) 30 units daily  - Novolog 1 unit per 10 gram (normal week) and 1 unit per 8 gram (perimenstrual period week)   - continue correction factor 1 unit per 50 above 150 mg/dl  - encourage her to reconsider insulin pump -- she is not ready at this time  - RTC 4 months     Lloyd Ledesma MD  Endocrinology  [phone]

## 2019-06-10 ENCOUNTER — OFFICE VISIT (OUTPATIENT)
Dept: ENDOCRINOLOGY | Facility: CLINIC | Age: 44
End: 2019-06-10
Payer: COMMERCIAL

## 2019-06-10 VITALS
HEART RATE: 86 BPM | DIASTOLIC BLOOD PRESSURE: 94 MMHG | OXYGEN SATURATION: 97 % | WEIGHT: 200.8 LBS | SYSTOLIC BLOOD PRESSURE: 163 MMHG | BODY MASS INDEX: 33.41 KG/M2

## 2019-06-10 DIAGNOSIS — E10.65 TYPE 1 DIABETES MELLITUS WITH HYPERGLYCEMIA (H): Primary | ICD-10-CM

## 2019-06-10 DIAGNOSIS — E03.9 HYPOTHYROIDISM, UNSPECIFIED TYPE: ICD-10-CM

## 2019-06-10 LAB
HBA1C MFR BLD: 7.2 % (ref 0–5.6)
T4 FREE SERPL-MCNC: 1.32 NG/DL (ref 0.76–1.46)
TSH SERPL DL<=0.005 MIU/L-ACNC: 7.22 MU/L (ref 0.4–4)

## 2019-06-10 PROCEDURE — 83036 HEMOGLOBIN GLYCOSYLATED A1C: CPT | Performed by: INTERNAL MEDICINE

## 2019-06-10 PROCEDURE — 99214 OFFICE O/P EST MOD 30 MIN: CPT | Performed by: INTERNAL MEDICINE

## 2019-06-10 PROCEDURE — 84439 ASSAY OF FREE THYROXINE: CPT | Performed by: INTERNAL MEDICINE

## 2019-06-10 PROCEDURE — 86376 MICROSOMAL ANTIBODY EACH: CPT | Performed by: INTERNAL MEDICINE

## 2019-06-10 PROCEDURE — 99000 SPECIMEN HANDLING OFFICE-LAB: CPT | Performed by: INTERNAL MEDICINE

## 2019-06-10 PROCEDURE — 36415 COLL VENOUS BLD VENIPUNCTURE: CPT | Performed by: INTERNAL MEDICINE

## 2019-06-10 PROCEDURE — 84445 ASSAY OF TSI GLOBULIN: CPT | Mod: 90 | Performed by: INTERNAL MEDICINE

## 2019-06-10 PROCEDURE — 84443 ASSAY THYROID STIM HORMONE: CPT | Performed by: INTERNAL MEDICINE

## 2019-06-10 NOTE — LETTER
6/10/2019         RE: Brigitte Robles  [address] [facility] [address]        Dear Colleague,    Thank you for referring your patient, Brigitte Robles, to the [facility]. Please see a copy of my visit note below.         Endocrinology Note         Brigitte is a 43 year old female presents today for type 1 diabetes.    HPI  Ms Brigitte Robles is a 43 years old female who is here for type 1 diabetes    She was noted to have increased blurred vision and fatigue in February 2016 and noted to have blood glucose of 300s and A1C of 7.0%. She was initially diagnosed with type 2 diabetes and was started on metformin 500 mg bid which did not control her blood glucose. She was then seen by her PCP who ran more blood test and noted for low c-peptide and positive MONTSERRAT ab and IA2 antibody. She was then started on insulin since.    Interval history:  She reports having episode of panic attack in April x2.  She stated that she felt anxious, nervous and could not sleep.  She was noted to have TSH of 0.07 and free T4 1.43.  Other labs were normal.  So, she reduced Synthroid from 112 mcgx6 days per week and 224 mcg x1 day per week to  Synthroid 112 mcg every day.  She feels some relief.  However, she has started on sertraline for anxiety.  She states that sertraline made she felt worse.  Symptoms include night sweats, anxiety, tremor, loss of appetite, dizziness and insomnia. Thus, she stopped sertraline last week.    1) type 1 diabetes: She reduced insulin for the past month because she could not eat well.  She is taking degludec (Tresiba) 30 units at night, Novolog 1 unit per 10 gram (normal week) and 1 unit per 8 gram (perimenstrual period week) and correction factor of 1 unit per 50 above 150 mg/dl. A1c is 7.2%. Reviewed glucose log and Dexcom, average  glucose 161 (SD 83). According to Dexcom, average glucose 178 (SD 61), Time in range 46%. Her pattern is postprandial hyperglycemia after  Patient transported to floor. Fax confirmation received of [facility] being sent. Patient belongings transported with patient. No bleeding noted during stay in ER. Denied any acute pain during stay in er. Patient remained NPO while here due to GI bleed dx. Patient denies needs or wants. Given warm blankets and watching tv. Patient suprapubic bag changed upon arrival here due to previous bag leaking and arrived wrapped in clear plastic bags. Patient stated it has been like that for a few days. This rn disconnected at rubber cath and placed new tubing and bag on patient. + return of urine when bag connected. Stable to transfer of care to floor.       Leslie Cassidy RN  03/07/18 4660 "lunch and dinner and nocturnal hyperglycemia.    She is not ready to go to insulin pump due to past experience. She will think about it. She did try insulin pump (Medtronic 630G) in 2017 but did not like it because her glucose seemed to be more fluctuated.    2) hypothyroidism: She has history of hypothyroidism for the past 11 years. It was diagnosed about 6 months postpartum. She is currently on Synthroid 112 mcg daily which she reduced from 112 mcgx6 days per week and 224 mcg x1 day per week for the past month due to above mentioned symptoms.     Past Medical History  Type 1 diabetes  Hypothyroidism    Allergies  Allergies   Allergen Reactions     Levothyroxine      Dizzy to generic --- not to synthroid     Medications  Current Outpatient Medications   Medication Sig Dispense Refill     blood glucose monitoring (ONETOUCH VERIO IQ) test strip Use to test blood sugar 10 times daily or as directed. 900 strip 3     Continuous Blood Gluc  (DEXCOM G6 ) MAGGY 1 Device continuous 1 Device 0     Continuous Blood Gluc Sensor (DEXCOM G6 SENSOR) MISC 1 each See Admin Instructions Change sensor every 10 days. 9 each 3     Continuous Blood Gluc Transmit (DEXCOM G6 TRANSMITTER) MISC 1 each every 3 months 1 each 3     glucagon (GLUCAGON EMERGENCY) 1 MG kit Inject 1 mg into the muscle once for 1 dose 1 mg 0     insulin aspart (NOVOLOG FLEXPEN) 100 UNIT/ML pen USE 1 UNIT PER 10 GM CARB WITH BREAKFAST AND LUNCH AND 1 UNIT PER 8 GM CARB W/DINNER -=50UNITS/DAY 45 mL 3     insulin degludec (TRESIBA) 200 UNIT/ML pen Inject 34 Units Subcutaneous daily 34 units daily - with additional \"during hormone times\" 18 mL 3     insulin pen needle (B-D U/F) 31G X 5 MM miscellaneous USE 4 DAILY OR AS DIRECTED. 400 each 1     norgestim-eth estrad triphasic (ORTHO TRI-CYCLEN/TRI-SPRINTEC) 0.18/0.215/0.25 MG-35 MCG tablet Take 1 tablet by mouth daily 84 tablet 3     ONETOUCH DELICA LANCETS 33G MISC 1 Box 6 times daily 100 each 12     " sertraline (ZOLOFT) 25 MG tablet Take 1 tablet (25 mg) by mouth daily 30 tablet 1     SYNTHROID 112 MCG tablet TAKE 1 TABLET BY MOUTH DAILY FOR 6 DAYS A WEEK AND 2 TABLETS ONCE A WEEK 96 tablet 3     Family History  Type 1 diabetes in her father, mother, paternal aunt, paternal uncle, brother and sister  Type 2 diabetes in her grandmother  Heart in her maternal grandfather and maternal grandmother  hypothyroid in her brother, father, maternal grandfather, maternal grandmother, mother, and sister.     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    ROS  Constitutional: loss of appetite, +panic attack  Eyes: no vision change, diplopia, no more blurred vision  Cardiovascular: no chest pain, palpitations  Respiratory: no dyspnea, cough, shortness of breath  GI: no nausea, vomiting, diarrhea or constipation, no abdominal pain   : no change in urine, no dysuria   Musculoskeletal: no joint swelling   Integumentary: occasional hive  Neuro: no numbness or tingling, + tremor, no headache   Endo: no polyuria or polydipsia, no temperature intolerance   Psych: +insomnia    Physical Exam  BP (!) 163/94 (BP Location: Left arm, Patient Position: Sitting, Cuff Size: Adult Large)   Pulse 86   Wt 91.1 kg (200 lb 12.8 oz)   SpO2 97%   BMI 33.41 kg/m     Body mass index is 33.41 kg/m .  Constitutional: no distress, comfortable, pleasant   Eyes: anicteric  Thyroid: no thyroid enlargement  CVS: RRR, normal S1,S2, no murmur  Lungs: CTA B/L  Abd: soft, NT, no hepatomegaly  Musculoskeletal: no edema   Skin: no jaundice   Neurological: normal gait, intact sensation per monofilament test bilaterally (exam 2/4/19), no tremor  Psychological: appropriate mood     RESULTS  Lab Results   Component Value Date    A1C 7.2 06/10/2019    A1C 7.5 02/04/2019    A1C 7.1 10/15/2018    A1C 6.8 06/04/2018    A1C 7.4 01/29/2018      Ref. Range 2/5/2016 11:41   Glutamic Acid  Decarboxylase Antibody Unknown 45.7 (H)   IA-2 Antibody Unknown 3.4 (H)   Islet Cell Antibody IgG Unknown <1:4...      Ref. Range 2/10/2016 08:35   C-Peptide Latest Ref Range: 0.9-6.9 ng/mL 0.7 (L)     ENDO DIABETES Latest Ref Rng & Units 10/15/2018   LDL CHOLESTEROL DIRECT <100 mg/dL 86     ENDO DIABETES Latest Ref Rng & Units 10/15/2018   ALBUMIN URINE MG/L mg/L <5   ALBUMIN URINE MG/G CR 0 - 25 mg/g Cr Unable to calculate due to low value   CREATININE 0.52 - 1.04 mg/dL 0.73         ASSESSMENT:    Ms Brigitte Robles is a 43 years old female who is here for follow up type 1 diabetes    1) type 1 diabetes: diagnosed type 1 diabetes in Feb 2016. A1c is 7.2% but has postprandial hyperglycemia and nocturnal hyperglycemia  - continue Tresiba 30 units daily  - Novolog 1 unit per 10 gram (normal week) and 1 unit per 8 gram (perimenstrual period week)   - correction factor of 1 unit per 50 above 150 mg/dl.  - continue to check blood glucose 3-4 times per day   - she is not ready for insulin pump    2) Hypothyroidism:clinically hyperthyroid -- anxiety, panic attack, nervousness, insomnia. TSh was 0.07 and FT4 was 1.43 on 5/8/2019. She reduced Synthroid to 112 mcg daily over the past month. Repeat lab today.     3) Obesity: BMI 34. Encourage to continue on healthy diet and exercise.    PLAN:   - TFT today  - Continue insulin degludec (Tresiba) 30 units daily  - Novolog 1 unit per 10 gram (normal week) and 1 unit per 8 gram (perimenstrual period week)   - continue correction factor 1 unit per 50 above 150 mg/dl  - encourage her to reconsider insulin pump -- she is not ready at this time  - RTC 4 months     Lloyd Ledesma MD  Endocrinology  [phone]

## 2019-06-10 NOTE — NURSING NOTE
Brigitte Robles's goals for this visit include:   Chief Complaint   Patient presents with     Thyroid Disease     Diabetes     She requests these members of her care team be copied on today's visit information: Yes    PCP: Kehr, Kristen M    Referring Provider:  Kristen M Kehr, PA-C  [address] [place] [address]    BP (!) 163/94 (BP Location: Left arm, Patient Position: Sitting, Cuff Size: Adult Large)   Pulse 86   Wt 91.1 kg (200 lb 12.8 oz)   SpO2 97%   BMI 33.41 kg/m      Do you need any medication refills at today's visit? No

## 2019-06-11 DIAGNOSIS — E06.3 HYPOTHYROIDISM DUE TO HASHIMOTO'S THYROIDITIS: Primary | ICD-10-CM

## 2019-06-11 LAB — THYROPEROXIDASE AB SERPL-ACNC: 322 IU/ML

## 2019-06-11 RX ORDER — LEVOTHYROXINE SODIUM 125 MCG
125 TABLET ORAL DAILY
Qty: 90 TABLET | Refills: 3 | Status: SHIPPED | OUTPATIENT
Start: 2019-06-11 | End: 2020-05-15

## 2019-06-14 LAB — TSI SER-ACNC: <1 TSI INDEX

## 2019-06-15 ENCOUNTER — TRANSFERRED RECORDS (OUTPATIENT)
Dept: HEALTH INFORMATION MANAGEMENT | Facility: CLINIC | Age: 44
End: 2019-06-15

## 2019-06-17 ENCOUNTER — TELEPHONE (OUTPATIENT)
Dept: FAMILY MEDICINE | Facility: CLINIC | Age: 44
End: 2019-06-17

## 2019-06-26 DIAGNOSIS — F41.9 ANXIETY: ICD-10-CM

## 2019-06-27 RX ORDER — SERTRALINE HYDROCHLORIDE 25 MG/1
TABLET, FILM COATED ORAL
Qty: 30 TABLET | Refills: 1 | OUTPATIENT
Start: 2019-06-27

## 2019-07-11 ENCOUNTER — TELEPHONE (OUTPATIENT)
Dept: ENDOCRINOLOGY | Facility: CLINIC | Age: 44
End: 2019-07-11

## 2019-07-11 DIAGNOSIS — E06.3 HYPOTHYROIDISM DUE TO HASHIMOTO'S THYROIDITIS: ICD-10-CM

## 2019-07-11 DIAGNOSIS — E06.3 HYPOTHYROIDISM DUE TO HASHIMOTO'S THYROIDITIS: Primary | ICD-10-CM

## 2019-07-11 PROCEDURE — 84439 ASSAY OF FREE THYROXINE: CPT | Performed by: INTERNAL MEDICINE

## 2019-07-11 PROCEDURE — 84443 ASSAY THYROID STIM HORMONE: CPT | Performed by: INTERNAL MEDICINE

## 2019-07-11 PROCEDURE — 80048 BASIC METABOLIC PNL TOTAL CA: CPT | Performed by: INTERNAL MEDICINE

## 2019-07-11 PROCEDURE — 36415 COLL VENOUS BLD VENIPUNCTURE: CPT | Performed by: INTERNAL MEDICINE

## 2019-07-11 NOTE — TELEPHONE ENCOUNTER
Per Dr. Ledesma Message:  Let repeat TSH, FT4, cortisol and BMP. Orders are in.     Thanks,   Lloyd    Left detailed message with recommendations. Encouraged to return clinic call with questions or the main number to schedule lab appt.    Cora Amos LPN  Diabetes Clinic Coordinator   Adult Endocrinology and Pediatric Specialty Clinics  [facility]

## 2019-07-11 NOTE — TELEPHONE ENCOUNTER
"Patient came to clinic to discuss symptoms of nausea, dizziness, fatigue, and depression/teary that started on Monday. She denies any vomiting and has not noticed significant weight loss. 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 Reports blood sugars have been \"normal.\" Denies low blood pressure. Patient did not appear to have dark skin. She is questioning if she should go to ED or Urgent Care for evaluation. Writer advised that if she felt symptoms were that severe that she should be seen.     Will forward to Dr. Ledesma to see if follow up on ACTH Stim test appropriate.    Cora Amos LPN  Diabetes Clinic Coordinator   Adult Endocrinology and Pediatric Specialty Clinics  [facility]        "

## 2019-07-12 LAB
ANION GAP SERPL CALCULATED.3IONS-SCNC: 8 MMOL/L (ref 3–14)
BUN SERPL-MCNC: 9 MG/DL (ref 7–30)
CALCIUM SERPL-MCNC: 9.2 MG/DL (ref 8.5–10.1)
CHLORIDE SERPL-SCNC: 106 MMOL/L (ref 94–109)
CO2 SERPL-SCNC: 25 MMOL/L (ref 20–32)
CREAT SERPL-MCNC: 0.69 MG/DL (ref 0.52–1.04)
GFR SERPL CREATININE-BSD FRML MDRD: >90 ML/MIN/{1.73_M2}
GLUCOSE SERPL-MCNC: 117 MG/DL (ref 70–99)
POTASSIUM SERPL-SCNC: 3.5 MMOL/L (ref 3.4–5.3)
SODIUM SERPL-SCNC: 139 MMOL/L (ref 133–144)
T4 FREE SERPL-MCNC: 1.43 NG/DL (ref 0.76–1.46)
TSH SERPL DL<=0.005 MIU/L-ACNC: 1.78 MU/L (ref 0.4–4)

## 2019-08-22 ENCOUNTER — E-VISIT (OUTPATIENT)
Dept: FAMILY MEDICINE | Facility: CLINIC | Age: 44
End: 2019-08-22
Payer: COMMERCIAL

## 2019-08-22 DIAGNOSIS — F32.0 MILD MAJOR DEPRESSION (H): ICD-10-CM

## 2019-08-22 DIAGNOSIS — F41.1 GAD (GENERALIZED ANXIETY DISORDER): Primary | ICD-10-CM

## 2019-08-22 PROCEDURE — 99444 ZZC PHYSICIAN ONLINE EVALUATION & MANAGEMENT SERVICE: CPT | Performed by: PHYSICIAN ASSISTANT

## 2019-08-22 RX ORDER — CITALOPRAM HYDROBROMIDE 20 MG/1
TABLET ORAL
Qty: 30 TABLET | Refills: 1 | Status: SHIPPED | OUTPATIENT
Start: 2019-08-22 | End: 2019-09-17

## 2019-08-22 ASSESSMENT — ANXIETY QUESTIONNAIRES
GAD7 TOTAL SCORE: 7
GAD7 TOTAL SCORE: 7
2. NOT BEING ABLE TO STOP OR CONTROL WORRYING: SEVERAL DAYS
6. BECOMING EASILY ANNOYED OR IRRITABLE: SEVERAL DAYS
7. FEELING AFRAID AS IF SOMETHING AWFUL MIGHT HAPPEN: SEVERAL DAYS
7. FEELING AFRAID AS IF SOMETHING AWFUL MIGHT HAPPEN: SEVERAL DAYS
3. WORRYING TOO MUCH ABOUT DIFFERENT THINGS: SEVERAL DAYS
GAD7 TOTAL SCORE: 7
5. BEING SO RESTLESS THAT IT IS HARD TO SIT STILL: SEVERAL DAYS
4. TROUBLE RELAXING: SEVERAL DAYS
1. FEELING NERVOUS, ANXIOUS, OR ON EDGE: SEVERAL DAYS

## 2019-08-22 ASSESSMENT — PATIENT HEALTH QUESTIONNAIRE - PHQ9
SUM OF ALL RESPONSES TO PHQ QUESTIONS 1-9: 25
SUM OF ALL RESPONSES TO PHQ QUESTIONS 1-9: 25
10. IF YOU CHECKED OFF ANY PROBLEMS, HOW DIFFICULT HAVE THESE PROBLEMS MADE IT FOR YOU TO DO YOUR WORK, TAKE CARE OF THINGS AT HOME, OR GET ALONG WITH OTHER PEOPLE: EXTREMELY DIFFICULT

## 2019-08-23 ASSESSMENT — PATIENT HEALTH QUESTIONNAIRE - PHQ9: SUM OF ALL RESPONSES TO PHQ QUESTIONS 1-9: 25

## 2019-08-23 ASSESSMENT — ANXIETY QUESTIONNAIRES: GAD7 TOTAL SCORE: 7

## 2019-08-26 DIAGNOSIS — E10.9 TYPE 1 DIABETES MELLITUS WITHOUT COMPLICATION (H): ICD-10-CM

## 2019-08-26 RX ORDER — INSULIN DEGLUDEC 200 U/ML
INJECTION, SOLUTION SUBCUTANEOUS
Qty: 15 ML | Refills: 3 | Status: SHIPPED | OUTPATIENT
Start: 2019-08-26 | End: 2020-02-10

## 2019-09-17 DIAGNOSIS — F32.0 MILD MAJOR DEPRESSION (H): ICD-10-CM

## 2019-09-17 DIAGNOSIS — F41.1 GAD (GENERALIZED ANXIETY DISORDER): ICD-10-CM

## 2019-09-17 RX ORDER — CITALOPRAM HYDROBROMIDE 20 MG/1
TABLET ORAL
Qty: 30 TABLET | Refills: 0 | Status: SHIPPED | OUTPATIENT
Start: 2019-09-17 | End: 2019-10-08

## 2019-09-17 NOTE — TELEPHONE ENCOUNTER
30 days of the prescription refilled.   Please have her schedule an appointment for recheck in the clinic. If she is having difficulty with the office visit for scheduling, then have her start an evisit. This will be for follow up after starting a new medication.   Kristen Kehr PA-C

## 2019-09-17 NOTE — LETTER
September 17, 2019    Brigitte Robles  [address] [place] [id number]    Dear Brigitte,       We recently received a refill request for Citalopram (CELEXA).  We have refilled this for a one time 30 day supply only because you are due for a:    Depression Follow up office visit      Please call at your earliest convenience so that there will not be a delay with your future refills.          Thank you,   Your [facility] Team/mkr  [phone]

## 2019-09-17 NOTE — TELEPHONE ENCOUNTER
Patient is due for follow up on depression this week.   No appointment pending at this time.  Routing to provider to advise.    Hanny HANSONN, RN

## 2019-09-23 ENCOUNTER — TELEPHONE (OUTPATIENT)
Dept: FAMILY MEDICINE | Facility: CLINIC | Age: 44
End: 2019-09-23

## 2019-09-23 DIAGNOSIS — E10.65 TYPE 1 DIABETES MELLITUS WITH HYPERGLYCEMIA (H): ICD-10-CM

## 2019-09-23 NOTE — TELEPHONE ENCOUNTER
"Pharmacy comments:    \"Please send 90 day plus refills for insurance for BD UF mini pen needle [id number]. Thank you.\"  "

## 2019-10-07 ENCOUNTER — OFFICE VISIT (OUTPATIENT)
Dept: ENDOCRINOLOGY | Facility: CLINIC | Age: 44
End: 2019-10-07
Payer: COMMERCIAL

## 2019-10-07 VITALS
DIASTOLIC BLOOD PRESSURE: 89 MMHG | BODY MASS INDEX: 32.67 KG/M2 | OXYGEN SATURATION: 96 % | SYSTOLIC BLOOD PRESSURE: 137 MMHG | HEART RATE: 87 BPM | WEIGHT: 196.3 LBS

## 2019-10-07 DIAGNOSIS — E10.65 TYPE 1 DIABETES MELLITUS WITH HYPERGLYCEMIA (H): ICD-10-CM

## 2019-10-07 DIAGNOSIS — E03.9 HYPOTHYROIDISM, UNSPECIFIED TYPE: Primary | ICD-10-CM

## 2019-10-07 LAB — HBA1C MFR BLD: 7.4 % (ref 0–5.6)

## 2019-10-07 PROCEDURE — 83036 HEMOGLOBIN GLYCOSYLATED A1C: CPT | Performed by: INTERNAL MEDICINE

## 2019-10-07 PROCEDURE — 36415 COLL VENOUS BLD VENIPUNCTURE: CPT | Performed by: INTERNAL MEDICINE

## 2019-10-07 PROCEDURE — 99214 OFFICE O/P EST MOD 30 MIN: CPT | Performed by: INTERNAL MEDICINE

## 2019-10-07 NOTE — PATIENT INSTRUCTIONS
[facility]  Fatou Arora RN, Diabetes Educator: [phone]  Cora Amos LPN Diabetes Clinic Coordinator: [phone]  Clinic Line:[phone]  Clinic Fax: [phone]  On-Call Endocrine Provider at the [facility] (after hours/weekends): [phone] option 4  Scheduling Line: [phone]    Appointment Reminders:  * Please bring meter and/or CGM device with for staff to download  * If you are due ONLY for an A1C, it is scheduled with the nurse and will be done in clinic. You do not need to schedule a lab appointment. Fasting is not required for an A1C.  * Refill request should be submitted to your pharmacy. They will contact clinic for approval.

## 2019-10-07 NOTE — PROGRESS NOTES
Endocrinology Note         Brigitte is a 43 year old female presents today for type 1 diabetes.    HPI  Ms Brigitte Robles is a 43 years old female who is here for type 1 diabetes    She was noted to have increased blurred vision and fatigue in February 2016 and noted to have blood glucose of 300s and A1C of 7.0%. She was initially diagnosed with type 2 diabetes and was started on metformin 500 mg bid which did not control her blood glucose. She was then seen by her PCP who ran more blood test and noted for low c-peptide and positive MONTSERRAT ab and IA2 antibody. She was then started on insulin since.    Interval history:  She said she did not do well in the summer because of nausea, fatigue and dizziness. She finally got better after being on gluten free diet and new antidepressant (sertraline). Her thyroid level was also off during summer. In May2019, TSH of 0.07 and free T4 1.43 after she did have episode of panic attack in April twice. Her Synthroid dose was adjusted to 112 mcg daily. However, repeated in June 2019 showed TSH 7.22, FT4 1.32,  and negative TSI. Synthroid dose was then increased to 125 mcg daily. She started on gluten free diet in August. Since then, she has felt much better. She has not had hive in the neck and arm anymore. She has not had nausea and faitgue anymore. She feels 99% better.    1) type 1 diabetes: because of symptoms above, her glucoses have been higher particularly after meal. She is taking degludec (Tresiba) 30 units at night, Novolog 1 unit per 10 gram (normal week) and 1 unit per 8 gram (perimenstrual period week) and correction factor of 1 unit per 50 above 150 mg/dl. A1c is 7.4%. Reviewed Dexcom, average  glucose 192 (SD69). Time in range 58%. Her pattern is postprandial hyperglycemia after lunch and dinner. She has not had much of hypoglycemia anymore.    She has been thinking about insulin pump -- Tandem.  She did try insulin pump (Medtronic 630G) in 2017 but did not like  it because her glucose seemed to be more fluctuated.    2) hypothyroidism: She has history of hypothyroidism for the past 11 years. It was diagnosed about 6 months postpartum. She is currently on Synthroid 125 mcg daily which was adjusted after the lab and above mentioned symptoms.     Past Medical History  Type 1 diabetes  Hypothyroidism    Allergies  Allergies   Allergen Reactions     Levothyroxine      Dizzy to generic --- not to synthroid     Medications  Current Outpatient Medications   Medication Sig Dispense Refill     citalopram (CELEXA) 20 MG tablet 1 tablet daily 30 tablet 0     Continuous Blood Gluc  (DEXCOM G6 ) MAGGY 1 Device continuous 1 Device 0     Continuous Blood Gluc Sensor (DEXCOM G6 SENSOR) MISC 1 each See Admin Instructions Change sensor every 10 days. 9 each 3     Continuous Blood Gluc Transmit (DEXCOM G6 TRANSMITTER) MISC 1 each every 3 months 1 each 3     insulin aspart (NOVOLOG FLEXPEN) 100 UNIT/ML pen USE 1 UNIT PER 10 GM CARB WITH BREAKFAST AND LUNCH AND 1 UNIT PER 8 GM CARB W/DINNER -=50UNITS/DAY 45 mL 3     insulin pen needle (B-D U/F) 31G X 5 MM miscellaneous USE 4 DAILY OR AS DIRECTED. 400 each 3     sertraline (ZOLOFT) 25 MG tablet Take 1 tablet (25 mg) by mouth daily 30 tablet 1     SYNTHROID 125 MCG tablet Take 1 tablet (125 mcg) by mouth daily 90 tablet 3     TRESIBA FLEXTOUCH 200 UNIT/ML pen INJECT 30 UNITS SUBCUTANEOUS DAILY 15 mL 3     blood glucose monitoring (ONETOUCH VERIO IQ) test strip Use to test blood sugar 10 times daily or as directed. (Patient not taking: Reported on 10/7/2019) 900 strip 3     glucagon (GLUCAGON EMERGENCY) 1 MG kit Inject 1 mg into the muscle once for 1 dose 1 mg 0     norgestim-eth estrad triphasic (ORTHO TRI-CYCLEN/TRI-SPRINTEC) 0.18/0.215/0.25 MG-35 MCG tablet Take 1 tablet by mouth daily 84 tablet 3     ONETOUCH DELICA LANCETS 33G MISC 1 Box 6 times daily (Patient not taking: Reported on 10/7/2019) 100 each 12     Family  History  Type 1 diabetes in her father, mother, paternal aunt, paternal uncle, brother and sister  Type 2 diabetes in her grandmother  Heart in her maternal grandfather and maternal grandmother  hypothyroid in her brother, father, maternal grandfather, maternal grandmother, mother, and sister.     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    ROS  Constitutional: good appetite, no more panic attack  Eyes: no vision change, diplopia, no more blurred vision  Cardiovascular: no chest pain, palpitations  Respiratory: no dyspnea, cough, shortness of breath  GI: no nausea, vomiting, diarrhea or constipation, no abdominal pain   : no change in urine, no dysuria   Musculoskeletal: no joint swelling   Integumentary: occasional hive in the neck and arm  Neuro: no numbness or tingling, no tremor, no headache   Endo: no polyuria or polydipsia, no temperature intolerance   Psych: sleep well    Physical Exam  /89 (BP Location: Left arm, Patient Position: Sitting, Cuff Size: Adult Large)   Pulse 87   Wt 89 kg (196 lb 4.8 oz)   SpO2 96%   BMI 32.67 kg/m    Body mass index is 32.67 kg/m .  Constitutional: no distress, comfortable, pleasant   Eyes: anicteric  Musculoskeletal: no edema   Skin: no jaundice   Neurological: normal gait, intact sensation per monofilament test bilaterally (exam 2/4/19), no tremor  Psychological: appropriate mood     RESULTS    Lab Results   Component Value Date    A1C 7.4 10/07/2019    A1C 7.2 06/10/2019    A1C 7.5 02/04/2019    A1C 7.1 10/15/2018    A1C 6.8 06/04/2018      Ref. Range 2/5/2016 11:41   Glutamic Acid Decarboxylase Antibody Unknown 45.7 (H)   IA-2 Antibody Unknown 3.4 (H)   Islet Cell Antibody IgG Unknown <1:4...      Ref. Range 2/10/2016 08:35   C-Peptide Latest Ref Range: 0.9-6.9 ng/mL 0.7 (L)     ENDO DIABETES Latest Ref Rng & Units 10/15/2018   LDL CHOLESTEROL DIRECT <100 mg/dL 86     ENDO DIABETES Latest  Ref Rng & Units 10/15/2018   ALBUMIN URINE MG/L mg/L <5   ALBUMIN URINE MG/G CR 0 - 25 mg/g Cr Unable to calculate due to low value   CREATININE 0.52 - 1.04 mg/dL 0.73         ASSESSMENT:    Ms Brigitte Robles is a 43 years old female who is here for follow up type 1 diabetes    1) type 1 diabetes: diagnosed type 1 diabetes in Feb 2016. A1c is 7.4% but has postprandial hyperglycemia and nocturnal hyperglycemia  - continue Tresiba 30 units daily  - increase Novolog 1 unit per 8 gram  - correction factor of 1 unit per 50 above 150 mg/dl.  - continue to check blood glucose 3-4 times per day   - she is considering Tandem T slim pump but is not yet ready    2) Hypothyroidism:clinically euthyroid. Lab returned normal in July. Continue Synthroid 125 mcg daily. Lab today    3) Obesity: BMI 34. Encourage to continue on healthy diet and exercise.    PLAN:   - TFT, AM random cortisol, lipid panel, urine microalbumin, tissue transglutaminase Ab  - Continue insulin degludec (Tresiba) 30 units daily  - increase Novolog 1 unit per 8 gram   - continue correction factor 1 unit per 50 above 150 mg/dl  - RTC 4 months     Lloyd Ledesma MD  Endocrinology  [phone]

## 2019-10-07 NOTE — NURSING NOTE
Brigitte Robles's goals for this visit include:   Chief Complaint   Patient presents with     Diabetes     She requests these members of her care team be copied on today's visit information: Yes    PCP: Kehr, Kristen M    Referring Provider:  Kristen M Kehr, PA-C  [address] [place] [address]    /89 (BP Location: Left arm, Patient Position: Sitting, Cuff Size: Adult Large)   Pulse 87   Wt 89 kg (196 lb 4.8 oz)   SpO2 96%   BMI 32.67 kg/m      Do you need any medication refills at today's visit? No

## 2019-10-07 NOTE — LETTER
10/7/2019         RE: Brigitte Robles  [address] [facility] [address]        Dear Colleague,    Thank you for referring your patient, Brigitte Robles, to the [facility]. Please see a copy of my visit note below.         Endocrinology Note         Brigitte is a 43 year old female presents today for type 1 diabetes.    HPI  Ms Brigitte Robles is a 43 years old female who is here for type 1 diabetes    She was noted to have increased blurred vision and fatigue in February 2016 and noted to have blood glucose of 300s and A1C of 7.0%. She was initially diagnosed with type 2 diabetes and was started on metformin 500 mg bid which did not control her blood glucose. She was then seen by her PCP who ran more blood test and noted for low c-peptide and positive MONTSERRAT ab and IA2 antibody. She was then started on insulin since.    Interval history:  She said she did not do well in the summer because of nausea, fatigue and dizziness. She finally got better after being on gluten free diet and new antidepressant (sertraline). Her thyroid level was also off during summer. In May2019, TSH of 0.07 and free T4 1.43 after she did have episode of panic attack in April twice. Her Synthroid dose was adjusted to 112 mcg daily. However, repeated in June 2019 showed TSH 7.22, FT4 1.32,  and negative TSI. Synthroid dose was then increased to 125 mcg daily. She started on gluten free diet in August. Since then, she has felt much better. She has not had hive in the neck and arm anymore. She has not had nausea and faitgue anymore. She feels 99% better.    1) type 1 diabetes: because of symptoms above, her glucoses have been higher particularly after meal. She is taking degludec (Tresiba) 30 units at night, Novolog 1 unit per 10 gram (normal week) and 1 unit per 8 gram (perimenstrual period week) and correction factor of 1 unit per 50 above 150 mg/dl. A1c is 7.4%. Reviewed Dexcom, average  glucose 192  (SD69). Time in range 58%. Her pattern is postprandial hyperglycemia after lunch and dinner. She has not had much of hypoglycemia anymore.    She has been thinking about insulin pump -- Tandem.  She did try insulin pump (Medtronic 630G) in 2017 but did not like it because her glucose seemed to be more fluctuated.    2) hypothyroidism: She has history of hypothyroidism for the past 11 years. It was diagnosed about 6 months postpartum. She is currently on Synthroid 125 mcg daily which was adjusted after the lab and above mentioned symptoms.     Past Medical History  Type 1 diabetes  Hypothyroidism    Allergies  Allergies   Allergen Reactions     Levothyroxine      Dizzy to generic --- not to synthroid     Medications  Current Outpatient Medications   Medication Sig Dispense Refill     citalopram (CELEXA) 20 MG tablet 1 tablet daily 30 tablet 0     Continuous Blood Gluc  (DEXCOM G6 ) MAGGY 1 Device continuous 1 Device 0     Continuous Blood Gluc Sensor (DEXCOM G6 SENSOR) MISC 1 each See Admin Instructions Change sensor every 10 days. 9 each 3     Continuous Blood Gluc Transmit (DEXCOM G6 TRANSMITTER) MISC 1 each every 3 months 1 each 3     insulin aspart (NOVOLOG FLEXPEN) 100 UNIT/ML pen USE 1 UNIT PER 10 GM CARB WITH BREAKFAST AND LUNCH AND 1 UNIT PER 8 GM CARB W/DINNER -=50UNITS/DAY 45 mL 3     insulin pen needle (B-D U/F) 31G X 5 MM miscellaneous USE 4 DAILY OR AS DIRECTED. 400 each 3     sertraline (ZOLOFT) 25 MG tablet Take 1 tablet (25 mg) by mouth daily 30 tablet 1     SYNTHROID 125 MCG tablet Take 1 tablet (125 mcg) by mouth daily 90 tablet 3     TRESIBA FLEXTOUCH 200 UNIT/ML pen INJECT 30 UNITS SUBCUTANEOUS DAILY 15 mL 3     blood glucose monitoring (ONETOUCH VERIO IQ) test strip Use to test blood sugar 10 times daily or as directed. (Patient not taking: Reported on 10/7/2019) 900 strip 3     glucagon (GLUCAGON EMERGENCY) 1 MG kit Inject 1 mg into the muscle once for 1 dose 1 mg 0      norgestim-eth estrad triphasic (ORTHO TRI-CYCLEN/TRI-SPRINTEC) 0.18/0.215/0.25 MG-35 MCG tablet Take 1 tablet by mouth daily 84 tablet 3     ONETOUCH DELICA LANCETS 33G MISC 1 Box 6 times daily (Patient not taking: Reported on 10/7/2019) 100 each 12     Family History  Type 1 diabetes in her father, mother, paternal aunt, paternal uncle, brother and sister  Type 2 diabetes in her grandmother  Heart in her maternal grandfather and maternal grandmother  hypothyroid in her brother, father, maternal grandfather, maternal grandmother, mother, and sister.     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    ROS  Constitutional: good appetite, no more panic attack  Eyes: no vision change, diplopia, no more blurred vision  Cardiovascular: no chest pain, palpitations  Respiratory: no dyspnea, cough, shortness of breath  GI: no nausea, vomiting, diarrhea or constipation, no abdominal pain   : no change in urine, no dysuria   Musculoskeletal: no joint swelling   Integumentary: occasional hive in the neck and arm  Neuro: no numbness or tingling, no tremor, no headache   Endo: no polyuria or polydipsia, no temperature intolerance   Psych: sleep well    Physical Exam  /89 (BP Location: Left arm, Patient Position: Sitting, Cuff Size: Adult Large)   Pulse 87   Wt 89 kg (196 lb 4.8 oz)   SpO2 96%   BMI 32.67 kg/m     Body mass index is 32.67 kg/m .  Constitutional: no distress, comfortable, pleasant   Eyes: anicteric  Musculoskeletal: no edema   Skin: no jaundice   Neurological: normal gait, intact sensation per monofilament test bilaterally (exam 2/4/19), no tremor  Psychological: appropriate mood     RESULTS    Lab Results   Component Value Date    A1C 7.4 10/07/2019    A1C 7.2 06/10/2019    A1C 7.5 02/04/2019    A1C 7.1 10/15/2018    A1C 6.8 06/04/2018      Ref. Range 2/5/2016 11:41   Glutamic Acid Decarboxylase Antibody Unknown 45.7 (H)   IA-2  Antibody Unknown 3.4 (H)   Islet Cell Antibody IgG Unknown <1:4...      Ref. Range 2/10/2016 08:35   C-Peptide Latest Ref Range: 0.9-6.9 ng/mL 0.7 (L)     ENDO DIABETES Latest Ref Rng & Units 10/15/2018   LDL CHOLESTEROL DIRECT <100 mg/dL 86     ENDO DIABETES Latest Ref Rng & Units 10/15/2018   ALBUMIN URINE MG/L mg/L <5   ALBUMIN URINE MG/G CR 0 - 25 mg/g Cr Unable to calculate due to low value   CREATININE 0.52 - 1.04 mg/dL 0.73         ASSESSMENT:    Ms Brigitte Robles is a 43 years old female who is here for follow up type 1 diabetes    1) type 1 diabetes: diagnosed type 1 diabetes in Feb 2016. A1c is 7.4% but has postprandial hyperglycemia and nocturnal hyperglycemia  - continue Tresiba 30 units daily  - increase Novolog 1 unit per 8 gram  - correction factor of 1 unit per 50 above 150 mg/dl.  - continue to check blood glucose 3-4 times per day   - she is considering Tandem T slim pump but is not yet ready    2) Hypothyroidism:clinically euthyroid. Lab returned normal in July. Continue Synthroid 125 mcg daily. Lab today    3) Obesity: BMI 34. Encourage to continue on healthy diet and exercise.    PLAN:   - TFT, AM random cortisol, lipid panel, urine microalbumin, tissue transglutaminase Ab  - Continue insulin degludec (Tresiba) 30 units daily  - increase Novolog 1 unit per 8 gram   - continue correction factor 1 unit per 50 above 150 mg/dl  - RTC 4 months     Lloyd Ledesma MD  Endocrinology  [phone]

## 2019-10-08 ENCOUNTER — OFFICE VISIT (OUTPATIENT)
Dept: FAMILY MEDICINE | Facility: CLINIC | Age: 44
End: 2019-10-08
Payer: COMMERCIAL

## 2019-10-08 VITALS
SYSTOLIC BLOOD PRESSURE: 128 MMHG | DIASTOLIC BLOOD PRESSURE: 80 MMHG | HEART RATE: 82 BPM | TEMPERATURE: 98.2 F | OXYGEN SATURATION: 98 %

## 2019-10-08 DIAGNOSIS — F32.0 MILD MAJOR DEPRESSION (H): ICD-10-CM

## 2019-10-08 DIAGNOSIS — E03.9 HYPOTHYROIDISM, UNSPECIFIED TYPE: ICD-10-CM

## 2019-10-08 DIAGNOSIS — E10.65 TYPE 1 DIABETES MELLITUS WITH HYPERGLYCEMIA (H): ICD-10-CM

## 2019-10-08 DIAGNOSIS — F41.1 GAD (GENERALIZED ANXIETY DISORDER): ICD-10-CM

## 2019-10-08 LAB
CHOLEST SERPL-MCNC: 177 MG/DL
CORTIS SERPL-MCNC: 13.3 UG/DL (ref 4–22)
CREAT UR-MCNC: 83 MG/DL
DEPRECATED CALCIDIOL+CALCIFEROL SERPL-MC: 34 UG/L (ref 20–75)
HDLC SERPL-MCNC: 71 MG/DL
LDLC SERPL CALC-MCNC: 94 MG/DL
MICROALBUMIN UR-MCNC: <5 MG/L
MICROALBUMIN/CREAT UR: NORMAL MG/G CR (ref 0–25)
NONHDLC SERPL-MCNC: 106 MG/DL
T4 FREE SERPL-MCNC: 1.14 NG/DL (ref 0.76–1.46)
TRIGL SERPL-MCNC: 61 MG/DL
TSH SERPL DL<=0.005 MIU/L-ACNC: 0.29 MU/L (ref 0.4–4)

## 2019-10-08 PROCEDURE — 80061 LIPID PANEL: CPT | Performed by: INTERNAL MEDICINE

## 2019-10-08 PROCEDURE — 82306 VITAMIN D 25 HYDROXY: CPT | Performed by: INTERNAL MEDICINE

## 2019-10-08 PROCEDURE — 84439 ASSAY OF FREE THYROXINE: CPT | Performed by: INTERNAL MEDICINE

## 2019-10-08 PROCEDURE — 82043 UR ALBUMIN QUANTITATIVE: CPT | Performed by: INTERNAL MEDICINE

## 2019-10-08 PROCEDURE — 99213 OFFICE O/P EST LOW 20 MIN: CPT | Performed by: PHYSICIAN ASSISTANT

## 2019-10-08 PROCEDURE — 82533 TOTAL CORTISOL: CPT | Performed by: INTERNAL MEDICINE

## 2019-10-08 PROCEDURE — 84443 ASSAY THYROID STIM HORMONE: CPT | Performed by: INTERNAL MEDICINE

## 2019-10-08 PROCEDURE — 36415 COLL VENOUS BLD VENIPUNCTURE: CPT | Performed by: INTERNAL MEDICINE

## 2019-10-08 PROCEDURE — 83516 IMMUNOASSAY NONANTIBODY: CPT | Mod: 59 | Performed by: INTERNAL MEDICINE

## 2019-10-08 RX ORDER — CITALOPRAM HYDROBROMIDE 20 MG/1
TABLET ORAL
Qty: 90 TABLET | Refills: 1 | Status: SHIPPED | OUTPATIENT
Start: 2019-10-08 | End: 2020-05-14

## 2019-10-08 ASSESSMENT — PATIENT HEALTH QUESTIONNAIRE - PHQ9
10. IF YOU CHECKED OFF ANY PROBLEMS, HOW DIFFICULT HAVE THESE PROBLEMS MADE IT FOR YOU TO DO YOUR WORK, TAKE CARE OF THINGS AT HOME, OR GET ALONG WITH OTHER PEOPLE: NOT DIFFICULT AT ALL
SUM OF ALL RESPONSES TO PHQ QUESTIONS 1-9: 0
SUM OF ALL RESPONSES TO PHQ QUESTIONS 1-9: 0

## 2019-10-08 ASSESSMENT — PAIN SCALES - GENERAL: PAINLEVEL: NO PAIN (0)

## 2019-10-08 ASSESSMENT — ANXIETY QUESTIONNAIRES
5. BEING SO RESTLESS THAT IT IS HARD TO SIT STILL: NOT AT ALL
GAD7 TOTAL SCORE: 0
3. WORRYING TOO MUCH ABOUT DIFFERENT THINGS: NOT AT ALL
1. FEELING NERVOUS, ANXIOUS, OR ON EDGE: NOT AT ALL
6. BECOMING EASILY ANNOYED OR IRRITABLE: NOT AT ALL
7. FEELING AFRAID AS IF SOMETHING AWFUL MIGHT HAPPEN: NOT AT ALL
4. TROUBLE RELAXING: NOT AT ALL
7. FEELING AFRAID AS IF SOMETHING AWFUL MIGHT HAPPEN: NOT AT ALL
2. NOT BEING ABLE TO STOP OR CONTROL WORRYING: NOT AT ALL
GAD7 TOTAL SCORE: 0
GAD7 TOTAL SCORE: 0

## 2019-10-08 NOTE — PROGRESS NOTES
Subjective     Brigitte Robles is a 44 year old female who presents to clinic today for the following health issues:    She started the citalopram and is doing well. No concerns.     History of Present Illness        Mental Health Follow-up:  Patient presents to follow-up on Depression & Anxiety.Patient's depression since last visit has been:  Better  The patient is not having other symptoms associated with depression.  Patient's anxiety since last visit has been:  Better  The patient is not having other symptoms associated with anxiety.  Any significant life events: health concerns  Patient is not feeling anxious or having panic attacks.  Patient has no concerns about alcohol or drug use.     Social History  Tobacco Use    Smoking status: Never Smoker    Smokeless tobacco: Never Used  Alcohol use: Yes    Comment: rare  Drug use: No      Today's PHQ-9         PHQ-9 Total Score:     (P) 0   PHQ-9 Q9 Thoughts of better off dead/self-harm past 2 weeks :   (P) Not at all   Thoughts of suicide or self harm:      Self-harm Plan:        Self-harm Action:          Safety concerns for self or others:           She eats 2-3 servings of fruits and vegetables daily.She consumes 0 sweetened beverage(s) daily.  She is taking medications regularly.           Reviewed and updated as needed this visit by Provider         Review of Systems   ROS COMP: Constitutional, HEENT, cardiovascular, pulmonary, GI, , musculoskeletal, neuro, skin, endocrine and psych systems are negative, except as otherwise noted.      Objective    /80   Pulse 82   Temp 98.2  F (36.8  C) (Oral)   SpO2 98%   There is no height or weight on file to calculate BMI.  Physical Exam   GENERAL: healthy, alert and no distress  PSYCH: mentation appears normal, affect normal/bright, judgement and insight intact and appearance well groomed    Diagnostic Test Results:  Labs reviewed in Epic        Assessment & Plan     1. MONTSERRAT (generalized anxiety  "disorder)  Improvement with the medication.   Continue at her current dose.   [facility], sooner if needed.   She is starting counseling within the next few weeks also.   - citalopram (CELEXA) 20 MG tablet; 1 tablet daily  Dispense: 90 tablet; Refill: 1    2. Mild major depression (H)  See above.   - citalopram (CELEXA) 20 MG tablet; 1 tablet daily  Dispense: 90 tablet; Refill: 1     BMI:   Estimated body mass index is 32.67 kg/m  as calculated from the following:    Height as of 6/4/19: 1.651 m (5' 5\").    Weight as of 10/7/19: 89 kg (196 lb 4.8 oz).   Weight management plan: Discussed healthy diet and exercise guidelines          Return in about 6 months (around 4/8/2020) for with primary provider, depression / anxiety via Mychart.    Kristen M. Kehr, PA-C  [facility]    "

## 2019-10-08 NOTE — NURSING NOTE
"Chief Complaint   Patient presents with     Depression     Anxiety       Initial /80   Pulse 82   Temp 98.2  F (36.8  C) (Oral)   SpO2 98%  Estimated body mass index is 32.67 kg/m  as calculated from the following:    Height as of 6/4/19: 1.651 m (5' 5\").    Weight as of 10/7/19: 89 kg (196 lb 4.8 oz).  Medication Reconciliation: complete    AUDRA Vera MA    "

## 2019-10-09 LAB
TTG IGA SER-ACNC: 2 U/ML
TTG IGG SER-ACNC: <1 U/ML

## 2019-10-09 ASSESSMENT — PATIENT HEALTH QUESTIONNAIRE - PHQ9: SUM OF ALL RESPONSES TO PHQ QUESTIONS 1-9: 0

## 2019-10-09 ASSESSMENT — ANXIETY QUESTIONNAIRES: GAD7 TOTAL SCORE: 0

## 2019-10-23 ENCOUNTER — MYC REFILL (OUTPATIENT)
Dept: ENDOCRINOLOGY | Facility: CLINIC | Age: 44
End: 2019-10-23

## 2019-10-23 DIAGNOSIS — E10.9 DIABETES MELLITUS TYPE 1 (H): ICD-10-CM

## 2019-10-24 RX ORDER — PROCHLORPERAZINE 25 MG/1
1 SUPPOSITORY RECTAL
Qty: 1 EACH | Refills: 3 | Status: SHIPPED | OUTPATIENT
Start: 2019-10-24 | End: 2020-10-13

## 2019-10-24 RX ORDER — PROCHLORPERAZINE 25 MG/1
1 SUPPOSITORY RECTAL SEE ADMIN INSTRUCTIONS
Qty: 9 EACH | Refills: 3 | Status: SHIPPED | OUTPATIENT
Start: 2019-10-24 | End: 2020-10-13

## 2019-12-19 ENCOUNTER — TELEPHONE (OUTPATIENT)
Dept: ENDOCRINOLOGY | Facility: CLINIC | Age: 44
End: 2019-12-19

## 2019-12-19 NOTE — TELEPHONE ENCOUNTER
1st Attempt LVM for Brigitte to call back due to needing to reschedule her follow up appointment with Dr Desai for 02/10/2020. I asked Brigitte to call [phone]. Per Dr Desai: Brigitte can download her diabetes equipment either in clinic or at home and we can set her up for a telephone visit with Dr Desai to discuss.     Evans Boo  Procedure , [facility] Adult Endocrinology

## 2019-12-19 NOTE — TELEPHONE ENCOUNTER
Brigitte called back and she scheduled a Telephone visit for February 10, 2020 at 930 am. Brigitte will come in on February 6 between 1 pm and 4 pm to have her dexcom downloaded.     Evans Boo  Procedure , [facility] Adult Endocrinology

## 2020-02-10 ENCOUNTER — VIRTUAL VISIT (OUTPATIENT)
Dept: ENDOCRINOLOGY | Facility: CLINIC | Age: 45
End: 2020-02-10
Payer: COMMERCIAL

## 2020-02-10 DIAGNOSIS — E03.9 HYPOTHYROIDISM, UNSPECIFIED TYPE: ICD-10-CM

## 2020-02-10 DIAGNOSIS — E10.9 TYPE 1 DIABETES MELLITUS WITHOUT COMPLICATION (H): ICD-10-CM

## 2020-02-10 DIAGNOSIS — E10.65 TYPE 1 DIABETES MELLITUS WITH HYPERGLYCEMIA (H): Primary | ICD-10-CM

## 2020-02-10 PROCEDURE — 99442 ZZC PHYSICIAN TELEPHONE EVALUATION 11-20 MIN: CPT | Performed by: INTERNAL MEDICINE

## 2020-02-10 NOTE — PROGRESS NOTES
"Brigitte Robles is a 44 year old female who is being evaluated via a billable telephone visit.      The patient has been notified of following:     \"This telephone visit will be conducted via a call between you and your physician/provider. We have found that certain health care needs can be provided without the need for a physical exam.  This service lets us provide the care you need with a short phone conversation.  If a prescription is necessary we can send it directly to your pharmacy.  If lab work is needed we can place an order for that and you can then stop by our lab to have the test done at a later time.    If during the course of the call the physician/provider feels a telephone visit is not appropriate, you will not be charged for this service.\"     Consent has been obtained for this service by 1 care team member: yes. See the scanned image in the medical record.    Brigitte Robles complains of    Chief Complaint   Patient presents with     Diabetes       I have reviewed and updated the patient's Past Medical History, Social History, Family History and Medication List.    ALLERGIES  Levothyroxine    Marielena Matthew CMA  Adult Endocrinology  [facility]       Start time: 9:30 am  End time: 9:45 am  Total time: 15 minutes    Patient stated that she is doing fine. However, she has not exercise routinely as she did in the past. She discontinued gym membership and now is getting treadmill at home. She has back injury and has not been active for the past week. Reviewed Dexcom, estimated A1c 8.2%.  Average blood sugar 202 with standard deviation 70.  Time in range 35%.  64% of sugars were above 180.  She is currently taking Tresiba between 28 to 32 units daily and NovoLog 1 unit per 8 to 10 g/day and sliding scale 1 unit per 50 above 150.  She said that over the past week she has been increasing her bolus due to high blood sugar.    She has been thinking about insulin pump " particularly OmniPod or T slim.  She has not made up her mind yet.    She is also taking Synthroid 125 mcg, 1 pill for 6-day and half a pill 1 day a week.  She is feeling well without jitteriness, fatigue or panic.    Assessment/Plan:  Ms Brigitte Robles is a 43 years old female who has telephone visit to follow up type 1 diabetes and hypothyroidism    1) type 1 diabetes: diagnosed type 1 diabetes in Feb 2016. Estimated A1c 8.2% based on Dexcom. She mainly has postprandial hyperglycemia.  - continue Tresiba 30 units daily  - continue Novolog 1 unit per 8 gram  - correction factor of 1 unit per 50 above 150 mg/dl.  - continue to use Dexcom G6  - she is considering Tandem T slim pump but is not yet ready     2) Hypothyroidism:clinically euthyroid. Lab returned normal in July. Continue Synthroid 125 mcg,  1 pill for 6-day and half a pill 1 day a week  Repeat lab at next visit    Return to clinic in 3 months    I have reviewed the note as documented above.  This accurately captures the substance of my conversation with the patient.    Total time of call between patient and provider was 15 minutes   Lloyd Ledesma MD  Division of Diabetes and Endocrinology  Department of Medicine

## 2020-05-13 DIAGNOSIS — F32.0 MILD MAJOR DEPRESSION (H): ICD-10-CM

## 2020-05-13 DIAGNOSIS — E06.3 HYPOTHYROIDISM DUE TO HASHIMOTO'S THYROIDITIS: ICD-10-CM

## 2020-05-13 DIAGNOSIS — F41.1 GAD (GENERALIZED ANXIETY DISORDER): ICD-10-CM

## 2020-05-13 NOTE — LETTER
May 14, 2020    Brigitte Robles  [address] [place] [id number]    Dear Brigitte,       We recently received a refill request for Citalopram (CELEXA) 20 MG tablet .  We have refilled this for a one time 30 day supply only because you are due for a:    Depression/Anxiety- Virtual Video visit or In Clinic office visit  Please call scheduling at [phone] to make one of these appointments    Please call at your earliest convenience so that there will not be a delay with your future refills.          Thank you,   Your [facility] Team/mkr  [phone]

## 2020-05-14 RX ORDER — CITALOPRAM HYDROBROMIDE 20 MG/1
TABLET ORAL
Qty: 30 TABLET | Refills: 0 | Status: SHIPPED | OUTPATIENT
Start: 2020-05-14 | End: 2020-05-28

## 2020-05-14 NOTE — TELEPHONE ENCOUNTER
30 day prescription sent to pharmacy.   Please have her schedule a video visit within the next 30 days. I am out of the office next week, but will be back in for video visits in the clinic the week of May 26th.   Thank you. Kristen Kehr PA-C

## 2020-05-14 NOTE — TELEPHONE ENCOUNTER
Patient overdue for OV for depression/ anxiety follow up.  No appointment pending at this time.  Routing to provider to advise.    Hanny HANSONN, RN

## 2020-05-15 RX ORDER — LEVOTHYROXINE SODIUM 125 MCG
TABLET ORAL
Qty: 90 TABLET | Refills: 3 | Status: SHIPPED | OUTPATIENT
Start: 2020-05-15 | End: 2021-01-25

## 2020-05-15 NOTE — TELEPHONE ENCOUNTER
"    SYNTHROID 125 MCG TABLET    Last Written Prescription Date:  6/11/2019  Last Fill Quantity: 90,   # refills: 3  Last Office Visit : 2/10/20  Future Office visit:  6/15/20  10/08/19  0833    TSH 0.29*       Routing refill request to provider for review/approval because:  Clarify.   Dosage requested / on medication list ( 125 mcg daily) is not the same as last MD plan(Synthroid 125 mcg,  1 pill for 6-day and half a pill 1 day a week)    2/10/20 Plan:'Hypothyroidism:clinically euthyroid. Lab returned normal in July. Continue Synthroid 125 mcg,  1 pill for 6-day and half a pill 1 day a week  Repeat lab at next visit\"       "

## 2020-05-27 NOTE — PROGRESS NOTES
"Brigitte Robles is a 44 year old female who is being evaluated via a billable telephone visit.      The patient has been notified of following:     \"This telephone visit will be conducted via a call between you and your physician/provider. We have found that certain health care needs can be provided without the need for a physical exam.  This service lets us provide the care you need with a short phone conversation.  If a prescription is necessary we can send it directly to your pharmacy.  If lab work is needed we can place an order for that and you can then stop by our lab to have the test done at a later time.    Telephone visits are billed at different rates depending on your insurance coverage. During this emergency period, for some insurers they may be billed the same as an in-person visit.  Please reach out to your insurance provider with any questions.    If during the course of the call the physician/provider feels a telephone visit is not appropriate, you will not be charged for this service.\"    Patient has given verbal consent for Telephone visit?  Yes    What phone number would you like to be contacted at? [phone]    How would you like to obtain your AVS? Declined      Subjective     Brigitte Robles is a 44 year old female who presents via phone visit today for the following health issues:    HPI      BP Readings from Last 2 Encounters:   10/08/19 128/80   10/07/19 137/89     Hemoglobin A1C (%)   Date Value   10/07/2019 7.4 (A)   06/10/2019 7.2 (A)     LDL Cholesterol Calculated (mg/dL)   Date Value   10/08/2019 94   09/18/2017 63     LDL Cholesterol Direct (mg/dL)   Date Value   10/15/2018 86                      Depression and Anxiety Follow-Up    How are you doing with your depression since your last visit? No change    How are you doing with your anxiety since your last visit?  Improved     Are you having other symptoms that might be associated with depression or anxiety? No    Have you had a " significant life event? No     Do you have any concerns with your use of alcohol or other drugs? No    Social History     Tobacco Use     Smoking status: Never Smoker     Smokeless tobacco: Never Used   Substance Use Topics     Alcohol use: Yes     Comment: rare     Drug use: No     PHQ 8/22/2019 10/8/2019 5/28/2020   PHQ-9 Total Score 25 0 0   Q9: Thoughts of better off dead/self-harm past 2 weeks Nearly every day Not at all Not at all   F/U: Thoughts of suicide or self-harm Yes - -   F/U: Self harm-plan Yes - -   F/U: Self-harm action No - -   F/U: Safety concerns No - -     MONTSERRAT-7 SCORE 8/22/2019 10/8/2019 5/28/2020   Total Score - - -   Total Score 7 (mild anxiety) 0 (minimal anxiety) -   Total Score 7 0 1     Last PHQ-9 5/28/2020   1.  Little interest or pleasure in doing things 0   2.  Feeling down, depressed, or hopeless 0   3.  Trouble falling or staying asleep, or sleeping too much 0   4.  Feeling tired or having little energy 0   5.  Poor appetite or overeating 0   6.  Feeling bad about yourself 0   7.  Trouble concentrating 0   8.  Moving slowly or restless 0   Q9: Thoughts of better off dead/self-harm past 2 weeks 0   PHQ-9 Total Score 0   Difficulty at work, home, or with people Not difficult at all   In the past two weeks have you had thoughts of suicide or self harm? -   Do you have concerns about your personal safety or the safety of others? -   In the past 2 weeks have you thought about a plan or had intention to harm yourself? -   In the past 2 weeks have you acted on these thoughts in any way? -     MONTSERRAT-7  5/28/2020   1. Feeling nervous, anxious, or on edge 0   2. Not being able to stop or control worrying 1   3. Worrying too much about different things 0   4. Trouble relaxing 0   5. Being so restless that it is hard to sit still 0   6. Becoming easily annoyed or irritable 0   7. Feeling afraid, as if something awful might happen 0   MONTSERRAT-7 Total Score 1   If you checked any problems, how difficult  have they made it for you to do your work, take care of things at home, or get along with other people? Not difficult at all       Suicide Assessment Five-step Evaluation and Treatment (SAFE-T)      Patient Active Problem List   Diagnosis     Depressive disorder, not elsewhere classified     Hypothyroidism     GLUCOCORTICOID DEFICIENT     Anxiety     CARDIOVASCULAR SCREENING; LDL GOAL LESS THAN 160     Seasonal allergic rhinitis     Type 1 diabetes mellitus without complication (H)     Past Surgical History:   Procedure Laterality Date     HC REMOVAL OF TONSILS,13+ Y/O         Social History     Tobacco Use     Smoking status: Never Smoker     Smokeless tobacco: Never Used   Substance Use Topics     Alcohol use: Yes     Comment: rare     Family History   Problem Relation Age of Onset     Diabetes Mother      Hypertension Mother      Thyroid Disease Mother      Diabetes Father         1     Thyroid Disease Father         BORIS'S     Diabetes Sister      Thyroid Disease Sister      Heart Disease Maternal Grandmother         TRIPLE BYPASS     Thyroid Disease Maternal Grandmother      Diabetes Maternal Grandmother         2     Heart Disease Maternal Grandfather         TRIPLE BYPASS     Coronary Artery Disease Maternal Grandfather      Thyroid Disease Maternal Grandfather      Thyroid Disease Brother      Diabetes Paternal Aunt      Diabetes Paternal Uncle      Diabetes Brother         1     Thyroid Disease Brother      Diabetes Nephew         1     Depression Sister      Thyroid Disease Sister      Diabetes Sister         1     Breast Cancer No family hx of      Cancer - colorectal No family hx of          Current Outpatient Medications   Medication Sig Dispense Refill     blood glucose monitoring (ONETOUCH VERIO IQ) test strip Use to test blood sugar 10 times daily or as directed. 900 strip 3     citalopram (CELEXA) 20 MG tablet TAKE 1 TABLET BY MOUTH EVERY DAY 90 tablet 1     Continuous Blood Gluc  (DEXCOM  G6 ) MAGGY 1 Device continuous 1 Device 0     Continuous Blood Gluc Sensor (DEXCOM G6 SENSOR) MISC 1 each See Admin Instructions Change sensor every 10 days. 9 each 3     Continuous Blood Gluc Transmit (DEXCOM G6 TRANSMITTER) MISC 1 each every 3 months 1 each 3     insulin aspart (NOVOLOG FLEXPEN) 100 UNIT/ML pen USE 1 UNIT PER 10 GM CARB WITH BREAKFAST AND LUNCH AND 1 UNIT PER 8 GM CARB W/DINNER -=50UNITS/DAY 45 mL 3     insulin degludec (TRESIBA FLEXTOUCH) 200 UNIT/ML pen INJECT 30 UNITS SUBCUTANEOUS DAILY 15 mL 3     insulin pen needle (B-D U/F) 31G X 5 MM miscellaneous USE 4 DAILY OR AS DIRECTED. 400 each 3     ONETOUCH DELICA LANCETS 33G MISC 1 Box 6 times daily 100 each 12     SYNTHROID 125 MCG tablet Synthroid 125 mcg,  1 pill for 6-day and half a pill 1 day a week per last note 2/10/20 90 tablet 3     glucagon (GLUCAGON EMERGENCY) 1 MG kit Inject 1 mg into the muscle once for 1 dose 1 mg 0     Allergies   Allergen Reactions     Levothyroxine      Dizzy to generic --- not to synthroid     BP Readings from Last 3 Encounters:   10/08/19 128/80   10/07/19 137/89   06/10/19 (!) 163/94    Wt Readings from Last 3 Encounters:   10/07/19 89 kg (196 lb 4.8 oz)   06/10/19 91.1 kg (200 lb 12.8 oz)   06/04/19 94.3 kg (208 lb)                    Reviewed and updated as needed this visit by Provider         Review of Systems   Constitutional, HEENT, cardiovascular, pulmonary, GI, , musculoskeletal, neuro, skin, endocrine and psych systems are negative, except as otherwise noted.       Objective   Reported vitals:  There were no vitals taken for this visit.   healthy, alert and no distress  PSYCH: Alert and oriented times 3; coherent speech, normal   rate and volume, able to articulate logical thoughts, able   to abstract reason, no tangential thoughts, no hallucinations   or delusions  Her affect is normal and pleasant  RESP: No cough, no audible wheezing, able to talk in full sentences  Remainder of exam unable  to be completed due to telephone visits    Diagnostic Test Results:  Labs reviewed in Epic        Assessment/Plan:  1. MONTSERRAT (generalized anxiety disorder)  2. Mild major depression (H)  She is doing well with citalopram 20 mg and will continue.   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 She will contact the clinic if any concerns.   - citalopram (CELEXA) 20 MG tablet; TAKE 1 TABLET BY MOUTH EVERY DAY  Dispense: 90 tablet; Refill: 1    Return in about 6 months (around 11/28/2020) for depression / anxiety.      Phone call duration:  5 minutes    Kristen M. Kehr, PA-C

## 2020-05-28 ENCOUNTER — VIRTUAL VISIT (OUTPATIENT)
Dept: FAMILY MEDICINE | Facility: CLINIC | Age: 45
End: 2020-05-28
Payer: COMMERCIAL

## 2020-05-28 DIAGNOSIS — F32.0 MILD MAJOR DEPRESSION (H): ICD-10-CM

## 2020-05-28 DIAGNOSIS — F41.1 GAD (GENERALIZED ANXIETY DISORDER): ICD-10-CM

## 2020-05-28 PROCEDURE — 99214 OFFICE O/P EST MOD 30 MIN: CPT | Mod: 95 | Performed by: PHYSICIAN ASSISTANT

## 2020-05-28 PROCEDURE — 96127 BRIEF EMOTIONAL/BEHAV ASSMT: CPT | Performed by: PHYSICIAN ASSISTANT

## 2020-05-28 RX ORDER — CITALOPRAM HYDROBROMIDE 20 MG/1
TABLET ORAL
Qty: 90 TABLET | Refills: 1 | Status: SHIPPED | OUTPATIENT
Start: 2020-05-28 | End: 2020-11-27

## 2020-05-28 ASSESSMENT — PATIENT HEALTH QUESTIONNAIRE - PHQ9
5. POOR APPETITE OR OVEREATING: NOT AT ALL
SUM OF ALL RESPONSES TO PHQ QUESTIONS 1-9: 0

## 2020-05-28 ASSESSMENT — ANXIETY QUESTIONNAIRES
7. FEELING AFRAID AS IF SOMETHING AWFUL MIGHT HAPPEN: NOT AT ALL
5. BEING SO RESTLESS THAT IT IS HARD TO SIT STILL: NOT AT ALL
3. WORRYING TOO MUCH ABOUT DIFFERENT THINGS: NOT AT ALL
1. FEELING NERVOUS, ANXIOUS, OR ON EDGE: NOT AT ALL
GAD7 TOTAL SCORE: 1
IF YOU CHECKED OFF ANY PROBLEMS ON THIS QUESTIONNAIRE, HOW DIFFICULT HAVE THESE PROBLEMS MADE IT FOR YOU TO DO YOUR WORK, TAKE CARE OF THINGS AT HOME, OR GET ALONG WITH OTHER PEOPLE: NOT DIFFICULT AT ALL
2. NOT BEING ABLE TO STOP OR CONTROL WORRYING: SEVERAL DAYS
6. BECOMING EASILY ANNOYED OR IRRITABLE: NOT AT ALL

## 2020-05-29 ASSESSMENT — ANXIETY QUESTIONNAIRES: GAD7 TOTAL SCORE: 1

## 2020-06-08 ENCOUNTER — MYC MEDICAL ADVICE (OUTPATIENT)
Dept: ENDOCRINOLOGY | Facility: CLINIC | Age: 45
End: 2020-06-08

## 2020-06-15 ENCOUNTER — VIRTUAL VISIT (OUTPATIENT)
Dept: ENDOCRINOLOGY | Facility: CLINIC | Age: 45
End: 2020-06-15
Payer: COMMERCIAL

## 2020-06-15 DIAGNOSIS — E10.65 TYPE 1 DIABETES MELLITUS WITH HYPERGLYCEMIA (H): Primary | ICD-10-CM

## 2020-06-15 DIAGNOSIS — E06.3 HYPOTHYROIDISM DUE TO HASHIMOTO'S THYROIDITIS: ICD-10-CM

## 2020-06-15 PROCEDURE — 99212 OFFICE O/P EST SF 10 MIN: CPT | Mod: TEL | Performed by: INTERNAL MEDICINE

## 2020-06-15 NOTE — PATIENT INSTRUCTIONS
- lab at your convenience  - return in 3-4 months    If you have any questions, please do not hesitate to call [facility] at [phone] and ask for Endocrinology clinic.    Sincerely,    Lloyd Ledesma MD  Endocrinology

## 2020-06-15 NOTE — PROGRESS NOTES
"Brigitte Robles is a 44 year old female who is being evaluated via a billable telephone visit.      The patient has been notified of following:     \"This telephone visit will be conducted via a call between you and your physician/provider. We have found that certain health care needs can be provided without the need for a physical exam.  This service lets us provide the care you need with a short phone conversation.  If a prescription is necessary we can send it directly to your pharmacy.  If lab work is needed we can place an order for that and you can then stop by our lab to have the test done at a later time.    Telephone visits are billed at different rates depending on your insurance coverage. During this emergency period, for some insurers they may be billed the same as an in-person visit.  Please reach out to your insurance provider with any questions.    If during the course of the call the physician/provider feels a telephone visit is not appropriate, you will not be charged for this service.\"    Patient has given verbal consent for Telephone visit?  Yes    What phone number would you like to be contacted at? [phone]    How would you like to obtain your AVS? MyChart             Endocrinology Note         Brigitte is a 44 year old female presents today for type 1 diabetes.    HPI  Ms Brigitte Robles is a 44 years old female who is here for type 1 diabetes    She was noted to have increased blurred vision and fatigue in February 2016 and noted to have blood glucose of 300s and A1C of 7.0%. She was initially diagnosed with type 2 diabetes and was started on metformin 500 mg bid which did not control her blood glucose. She was then seen by her PCP who ran more blood test and noted for low c-peptide and positive MONTSERRAT ab and IA2 antibody. She was then started on insulin since.    Interval history:  She has been doing well since last seen. She feels that her diabetes is ok.    1) type 1 diabetes: because of " symptoms above, her glucoses have been high particularly after meal. She is taking degludec (Tresiba) 30 units at night, Novolog 1 unit per 10 gram (normal week) and 1 unit per 8 gram (perimenstrual period week) and correction factor of 1 unit per 50 above 150 mg/dl. Last A1c is 7.4% (10/2019). Her pattern is postprandial hyperglycemia after lunch and dinner. She has not had much of hypoglycemia anymore.    She is not ready for insulin pump.  She did try insulin pump (Medtronic 630G) in 2017 but did not like it because her glucose seemed to be more fluctuated.        2) hypothyroidism: She has history of hypothyroidism for the past 11 years. It was diagnosed about 6 months postpartum. She is currently on Synthroid 125 mcg daily for 6 days and 1/2 pill for 1 day per week.     Past Medical History  Type 1 diabetes  Hypothyroidism    Allergies  Allergies   Allergen Reactions     Levothyroxine      Dizzy to generic --- not to synthroid     Medications  Current Outpatient Medications   Medication Sig Dispense Refill     citalopram (CELEXA) 20 MG tablet TAKE 1 TABLET BY MOUTH EVERY DAY 90 tablet 1     Continuous Blood Gluc  (DEXCOM G6 ) MAGGY 1 Device continuous 1 Device 0     Continuous Blood Gluc Sensor (DEXCOM G6 SENSOR) MISC 1 each See Admin Instructions Change sensor every 10 days. 9 each 3     Continuous Blood Gluc Transmit (DEXCOM G6 TRANSMITTER) MISC 1 each every 3 months 1 each 3     insulin aspart (NOVOLOG FLEXPEN) 100 UNIT/ML pen USE 1 UNIT PER 10 GM CARB WITH BREAKFAST AND LUNCH AND 1 UNIT PER 8 GM CARB W/DINNER -=50UNITS/DAY 45 mL 3     insulin degludec (TRESIBA FLEXTOUCH) 200 UNIT/ML pen INJECT 30 UNITS SUBCUTANEOUS DAILY 15 mL 3     SYNTHROID 125 MCG tablet Synthroid 125 mcg,  1 pill for 6-day and half a pill 1 day a week per last note 2/10/20 90 tablet 3     blood glucose monitoring (ONETOUCH VERIO IQ) test strip Use to test blood sugar 10 times daily or as directed. 900 strip 3     glucagon  (GLUCAGON EMERGENCY) 1 MG kit Inject 1 mg into the muscle once for 1 dose 1 mg 0     insulin pen needle (B-D U/F) 31G X 5 MM miscellaneous USE 4 DAILY OR AS DIRECTED. 400 each 3     ONETOUCH DELICA LANCETS 33G MISC 1 Box 6 times daily 100 each 12     Family History  Type 1 diabetes in her father, mother, paternal aunt, paternal uncle, brother and sister  Type 2 diabetes in her grandmother  Heart in her maternal grandfather and maternal grandmother  hypothyroid in her brother, father, maternal grandfather, maternal grandmother, mother, and sister.     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    ROS  10 points ROS were negative otherwise mentioned in HPI    Physical Exam  Constitutional: no distress, comfortable, pleasant     RESULTS    Lab Results   Component Value Date    A1C 7.4 10/07/2019    A1C 7.2 06/10/2019    A1C 7.5 02/04/2019    A1C 7.1 10/15/2018    A1C 6.8 06/04/2018     ENDO DIABETES Latest Ref Rng & Units 10/8/2019   CHOLESTEROL <200 mg/dL 177   LDL CHOLESTEROL, CALCULATED <100 mg/dL 94   LDL CHOLESTEROL DIRECT <100 mg/dL    HDL CHOLESTEROL >49 mg/dL 71   VLDL-CHOLESTEROL 0 - 30 mg/dL    NON HDL CHOLESTEROL <130 mg/dL 106   TRIGLYCERIDES <150 mg/dL 61   ALBUMIN URINE MG/L mg/L <5   ALBUMIN URINE MG/G CR 0 - 25 mg/g Cr Unable to calculate due to low value      Ref. Range 2/5/2016 11:41   Glutamic Acid Decarboxylase Antibody Unknown 45.7 (H)   IA-2 Antibody Unknown 3.4 (H)   Islet Cell Antibody IgG Unknown <1:4...      Ref. Range 2/10/2016 08:35   C-Peptide Latest Ref Range: 0.9-6.9 ng/mL 0.7 (L)       ASSESSMENT:    Ms Brigitte Robles is a 43 years old female who has telephone visit for follow up type 1 diabetes    1) type 1 diabetes: diagnosed type 1 diabetes in Feb 2016. A1c was 7.4% but has postprandial hyperglycemia and nocturnal hyperglycemia  - continue Tresiba 30 units daily  - increase Novolog 1 unit per 8 gram for lunch and  dinner, continue 1 unit per 8 gram of breakfast  - correction factor of 1 unit per 50 above 150 mg/dl.  - continue to check blood glucose 3-4 times per day   - she is considering Tandem T slim pump but is not yet ready    2) Hypothyroidism:clinically euthyroid. Lab returned normal in July. Continue Synthroid 125 mcg x6 days per week and 1/2 pill for 1 day per week. Lab today    3) Obesity: BMI 34. Encourage to continue on healthy diet and exercise.    PLAN:   - TFT and A1c  - Continue insulin degludec (Tresiba) 30 units daily  - increase Novolog 1 unit per 8 gram  for lunch and dinner, continue 1 unit per 8 gram of breakfast  - continue correction factor 1 unit per 50 above 150 mg/dl  - RTC 4 months     Phone start time:4:22 pm  Phone end time:4:28 pm  Phone call duration: 6 minutes    Lloyd Ledesma MD  Endocrinology  [phone]

## 2020-06-15 NOTE — LETTER
"    6/15/2020         RE: Brigitte Robles  [address] [place] [id number]        Dear Colleague,    Thank you for referring your patient, Brigitte Robles, to the [facility]. Please see a copy of my visit note below.    Brigitte Robles is a 44 year old female who is being evaluated via a billable telephone visit.      The patient has been notified of following:     \"This telephone visit will be conducted via a call between you and your physician/provider. We have found that certain health care needs can be provided without the need for a physical exam.  This service lets us provide the care you need with a short phone conversation.  If a prescription is necessary we can send it directly to your pharmacy.  If lab work is needed we can place an order for that and you can then stop by our lab to have the test done at a later time.    Telephone visits are billed at different rates depending on your insurance coverage. During this emergency period, for some insurers they may be billed the same as an in-person visit.  Please reach out to your insurance provider with any questions.    If during the course of the call the physician/provider feels a telephone visit is not appropriate, you will not be charged for this service.\"    Patient has given verbal consent for Telephone visit?  Yes    What phone number would you like to be contacted at? [phone]    How would you like to obtain your AVS? MyChart             Endocrinology Note         Brigitte is a 44 year old female presents today for type 1 diabetes.    HPI  Ms Brigitte Robles is a 44 years old female who is here for type 1 diabetes    She was noted to have increased blurred vision and fatigue in February 2016 and noted to have blood glucose of 300s and A1C of 7.0%. She was initially diagnosed with type 2 diabetes and was started on metformin 500 mg bid which did not control her blood glucose. She was then seen by her PCP who ran " more blood test and noted for low c-peptide and positive MONTSERRAT ab and IA2 antibody. She was then started on insulin since.    Interval history:  She has been doing well since last seen. She feels that her diabetes is ok.    1) type 1 diabetes: because of symptoms above, her glucoses have been high particularly after meal. She is taking degludec (Tresiba) 30 units at night, Novolog 1 unit per 10 gram (normal week) and 1 unit per 8 gram (perimenstrual period week) and correction factor of 1 unit per 50 above 150 mg/dl. Last A1c is 7.4% (10/2019). Her pattern is postprandial hyperglycemia after lunch and dinner. She has not had much of hypoglycemia anymore.    She is not ready for insulin pump.  She did try insulin pump (Medtronic 630G) in 2017 but did not like it because her glucose seemed to be more fluctuated.        2) hypothyroidism: She has history of hypothyroidism for the past 11 years. It was diagnosed about 6 months postpartum. She is currently on Synthroid 125 mcg daily for 6 days and 1/2 pill for 1 day per week.     Past Medical History  Type 1 diabetes  Hypothyroidism    Allergies  Allergies   Allergen Reactions     Levothyroxine      Dizzy to generic --- not to synthroid     Medications  Current Outpatient Medications   Medication Sig Dispense Refill     citalopram (CELEXA) 20 MG tablet TAKE 1 TABLET BY MOUTH EVERY DAY 90 tablet 1     Continuous Blood Gluc  (DEXCOM G6 ) MAGGY 1 Device continuous 1 Device 0     Continuous Blood Gluc Sensor (DEXCOM G6 SENSOR) MISC 1 each See Admin Instructions Change sensor every 10 days. 9 each 3     Continuous Blood Gluc Transmit (DEXCOM G6 TRANSMITTER) MISC 1 each every 3 months 1 each 3     insulin aspart (NOVOLOG FLEXPEN) 100 UNIT/ML pen USE 1 UNIT PER 10 GM CARB WITH BREAKFAST AND LUNCH AND 1 UNIT PER 8 GM CARB W/DINNER -=50UNITS/DAY 45 mL 3     insulin degludec (TRESIBA FLEXTOUCH) 200 UNIT/ML pen INJECT 30 UNITS SUBCUTANEOUS DAILY 15 mL 3     SYNTHROID  125 MCG tablet Synthroid 125 mcg,  1 pill for 6-day and half a pill 1 day a week per last note 2/10/20 90 tablet 3     blood glucose monitoring (ONETOUCH VERIO IQ) test strip Use to test blood sugar 10 times daily or as directed. 900 strip 3     glucagon (GLUCAGON EMERGENCY) 1 MG kit Inject 1 mg into the muscle once for 1 dose 1 mg 0     insulin pen needle (B-D U/F) 31G X 5 MM miscellaneous USE 4 DAILY OR AS DIRECTED. 400 each 3     ONETOUCH DELICA LANCETS 33G MISC 1 Box 6 times daily 100 each 12     Family History  Type 1 diabetes in her father, mother, paternal aunt, paternal uncle, brother and sister  Type 2 diabetes in her grandmother  Heart in her maternal grandfather and maternal grandmother  hypothyroid in her brother, father, maternal grandfather, maternal grandmother, mother, and sister.     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    ROS  10 points ROS were negative otherwise mentioned in HPI    Physical Exam  Constitutional: no distress, comfortable, pleasant     RESULTS    Lab Results   Component Value Date    A1C 7.4 10/07/2019    A1C 7.2 06/10/2019    A1C 7.5 02/04/2019    A1C 7.1 10/15/2018    A1C 6.8 06/04/2018     ENDO DIABETES Latest Ref Rng & Units 10/8/2019   CHOLESTEROL <200 mg/dL 177   LDL CHOLESTEROL, CALCULATED <100 mg/dL 94   LDL CHOLESTEROL DIRECT <100 mg/dL    HDL CHOLESTEROL >49 mg/dL 71   VLDL-CHOLESTEROL 0 - 30 mg/dL    NON HDL CHOLESTEROL <130 mg/dL 106   TRIGLYCERIDES <150 mg/dL 61   ALBUMIN URINE MG/L mg/L <5   ALBUMIN URINE MG/G CR 0 - 25 mg/g Cr Unable to calculate due to low value      Ref. Range 2/5/2016 11:41   Glutamic Acid Decarboxylase Antibody Unknown 45.7 (H)   IA-2 Antibody Unknown 3.4 (H)   Islet Cell Antibody IgG Unknown <1:4...      Ref. Range 2/10/2016 08:35   C-Peptide Latest Ref Range: 0.9-6.9 ng/mL 0.7 (L)       ASSESSMENT:    Ms Brigitte Robles is a 43 years old female who has telephone visit  for follow up type 1 diabetes    1) type 1 diabetes: diagnosed type 1 diabetes in Feb 2016. A1c was 7.4% but has postprandial hyperglycemia and nocturnal hyperglycemia  - continue Tresiba 30 units daily  - increase Novolog 1 unit per 8 gram for lunch and dinner, continue 1 unit per 8 gram of breakfast  - correction factor of 1 unit per 50 above 150 mg/dl.  - continue to check blood glucose 3-4 times per day   - she is considering Tandem T slim pump but is not yet ready    2) Hypothyroidism:clinically euthyroid. Lab returned normal in July. Continue Synthroid 125 mcg x6 days per week and 1/2 pill for 1 day per week. Lab today    3) Obesity: BMI 34. Encourage to continue on healthy diet and exercise.    PLAN:   - TFT and A1c  - Continue insulin degludec (Tresiba) 30 units daily  - increase Novolog 1 unit per 8 gram  for lunch and dinner, continue 1 unit per 8 gram of breakfast  - continue correction factor 1 unit per 50 above 150 mg/dl  - RTC 4 months     Phone start time:4:22 pm  Phone end time:4:28 pm  Phone call duration: 6 minutes    Lloyd Ledesma MD  Endocrinology  [phone]

## 2020-06-23 DIAGNOSIS — E06.3 HYPOTHYROIDISM DUE TO HASHIMOTO'S THYROIDITIS: ICD-10-CM

## 2020-06-23 DIAGNOSIS — E10.65 TYPE 1 DIABETES MELLITUS WITH HYPERGLYCEMIA (H): ICD-10-CM

## 2020-06-23 LAB
HBA1C MFR BLD: 7.7 % (ref 0–5.6)
T4 FREE SERPL-MCNC: 1.17 NG/DL (ref 0.76–1.46)
TSH SERPL DL<=0.005 MIU/L-ACNC: 1.14 MU/L (ref 0.4–4)

## 2020-06-23 PROCEDURE — 83036 HEMOGLOBIN GLYCOSYLATED A1C: CPT | Performed by: INTERNAL MEDICINE

## 2020-06-23 PROCEDURE — 84443 ASSAY THYROID STIM HORMONE: CPT | Performed by: INTERNAL MEDICINE

## 2020-06-23 PROCEDURE — 84439 ASSAY OF FREE THYROXINE: CPT | Performed by: INTERNAL MEDICINE

## 2020-06-23 PROCEDURE — 36415 COLL VENOUS BLD VENIPUNCTURE: CPT | Performed by: INTERNAL MEDICINE

## 2020-06-29 ENCOUNTER — TELEPHONE (OUTPATIENT)
Dept: FAMILY MEDICINE | Facility: CLINIC | Age: 45
End: 2020-06-29

## 2020-07-30 ENCOUNTER — TELEPHONE (OUTPATIENT)
Dept: FAMILY MEDICINE | Facility: CLINIC | Age: 45
End: 2020-07-30

## 2020-07-30 NOTE — TELEPHONE ENCOUNTER
Panel Management Review      Patient has the following on her problem list:     Diabetes    ASA: none    Last A1C  Lab Results   Component Value Date    A1C 7.7 06/23/2020    A1C 7.4 10/07/2019    A1C 7.2 06/10/2019    A1C 7.5 02/04/2019    A1C 7.1 10/15/2018     A1C tested: MONITOR    Last LDL:    Lab Results   Component Value Date    CHOL 177 10/08/2019     Lab Results   Component Value Date    HDL 71 10/08/2019     Lab Results   Component Value Date    LDL 94 10/08/2019     Lab Results   Component Value Date    TRIG 61 10/08/2019     Lab Results   Component Value Date    CHOLHDLRATIO 2.4 07/28/2015     Lab Results   Component Value Date    NHDL 106 10/08/2019       Is the patient on a Statin? NO             Is the patient on Aspirin? NO        Last three blood pressure readings:  BP Readings from Last 3 Encounters:   10/08/19 128/80   10/07/19 137/89   06/10/19 (!) 163/94       Date of last diabetes office visit: Virtual visit with Endocrine on 06/15/20     Tobacco History:     History   Smoking Status     Never Smoker   Smokeless Tobacco     Never Used           Composite cancer screening  Chart review shows that this patient is due/due soon for the following None  Summary:    Patient is due/failing the following:   Patient see Endocrine    Action needed:   none    Type of outreach:    none    Questions for provider review:    None                                                                                                                                    Roeg GARCIA MA       Chart routed to close .        .[facility]

## 2020-09-28 ENCOUNTER — VIRTUAL VISIT (OUTPATIENT)
Dept: ENDOCRINOLOGY | Facility: CLINIC | Age: 45
End: 2020-09-28
Payer: COMMERCIAL

## 2020-09-28 DIAGNOSIS — E10.65 TYPE 1 DIABETES MELLITUS WITH HYPERGLYCEMIA (H): Primary | ICD-10-CM

## 2020-09-28 PROCEDURE — 99214 OFFICE O/P EST MOD 30 MIN: CPT | Mod: 95 | Performed by: INTERNAL MEDICINE

## 2020-09-28 NOTE — PATIENT INSTRUCTIONS
Lab this time    Return in 3-4 months    If you have any questions, please do not hesitate to call [facility] at [phone] and ask for Endocrinology clinic.    Sincerely,    Lloyd Ledesma MD  Endocrinology

## 2020-09-28 NOTE — PROGRESS NOTES
"Dexcom linked  Brigitte Robles is a 45 year old female who is being evaluated via a billable video visit.      The patient has been notified of following:     \"This video visit will be conducted via a call between you and your physician/provider. We have found that certain health care needs can be provided without the need for an in-person physical exam.  This service lets us provide the care you need with a video conversation.  If a prescription is necessary we can send it directly to your pharmacy.  If lab work is needed we can place an order for that and you can then stop by our lab to have the test done at a later time.    Video visits are billed at different rates depending on your insurance coverage.  Please reach out to your insurance provider with any questions.    If during the course of the call the physician/provider feels a video visit is not appropriate, you will not be charged for this service.\"    Patient has given verbal consent for Video visit? Yes  How would you like to obtain your AVS? MyChart  If you are dropped from the video visit, the video invite should be resent to: Text to cell phone: [phone]  Will anyone else be joining your video visit? No        Video-Visit Details    Type of service:  Video Visit    Video Start Time: 3:32 PM  Video End Time: 3:49 PM    Originating Location (pt. Location): Home    Distant Location (provider location):  [facility]     Platform used for Video Visit: [facility]             Endocrinology Note         Brigitte is a 45 year old female who has VDO visit for type 1 diabetes.    HPI  Ms Brigitte Robles is a 45 years old female who has VDO visit for type 1 diabetes. Last seen 6/2020.    She was noted to have increased blurred vision and fatigue in February 2016 and noted to have blood glucose of 300s and A1C of 7.0%. She was initially diagnosed with type 2 diabetes and was started on metformin 500 mg bid which did not control her blood glucose. " She was then seen by her PCP who ran more blood test and noted for low c-peptide and positive MONTSERRAT ab and IA2 antibody. She was then started on insulin since.    Interval history:  She has been doing well since last seen. She feels that her diabetes is ok except on the week of menstrual cycle.    1) type 1 diabetes: She is taking degludec (Tresiba) 30 units at night, Novolog 1 unit per 10 gram at breakfast and 1 unit per 8 gram at lunch and dinner and correction factor of 1 unit per 50 above 150 mg/dl. Last A1c is 7.7% (6/2020). Her pattern is postprandial hyperglycemia after lunch and dinner. She has not had much of hypoglycemia anymore.    She is ready to try insulin pump that connected to Dexcom. She did try insulin pump (Medtronic 630G) in 2017 but did not like it because her glucose seemed to be more fluctuated.        2) hypothyroidism: She has history of hypothyroidism for the past 11 years. It was diagnosed about 6 months postpartum. She is currently on Synthroid 125 mcg daily for 6 days and 1/2 pill for 1 day per week.     Lab on 6/23/2020 showed TSH 1.14, FT4 1.17.    Past Medical History  Type 1 diabetes  Hypothyroidism    Allergies  Allergies   Allergen Reactions     Levothyroxine      Dizzy to generic --- not to synthroid     Medications  Current Outpatient Medications   Medication Sig Dispense Refill     citalopram (CELEXA) 20 MG tablet TAKE 1 TABLET BY MOUTH EVERY DAY 90 tablet 1     Continuous Blood Gluc  (DEXCOM G6 ) MAGGY 1 Device continuous 1 Device 0     Continuous Blood Gluc Sensor (DEXCOM G6 SENSOR) MISC 1 each See Admin Instructions Change sensor every 10 days. 9 each 3     Continuous Blood Gluc Transmit (DEXCOM G6 TRANSMITTER) MISC 1 each every 3 months 1 each 3     insulin aspart (NOVOLOG FLEXPEN) 100 UNIT/ML pen USE 1 UNIT PER 10 GM CARB WITH BREAKFAST AND LUNCH AND 1 UNIT PER 8 GM CARB W/DINNER -=50UNITS/DAY 45 mL 3     insulin degludec (TRESIBA FLEXTOUCH) 200 UNIT/ML pen  INJECT 30 UNITS SUBCUTANEOUS DAILY 15 mL 3     insulin pen needle (B-D U/F) 31G X 5 MM miscellaneous USE 4 DAILY OR AS DIRECTED. 400 each 3     ONETOUCH DELICA LANCETS 33G MISC 1 Box 6 times daily 100 each 12     SYNTHROID 125 MCG tablet Synthroid 125 mcg,  1 pill for 6-day and half a pill 1 day a week per last note 2/10/20 90 tablet 3     blood glucose monitoring (ONETOUCH VERIO IQ) test strip Use to test blood sugar 10 times daily or as directed. (Patient not taking: Reported on 9/28/2020) 900 strip 3     glucagon (GLUCAGON EMERGENCY) 1 MG kit Inject 1 mg into the muscle once for 1 dose 1 mg 0     Family History  Type 1 diabetes in her father, mother, paternal aunt, paternal uncle, brother and sister  Type 2 diabetes in her grandmother  Heart in her maternal grandfather and maternal grandmother  hypothyroid in her brother, father, maternal grandfather, maternal grandmother, mother, and sister.     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    ROS  10 points ROS were negative otherwise mentioned in HPI    Physical Exam  Limited due to virtual visit  Constitutional: no distress, comfortable, pleasant     RESULTS  Lab Results   Component Value Date    A1C 7.7 06/23/2020    A1C 7.4 10/07/2019    A1C 7.2 06/10/2019    A1C 7.5 02/04/2019    A1C 7.1 10/15/2018       ENDO DIABETES Latest Ref Rng & Units 10/8/2019   CHOLESTEROL <200 mg/dL 177   LDL CHOLESTEROL, CALCULATED <100 mg/dL 94   LDL CHOLESTEROL DIRECT <100 mg/dL    HDL CHOLESTEROL >49 mg/dL 71   VLDL-CHOLESTEROL 0 - 30 mg/dL    NON HDL CHOLESTEROL <130 mg/dL 106   TRIGLYCERIDES <150 mg/dL 61   ALBUMIN URINE MG/L mg/L <5   ALBUMIN URINE MG/G CR 0 - 25 mg/g Cr Unable to calculate due to low value      Ref. Range 2/5/2016 11:41   Glutamic Acid Decarboxylase Antibody Unknown 45.7 (H)   IA-2 Antibody Unknown 3.4 (H)   Islet Cell Antibody IgG Unknown <1:4...      Ref. Range 2/10/2016 08:35    C-Peptide Latest Ref Range: 0.9-6.9 ng/mL 0.7 (L)         ASSESSMENT:    Ms Brigitte Robles is a 45 years old female who has VDO visit for follow up type 1 diabetes and hypothyroidism    1) type 1 diabetes: diagnosed type 1 diabetes in Feb 2016. A1c was 7.7% in June 2020. She usually has postprandial hyperglycemia and nocturnal hyperglycemia    Plan:  - continue Tresiba 30 units daily  - continue Novolog 1 unit per 8 gram for lunch and dinner, continue 1 unit per 8 gram of breakfast  - correction factor of 1 unit per 50 above 150 mg/dl.  - continue to check blood glucose 3-4 times per day   - continue using Dexcom  - will start process of Tandem T slim pump     2) Hypothyroidism:clinically euthyroid. Lab returned normal in June. Continue Synthroid 125 mcg x6 days per week and 1/2 pill for 1 day per week. Lab today    3) Obesity: BMI 34. Encourage to continue on healthy diet and exercise.    PLAN:   - A1c, lipid, urine albumin, Cr  - continue insulin degludec (Tresiba) 30 units daily  - continue Novolog 1 unit per 8 gram  for lunch and dinner, continue 1 unit per 8 gram of breakfast  - continue correction factor 1 unit per 50 above 150 mg/dl  - RTC 4 months     Lloyd Ledesma MD  Endocrinology  [phone]

## 2020-09-28 NOTE — LETTER
"    9/28/2020         RE: Brigitte Robles  [address] [place] [id number]        Dear Colleague,    Thank you for referring your patient, Brigitte Robles, to the [facility]. Please see a copy of my visit note below.    Dexcom linked  Brigitte Robles is a 45 year old female who is being evaluated via a billable video visit.      The patient has been notified of following:     \"This video visit will be conducted via a call between you and your physician/provider. We have found that certain health care needs can be provided without the need for an in-person physical exam.  This service lets us provide the care you need with a video conversation.  If a prescription is necessary we can send it directly to your pharmacy.  If lab work is needed we can place an order for that and you can then stop by our lab to have the test done at a later time.    Video visits are billed at different rates depending on your insurance coverage.  Please reach out to your insurance provider with any questions.    If during the course of the call the physician/provider feels a video visit is not appropriate, you will not be charged for this service.\"    Patient has given verbal consent for Video visit? Yes  How would you like to obtain your AVS? MyChart  If you are dropped from the video visit, the video invite should be resent to: Text to cell phone: [phone]  Will anyone else be joining your video visit? No        Video-Visit Details    Type of service:  Video Visit    Video Start Time: 3:32 PM  Video End Time: 3:49 PM    Originating Location (pt. Location): Home    Distant Location (provider location):  [facility]     Platform used for Video Visit: [employer]             Endocrinology Note         Brigitte is a 45 year old female who has VDO visit for type 1 diabetes.    HPI  Ms Brigitte Robles is a 45 years old female who has VDO visit for type 1 diabetes. Last seen 6/2020.    She " was noted to have increased blurred vision and fatigue in February 2016 and noted to have blood glucose of 300s and A1C of 7.0%. She was initially diagnosed with type 2 diabetes and was started on metformin 500 mg bid which did not control her blood glucose. She was then seen by her PCP who ran more blood test and noted for low c-peptide and positive MONTSERRAT ab and IA2 antibody. She was then started on insulin since.    Interval history:  She has been doing well since last seen. She feels that her diabetes is ok except on the week of menstrual cycle.    1) type 1 diabetes: She is taking degludec (Tresiba) 30 units at night, Novolog 1 unit per 10 gram at breakfast and 1 unit per 8 gram at lunch and dinner and correction factor of 1 unit per 50 above 150 mg/dl. Last A1c is 7.7% (6/2020). Her pattern is postprandial hyperglycemia after lunch and dinner. She has not had much of hypoglycemia anymore.    She is ready to try insulin pump that connected to Dexcom. She did try insulin pump (Medtronic 630G) in 2017 but did not like it because her glucose seemed to be more fluctuated.        2) hypothyroidism: She has history of hypothyroidism for the past 11 years. It was diagnosed about 6 months postpartum. She is currently on Synthroid 125 mcg daily for 6 days and 1/2 pill for 1 day per week.     Lab on 6/23/2020 showed TSH 1.14, FT4 1.17.    Past Medical History  Type 1 diabetes  Hypothyroidism    Allergies  Allergies   Allergen Reactions     Levothyroxine      Dizzy to generic --- not to synthroid     Medications  Current Outpatient Medications   Medication Sig Dispense Refill     citalopram (CELEXA) 20 MG tablet TAKE 1 TABLET BY MOUTH EVERY DAY 90 tablet 1     Continuous Blood Gluc  (DEXCOM G6 ) MAGGY 1 Device continuous 1 Device 0     Continuous Blood Gluc Sensor (DEXCOM G6 SENSOR) MISC 1 each See Admin Instructions Change sensor every 10 days. 9 each 3     Continuous Blood Gluc Transmit (DEXCOM G6  TRANSMITTER) MISC 1 each every 3 months 1 each 3     insulin aspart (NOVOLOG FLEXPEN) 100 UNIT/ML pen USE 1 UNIT PER 10 GM CARB WITH BREAKFAST AND LUNCH AND 1 UNIT PER 8 GM CARB W/DINNER -=50UNITS/DAY 45 mL 3     insulin degludec (TRESIBA FLEXTOUCH) 200 UNIT/ML pen INJECT 30 UNITS SUBCUTANEOUS DAILY 15 mL 3     insulin pen needle (B-D U/F) 31G X 5 MM miscellaneous USE 4 DAILY OR AS DIRECTED. 400 each 3     ONETOUCH DELICA LANCETS 33G MISC 1 Box 6 times daily 100 each 12     SYNTHROID 125 MCG tablet Synthroid 125 mcg,  1 pill for 6-day and half a pill 1 day a week per last note 2/10/20 90 tablet 3     blood glucose monitoring (ONETOUCH VERIO IQ) test strip Use to test blood sugar 10 times daily or as directed. (Patient not taking: Reported on 9/28/2020) 900 strip 3     glucagon (GLUCAGON EMERGENCY) 1 MG kit Inject 1 mg into the muscle once for 1 dose 1 mg 0     Family History  Type 1 diabetes in her father, mother, paternal aunt, paternal uncle, brother and sister  Type 2 diabetes in her grandmother  Heart in her maternal grandfather and maternal grandmother  hypothyroid in her brother, father, maternal grandfather, maternal grandmother, mother, and sister.     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    ROS  10 points ROS were negative otherwise mentioned in HPI    Physical Exam  Limited due to virtual visit  Constitutional: no distress, comfortable, pleasant     RESULTS  Lab Results   Component Value Date    A1C 7.7 06/23/2020    A1C 7.4 10/07/2019    A1C 7.2 06/10/2019    A1C 7.5 02/04/2019    A1C 7.1 10/15/2018       ENDO DIABETES Latest Ref Rng & Units 10/8/2019   CHOLESTEROL <200 mg/dL 177   LDL CHOLESTEROL, CALCULATED <100 mg/dL 94   LDL CHOLESTEROL DIRECT <100 mg/dL    HDL CHOLESTEROL >49 mg/dL 71   VLDL-CHOLESTEROL 0 - 30 mg/dL    NON HDL CHOLESTEROL <130 mg/dL 106   TRIGLYCERIDES <150 mg/dL 61   ALBUMIN URINE MG/L mg/L <5   ALBUMIN  URINE MG/G CR 0 - 25 mg/g Cr Unable to calculate due to low value      Ref. Range 2/5/2016 11:41   Glutamic Acid Decarboxylase Antibody Unknown 45.7 (H)   IA-2 Antibody Unknown 3.4 (H)   Islet Cell Antibody IgG Unknown <1:4...      Ref. Range 2/10/2016 08:35   C-Peptide Latest Ref Range: 0.9-6.9 ng/mL 0.7 (L)         ASSESSMENT:    Ms Brigitte Robles is a 45 years old female who has VDO visit for follow up type 1 diabetes and hypothyroidism    1) type 1 diabetes: diagnosed type 1 diabetes in Feb 2016. A1c was 7.7% in June 2020. She usually has postprandial hyperglycemia and nocturnal hyperglycemia    Plan:  - continue Tresiba 30 units daily  - continue Novolog 1 unit per 8 gram for lunch and dinner, continue 1 unit per 8 gram of breakfast  - correction factor of 1 unit per 50 above 150 mg/dl.  - continue to check blood glucose 3-4 times per day   - continue using Dexcom  - will start process of Tandem T slim pump     2) Hypothyroidism:clinically euthyroid. Lab returned normal in June. Continue Synthroid 125 mcg x6 days per week and 1/2 pill for 1 day per week. Lab today    3) Obesity: BMI 34. Encourage to continue on healthy diet and exercise.    PLAN:   - A1c, lipid, urine albumin, Cr  - continue insulin degludec (Tresiba) 30 units daily  - continue Novolog 1 unit per 8 gram  for lunch and dinner, continue 1 unit per 8 gram of breakfast  - continue correction factor 1 unit per 50 above 150 mg/dl  - RTC 4 months     Lloyd Ledesma MD  Endocrinology  [phone]

## 2020-09-29 ENCOUNTER — TELEPHONE (OUTPATIENT)
Dept: ENDOCRINOLOGY | Facility: CLINIC | Age: 45
End: 2020-09-29

## 2020-09-29 NOTE — TELEPHONE ENCOUNTER
Per Dr. Ledesma Message:  Can you please contact her for Tandem? She would like to start on it.   She will also need lab and will drop  license form too.     SMN and Prescription order completed and emailed to Davian Torres Representative.    Left message for patient:  1. Tandem order has been initiated.   2.Schedule lab appt at any local [facility].   3. Follow up scheduled 1/25/2021 per 9/28 My Chart Message  4. Fax or attach to My Chart message DPS form for completion.    Encouraged to return call with questions.    Cora Amos LPN  Diabetes Clinic Coordinator   Adult Endocrinology and Pediatric Specialty Clinics  [facility]

## 2020-10-02 ENCOUNTER — TELEPHONE (OUTPATIENT)
Dept: ENDOCRINOLOGY | Facility: CLINIC | Age: 45
End: 2020-10-02

## 2020-10-02 NOTE — TELEPHONE ENCOUNTER
[facility] Call Center    Phone Message    May a detailed message be left on voicemail: yes     Reason for Call: Form or Letter   Type or form/letter needing completion: As soon as possible  Provider: Dr. Desai  Date form needed: ASAP  Once completed: Fax form to: [phone]     Please call patient and mail back the copy when done      Action Taken: Message routed to:  Adult Clinics: Endocrinology p [phone]    Travel Screening: Not Applicable

## 2020-10-02 NOTE — TELEPHONE ENCOUNTER
Forms received, writer filled out to the best of knowledge. Writer placed in 's folder to review.    Albertina Garcia MA  Adult Endocrinology  [facility]

## 2020-10-05 NOTE — TELEPHONE ENCOUNTER
Insulin-Treated Diabetes Mellitus Report completed and signed by Dr. Lloyd Ledesma.    Faxed to [place] [address] [employer] [address] [employer] [phone].    Original mailed to patient. Copy sent down to scanning.     Marielena Matthew, [facility]  Adult Endocrinology  [facility]

## 2020-10-13 ENCOUNTER — MYC REFILL (OUTPATIENT)
Dept: ENDOCRINOLOGY | Facility: CLINIC | Age: 45
End: 2020-10-13

## 2020-10-13 DIAGNOSIS — E10.9 DIABETES MELLITUS TYPE 1 (H): ICD-10-CM

## 2020-10-14 RX ORDER — PROCHLORPERAZINE 25 MG/1
1 SUPPOSITORY RECTAL SEE ADMIN INSTRUCTIONS
Qty: 9 EACH | Refills: 3 | Status: SHIPPED | OUTPATIENT
Start: 2020-10-14 | End: 2021-10-25

## 2020-10-14 RX ORDER — PROCHLORPERAZINE 25 MG/1
1 SUPPOSITORY RECTAL
Qty: 1 EACH | Refills: 3 | Status: SHIPPED | OUTPATIENT
Start: 2020-10-14 | End: 2021-10-25

## 2020-11-03 ENCOUNTER — MYC MEDICAL ADVICE (OUTPATIENT)
Dept: ENDOCRINOLOGY | Facility: CLINIC | Age: 45
End: 2020-11-03

## 2020-11-03 DIAGNOSIS — E10.9 TYPE 1 DIABETES MELLITUS WITHOUT COMPLICATION (H): Primary | ICD-10-CM

## 2020-11-12 DIAGNOSIS — E10.9 TYPE 1 DIABETES MELLITUS WITHOUT COMPLICATION (H): ICD-10-CM

## 2020-11-12 NOTE — TELEPHONE ENCOUNTER
Will forward to KIRAN Nguyen to review 11/13/2020.    Cora Amos LPN  Diabetes Clinic Coordinator   Adult Endocrinology and Pediatric Specialty Clinics  [facility]

## 2020-11-12 NOTE — TELEPHONE ENCOUNTER
insulin degludec (TRESIBA FLEXTOUCH) 200 UNIT/ML pen        Last Written Prescription Date:  02/10/20  Last Fill Quantity: 15mL,   # refills: 3  Last Office Visit : 09/28/20  Future Office visit:  01/25/21    Routing refill request to provider for review/approval because:  Insulin - refilled per clinic

## 2020-11-16 ENCOUNTER — MEDICAL CORRESPONDENCE (OUTPATIENT)
Dept: HEALTH INFORMATION MANAGEMENT | Facility: CLINIC | Age: 45
End: 2020-11-16

## 2020-11-17 ENCOUNTER — ALLIED HEALTH/NURSE VISIT (OUTPATIENT)
Dept: EDUCATION SERVICES | Facility: CLINIC | Age: 45
End: 2020-11-17
Payer: COMMERCIAL

## 2020-11-17 DIAGNOSIS — E11.9 DIABETES MELLITUS WITHOUT COMPLICATION (H): Primary | ICD-10-CM

## 2020-11-17 PROCEDURE — G0108 DIAB MANAGE TRN  PER INDIV: HCPCS

## 2020-11-17 PROCEDURE — 99207 PR DROP WITH A PROCEDURE: CPT

## 2020-11-18 NOTE — PROGRESS NOTES
"Diabetes Self Management Training: Insulin Pump Start    SUBJECTIVE:  Patient presents for an insulin pump start.   Pump Type: Tandem T Slim X2  Last basal insulin injection: 32 units Tresiba at 4:30pm yesterday.   Last bolus insulin injection: 8 units Novolog at 12:30pm.     OBJECTIVE:  Vitals: There were no vitals taken for this visit.    Blood sugar at beginning of pump start appointment: 235mg/dL  Blood sugar at the end of pump start appointment: 223mg/dL. Pt shown how to enter Bg into pump. Suggested pt enter a value of 175 since she has taken Novoog for lunch about one hour prior to pump training. Pump delivered 1.1units correction dose.       ASSESSMENT / INTERVENTION:  Patient instructed on basic insulin pump topics and insulin pump programming. Pt shown how to sync Dexcom sensor with insulin pump. Pt has been using the Dexcom G6 sensor since 2019.       Insulin pump was programmed according to the Pump Start Orders:  Basal Rate: 12am-12am: 1u/hr  Bolus Ratio: 12am-12am: 1:8  CF: 12am-12am: 50  Target Bam-12am    Control IQ feature turned ON per pt's request. Of note: there is no need to set Temp Basal when Control IQ feature is turned on.   Sleep Activity feature programmed and turned on  Use of Exercise Activity feature explained to pt     Infusion set: AutoSoft 90,  23\" tubing,  9mm needle    Problem Solving: no delivery alarm  High blood sugars and DKA prevention     Education materials provided to patient: pt encouraged to read pump manual. Pump Training Checklist reviewed with pt.     FOLLOW-UP:  Telephone follow-up scheduled in 1-3 days.    Post-pump start follow-up appt scheduled on 12/15/2020 with Saniya.   Appt with Mason on 2021    Time Spent: 75 minutes  Visit Type: Individual    Fatou Arora, RN, BSN, CDE   [facility]        "

## 2020-11-22 ENCOUNTER — HEALTH MAINTENANCE LETTER (OUTPATIENT)
Age: 45
End: 2020-11-22

## 2020-11-27 ENCOUNTER — MYC REFILL (OUTPATIENT)
Dept: FAMILY MEDICINE | Facility: CLINIC | Age: 45
End: 2020-11-27

## 2020-11-27 DIAGNOSIS — F32.0 MILD MAJOR DEPRESSION (H): ICD-10-CM

## 2020-11-27 DIAGNOSIS — F41.1 GAD (GENERALIZED ANXIETY DISORDER): ICD-10-CM

## 2020-11-30 ENCOUNTER — MYC MEDICAL ADVICE (OUTPATIENT)
Dept: FAMILY MEDICINE | Facility: CLINIC | Age: 45
End: 2020-11-30

## 2020-11-30 RX ORDER — CITALOPRAM HYDROBROMIDE 20 MG/1
TABLET ORAL
Qty: 90 TABLET | Refills: 1 | OUTPATIENT
Start: 2020-11-30

## 2020-11-30 RX ORDER — CITALOPRAM HYDROBROMIDE 20 MG/1
TABLET ORAL
Qty: 90 TABLET | Refills: 0 | Status: SHIPPED | OUTPATIENT
Start: 2020-11-30 | End: 2021-02-16

## 2020-11-30 ASSESSMENT — ANXIETY QUESTIONNAIRES
5. BEING SO RESTLESS THAT IT IS HARD TO SIT STILL: NOT AT ALL
2. NOT BEING ABLE TO STOP OR CONTROL WORRYING: NOT AT ALL
1. FEELING NERVOUS, ANXIOUS, OR ON EDGE: NOT AT ALL
7. FEELING AFRAID AS IF SOMETHING AWFUL MIGHT HAPPEN: NOT AT ALL
4. TROUBLE RELAXING: NOT AT ALL
6. BECOMING EASILY ANNOYED OR IRRITABLE: NOT AT ALL
GAD7 TOTAL SCORE: 0
GAD7 TOTAL SCORE: 0
7. FEELING AFRAID AS IF SOMETHING AWFUL MIGHT HAPPEN: NOT AT ALL
3. WORRYING TOO MUCH ABOUT DIFFERENT THINGS: NOT AT ALL
GAD7 TOTAL SCORE: 0

## 2020-11-30 ASSESSMENT — PATIENT HEALTH QUESTIONNAIRE - PHQ9
SUM OF ALL RESPONSES TO PHQ QUESTIONS 1-9: 0
SUM OF ALL RESPONSES TO PHQ QUESTIONS 1-9: 0
10. IF YOU CHECKED OFF ANY PROBLEMS, HOW DIFFICULT HAVE THESE PROBLEMS MADE IT FOR YOU TO DO YOUR WORK, TAKE CARE OF THINGS AT HOME, OR GET ALONG WITH OTHER PEOPLE: NOT DIFFICULT AT ALL

## 2020-12-01 ASSESSMENT — PATIENT HEALTH QUESTIONNAIRE - PHQ9: SUM OF ALL RESPONSES TO PHQ QUESTIONS 1-9: 0

## 2020-12-01 ASSESSMENT — ANXIETY QUESTIONNAIRES: GAD7 TOTAL SCORE: 0

## 2020-12-15 ENCOUNTER — ALLIED HEALTH/NURSE VISIT (OUTPATIENT)
Dept: EDUCATION SERVICES | Facility: CLINIC | Age: 45
End: 2020-12-15
Payer: COMMERCIAL

## 2020-12-15 DIAGNOSIS — E11.9 DIABETES MELLITUS WITHOUT COMPLICATION (H): Primary | ICD-10-CM

## 2020-12-15 DIAGNOSIS — E03.9 HYPOTHYROIDISM, UNSPECIFIED TYPE: ICD-10-CM

## 2020-12-15 DIAGNOSIS — E10.65 TYPE 1 DIABETES MELLITUS WITH HYPERGLYCEMIA (H): ICD-10-CM

## 2020-12-15 LAB
CHOLEST SERPL-MCNC: 175 MG/DL
CREAT SERPL-MCNC: 0.66 MG/DL (ref 0.52–1.04)
CREAT UR-MCNC: 99 MG/DL
GFR SERPL CREATININE-BSD FRML MDRD: >90 ML/MIN/{1.73_M2}
HBA1C MFR BLD: 7.9 % (ref 0–5.6)
HDLC SERPL-MCNC: 73 MG/DL
LDLC SERPL CALC-MCNC: 85 MG/DL
MICROALBUMIN UR-MCNC: 8 MG/L
MICROALBUMIN/CREAT UR: 8.16 MG/G CR (ref 0–25)
NONHDLC SERPL-MCNC: 102 MG/DL
T4 FREE SERPL-MCNC: 1.22 NG/DL (ref 0.76–1.46)
TRIGL SERPL-MCNC: 85 MG/DL
TSH SERPL DL<=0.005 MIU/L-ACNC: 2.07 MU/L (ref 0.4–4)

## 2020-12-15 PROCEDURE — G0108 DIAB MANAGE TRN  PER INDIV: HCPCS

## 2020-12-15 PROCEDURE — 83036 HEMOGLOBIN GLYCOSYLATED A1C: CPT | Performed by: INTERNAL MEDICINE

## 2020-12-15 PROCEDURE — 84443 ASSAY THYROID STIM HORMONE: CPT | Performed by: INTERNAL MEDICINE

## 2020-12-15 PROCEDURE — 80061 LIPID PANEL: CPT | Performed by: INTERNAL MEDICINE

## 2020-12-15 PROCEDURE — 36415 COLL VENOUS BLD VENIPUNCTURE: CPT | Performed by: INTERNAL MEDICINE

## 2020-12-15 PROCEDURE — 82565 ASSAY OF CREATININE: CPT | Performed by: INTERNAL MEDICINE

## 2020-12-15 PROCEDURE — 82043 UR ALBUMIN QUANTITATIVE: CPT | Performed by: INTERNAL MEDICINE

## 2020-12-15 PROCEDURE — 99207 PR DROP WITH A PROCEDURE: CPT

## 2020-12-15 PROCEDURE — 84439 ASSAY OF FREE THYROXINE: CPT | Performed by: INTERNAL MEDICINE

## 2020-12-17 NOTE — PROGRESS NOTES
Diabetes Self Management Training: Follow-up Visit    Brigitte Robles presents today for evaluation of glucose control post insulin pump transition, related to Type 1 diabetes.    She is accompanied by self    Patient's diabetes management related comments/concerns: None at this time.     Patient would like this visit to be focused around the following diabetes-related behaviors and goals:Taking Medication.     ASSESSMENT:  Patient presents to clinic for post insulin pump/glucose sesnor start. Pt transitioned from a Medtronic pump and sensor to a Tandem pump with Dexcom sensor. Pt verbalizes all in all, she is happy with the new pump and sensor.    Current Diabetes Management per Patient:  Taking diabetes medications?  Yes: See below  Diabetes Medication(s)     Diabetic Other       glucagon (GLUCAGON EMERGENCY) 1 MG kit    Inject 1 mg into the muscle once for 1 dose    Insulin       insulin aspart (NOVOLOG FLEXPEN) 100 UNIT/ML pen    USE 1 UNIT PER 10 GM CARB WITH BREAKFAST AND LUNCH AND 1 UNIT PER 8 GM CARB W/DINNER -=50UNITS/DAY     insulin aspart (NOVOLOG VIAL) 100 UNITS/ML vial    Use as directed via insulin pump. Total daily dose approx 80 units.     insulin degludec (TRESIBA FLEXTOUCH) 200 UNIT/ML pen    INJECT 30 UNITS SUBCUTANEOUS DAILY          Do you have any difficulty affording your medications or glucose monitoring supplies? No.    BG RESULTS: Per Pump and Sensor Reports:     SENSOR:   Ave B, which converts to a GMI of 7.7.  54% readings above target, 45% in target, 1% below target.  Pattern noted of nighttime hyperglycemia as well as post-prandial hyperglycemia.     PUMP:  Control IQ - On  Pt compliant with entering carbs into pump when she eats.      BG values are: Not in goal  Patient's most recent   Lab Results   Component Value Date    A1C 7.9 12/15/2020    is not meeting goal of <7.0    Nutrition:  Not discussed today.    Physical Activity:    Not discussed today    Diabetes  "Complications:  Not discussed today.    Recent health service and resource utilization related to diabetes (hyperglycemia, hypoglycemia, etc.):  None    Vitals:  There were no vitals taken for this visit.  Estimated body mass index is 32.67 kg/m  as calculated from the following:    Height as of 6/4/19: 1.651 m (5' 5\").    Weight as of 10/7/19: 89 kg (196 lb 4.8 oz).   Last 3 BP:   BP Readings from Last 3 Encounters:   10/08/19 128/80   10/07/19 137/89   06/10/19 (!) 163/94       History   Smoking Status     Never Smoker   Smokeless Tobacco     Never Used       Labs:  Lab Results   Component Value Date    A1C 7.9 12/15/2020     Lab Results   Component Value Date     07/11/2019     Lab Results   Component Value Date    LDL 85 12/15/2020     HDL Cholesterol   Date Value Ref Range Status   12/15/2020 73 >49 mg/dL Final   ]  GFR Estimate   Date Value Ref Range Status   12/15/2020 >90 >60 mL/min/[1.73_m2] Final     Comment:     Non  GFR Calc  Starting 12/18/2018, serum creatinine based estimated GFR (eGFR) will be   calculated using the Chronic Kidney Disease Epidemiology Collaboration   (CKD-EPI) equation.       GFR Estimate If Black   Date Value Ref Range Status   12/15/2020 >90 >60 mL/min/[1.73_m2] Final     Comment:      GFR Calc  Starting 12/18/2018, serum creatinine based estimated GFR (eGFR) will be   calculated using the Chronic Kidney Disease Epidemiology Collaboration   (CKD-EPI) equation.       Lab Results   Component Value Date    CR 0.66 12/15/2020       Health Beliefs and Attitudes:   Patient Activation Measure Survey Score:  TEDDY Score (Last Two) 3/6/2013   TEDDY Raw Score 39   Activation Score 56.4   TEDDY Level 3       Barriers to Learning: None    INTERVENTION:    Education provided today on:  Taking Medication:   BASAL: 12a-12a: 1u/hr to  12a: 1.1u/hr,  3a:1.0u/hr,  10p: 1.1u/hr  BOLUS: 12a-12a: 1:8 to 12am: 1:8,  3a: 1:7,  10p: 1:7    Opportunities for ongoing " education and support in diabetes-self management were discussed.    Pt verbalized understanding of concepts discussed and recommendations provided today.       Education Materials Provided:  No new materials provided today    PLAN:  Keep retutn appt with Endo in Jan.     FOLLOW-UP:  Ongoing plan for education and support: As needed.     Time Spent: 45 minutes  Encounter Type: Individual    Any diabetes medication dose changes were made via the CDE Protocol and Collaborative Practice Agreement with the patient's endocrinology provider. A copy of this encounter was shared with the provider.    Brigitte Robles comes into clinic today at the request of Dr Desai, Ordering Provider for Pt Teaching and review of bg readings.     This service provided today was under the supervising provider of the day Dr Luu, who was available if needed.      Fatou Arora, RN, BSN, CDE   [facility]

## 2021-01-07 ENCOUNTER — MEDICAL CORRESPONDENCE (OUTPATIENT)
Dept: HEALTH INFORMATION MANAGEMENT | Facility: CLINIC | Age: 46
End: 2021-01-07

## 2021-01-15 ENCOUNTER — HEALTH MAINTENANCE LETTER (OUTPATIENT)
Age: 46
End: 2021-01-15

## 2021-01-25 ENCOUNTER — VIRTUAL VISIT (OUTPATIENT)
Dept: ENDOCRINOLOGY | Facility: CLINIC | Age: 46
End: 2021-01-25
Payer: COMMERCIAL

## 2021-01-25 DIAGNOSIS — E06.3 HYPOTHYROIDISM DUE TO HASHIMOTO'S THYROIDITIS: ICD-10-CM

## 2021-01-25 PROCEDURE — 99213 OFFICE O/P EST LOW 20 MIN: CPT | Mod: 95 | Performed by: INTERNAL MEDICINE

## 2021-01-25 RX ORDER — LEVOTHYROXINE SODIUM 125 MCG
TABLET ORAL
Qty: 90 TABLET | Refills: 3 | Status: SHIPPED | OUTPATIENT
Start: 2021-01-25 | End: 2022-02-07

## 2021-01-25 NOTE — PATIENT INSTRUCTIONS
- return in 3 months    If you have any questions, please do not hesitate to call [facility] at [phone] and ask for Endocrinology clinic.    Sincerely,    Lloyd Ledesma MD  Endocrinology

## 2021-01-25 NOTE — PROGRESS NOTES
Endocrinology Note         Brigitte is a 45 year old female who has VDO visit for type 1 diabetes.    HPI  Ms Brigitte Robles is a 45 years old female who has VDO visit for type 1 diabetes. Last seen 9/2020.    She was noted to have increased blurred vision and fatigue in February 2016 and noted to have blood glucose of 300s and A1C of 7.0%. She was initially diagnosed with type 2 diabetes and was started on metformin 500 mg bid which did not control her blood glucose. She was then seen by her PCP who ran more blood test and noted for low c-peptide and positive MONTSERRAT ab and IA2 antibody. She was then started on insulin since.    Interval history:  She switched to Tandem and Dexcom about a month ago. She is very pleased with the change.    1) type 1 diabetes:  A1c is 7.9% (12/2020). Her pattern is postprandial hyperglycemia after lunch and after bedtime. She has not had much of hypoglycemia anymore after switching to the pump.    Reviewed Dexcom and Tandem below        Pump setting      2) hypothyroidism: She has history of hypothyroidism for the past 11 years. It was diagnosed about 6 months postpartum. She is currently on Synthroid 125 mcg daily for 6 days and 1/2 pill for 1 day per week.   Lab on 12/15/2020 showed TSH 2.07, FT4 1.22.    Past Medical History  Type 1 diabetes  Hypothyroidism    Allergies  Allergies   Allergen Reactions     Levothyroxine      Dizzy to generic --- not to synthroid     Medications  Current Outpatient Medications   Medication Sig Dispense Refill     citalopram (CELEXA) 20 MG tablet TAKE 1 TABLET BY MOUTH EVERY DAY 90 tablet 0     Continuous Blood Gluc  (DEXCOM G6 ) MAGGY 1 Device continuous 1 Device 0     Continuous Blood Gluc Sensor (DEXCOM G6 SENSOR) MISC 1 each See Admin Instructions Change sensor every 10 days. 9 each 3     Continuous Blood Gluc Transmit (DEXCOM G6 TRANSMITTER) MISC 1 each every 3 months 1 each 3     insulin aspart (NOVOLOG VIAL) 100 UNITS/ML vial Use  as directed via insulin pump. Total daily dose approx 80 units. 80 mL 3     INSULIN PUMP - OUTPATIENT Date Last Updated: 1/25/2021  Tandem  Pump Website Username: [email or web address]  Pump Website Password: [email or web address]       SYNTHROID 125 MCG tablet Synthroid 125 mcg,  1 pill for 6-day and half a pill 1 day a week per last note 2/10/20 90 tablet 3     blood glucose monitoring (ONETOUCH VERIO IQ) test strip Use to test blood sugar 10 times daily or as directed. (Patient not taking: Reported on 9/28/2020) 900 strip 3     glucagon (GLUCAGON EMERGENCY) 1 MG kit Inject 1 mg into the muscle once for 1 dose 1 mg 0     insulin aspart (NOVOLOG FLEXPEN) 100 UNIT/ML pen USE 1 UNIT PER 10 GM CARB WITH BREAKFAST AND LUNCH AND 1 UNIT PER 8 GM CARB W/DINNER -=50UNITS/DAY (Patient not taking: Reported on 1/25/2021) 45 mL 3     insulin degludec (TRESIBA FLEXTOUCH) 200 UNIT/ML pen INJECT 30 UNITS SUBCUTANEOUS DAILY (Patient not taking: Reported on 1/25/2021) 15 mL 3     insulin pen needle (B-D U/F) 31G X 5 MM miscellaneous USE 4 DAILY OR AS DIRECTED. (Patient not taking: Reported on 1/25/2021) 400 each 3     ONETOUCH DELICA LANCETS 33G MISC 1 Box 6 times daily (Patient not taking: Reported on 1/25/2021) 100 each 12     Family History  Type 1 diabetes in her father, mother, paternal aunt, paternal uncle, brother and sister  Type 2 diabetes in her grandmother  Heart in her maternal grandfather and maternal grandmother  hypothyroid in her brother, father, maternal grandfather, maternal grandmother, mother, and sister.     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    ROS  10 points ROS were negative otherwise mentioned in HPI    Physical Exam  Limited due to virtual visit  Constitutional: no distress, comfortable, pleasant     RESULTS    Lab Results   Component Value Date    A1C 7.9 12/15/2020    A1C 7.7 06/23/2020    A1C 7.4 10/07/2019    A1C 7.2  06/10/2019    A1C 7.5 02/04/2019       ENDO DIABETES Latest Ref Rng & Units 12/15/2020   CHOLESTEROL <200 mg/dL 175   LDL CHOLESTEROL, CALCULATED <100 mg/dL 85   LDL CHOLESTEROL DIRECT <100 mg/dL    HDL CHOLESTEROL >49 mg/dL 73   VLDL-CHOLESTEROL 0 - 30 mg/dL    NON HDL CHOLESTEROL <130 mg/dL 102   TRIGLYCERIDES <150 mg/dL 85   ALBUMIN URINE MG/L mg/L 8   ALBUMIN URINE MG/G CR 0 - 25 mg/g Cr 8.16   CREATININE 0.52 - 1.04 mg/dL 0.66      Ref. Range 2/5/2016 11:41   Glutamic Acid Decarboxylase Antibody Unknown 45.7 (H)   IA-2 Antibody Unknown 3.4 (H)   Islet Cell Antibody IgG Unknown <1:4...      Ref. Range 2/10/2016 08:35   C-Peptide Latest Ref Range: 0.9-6.9 ng/mL 0.7 (L)       ASSESSMENT:    Ms Brigitte Robles is a 45 years old female who has VDO visit for follow up type 1 diabetes and hypothyroidism    1) type 1 diabetes: diagnosed type 1 diabetes in Feb 2016. A1c was 7.9% in Nov 2020. She usually has postprandial hyperglycemia and nocturnal hyperglycemia    Plan:  - change setting to  MN 1.1 unit/hr  3 AM 1.3 unit/hr  9 PM 1.2 unit/hr    - ICR  MN: 1 unit per 8 gram  3 AM: 1 unit per 7 gram  9 PM: 1 unit per 8 gram    - correction factor of 1 unit per 50 above 120 mg/dl.  - continue using Dexcom    2) Hypothyroidism:clinically euthyroid. Lab returned normal in Nov 2020. Continue Synthroid 125 mcg x6 days per week and 1/2 pill for 1 day per week.    3) Obesity: BMI 34. Encourage to continue on healthy diet and exercise.    PLAN:   - change basal rate as above  - continue current dose of Synthroid  - RTC 4 months     VDO start time: 0220 PM  VDO end time: 0232 PM  VDO duration: 12 minutes    External notes/medical records independently reviewed, labs and imaging independently reviewed, medical management and tests to be discussed/communicated to patient.    Time: I spent 23 minutes on the date of the encounter preparing to see patient (including chart review and preparation), obtaining and or reviewing additional  medical history, performing a physical exam and evaluation, documenting clinical information in the electronic health record, independently interpreting results, communicating results to the patient and coordinating care.    Lloyd Ledesma MD  Endocrinology  [phone]

## 2021-01-25 NOTE — LETTER
1/25/2021         RE: Brigitte Robles  [address] [facility] [place] [address]        Dear Colleague,    Thank you for referring your patient, Brigitte Robles, to the [facility]. Please see a copy of my visit note below.    Brigitte is a 45 year old who is being evaluated via a billable video visit.      How would you like to obtain your AVS? MyChart  If the video visit is dropped, the invitation should be resent by: Text to cell phone: [phone]  Will anyone else be joining your video visit? No    Marielena Matthew, [facility]  Adult Endocrinology  [facility], [place]          Endocrinology Note         Brigitte is a 45 year old female who has VDO visit for type 1 diabetes.    HPI  Ms Brigitte Robles is a 45 years old female who has VDO visit for type 1 diabetes. Last seen 9/2020.    She was noted to have increased blurred vision and fatigue in February 2016 and noted to have blood glucose of 300s and A1C of 7.0%. She was initially diagnosed with type 2 diabetes and was started on metformin 500 mg bid which did not control her blood glucose. She was then seen by her PCP who ran more blood test and noted for low c-peptide and positive MONTSERRAT ab and IA2 antibody. She was then started on insulin since.    Interval history:  She switched to Tandem and Dexcom about a month ago. She is very pleased with the change.    1) type 1 diabetes:  A1c is 7.9% (12/2020). Her pattern is postprandial hyperglycemia after lunch and after bedtime. She has not had much of hypoglycemia anymore after switching to the pump.    Reviewed Dexcom and Tandem below        Pump setting      2) hypothyroidism: She has history of hypothyroidism for the past 11 years. It was diagnosed about 6 months postpartum. She is currently on Synthroid 125 mcg daily for 6 days and 1/2 pill for 1 day per week.   Lab on 12/15/2020 showed TSH 2.07, FT4 1.22.    Past Medical History  Type 1  diabetes  Hypothyroidism    Allergies  Allergies   Allergen Reactions     Levothyroxine      Dizzy to generic --- not to synthroid     Medications  Current Outpatient Medications   Medication Sig Dispense Refill     citalopram (CELEXA) 20 MG tablet TAKE 1 TABLET BY MOUTH EVERY DAY 90 tablet 0     Continuous Blood Gluc  (DEXCOM G6 ) MAGGY 1 Device continuous 1 Device 0     Continuous Blood Gluc Sensor (DEXCOM G6 SENSOR) MISC 1 each See Admin Instructions Change sensor every 10 days. 9 each 3     Continuous Blood Gluc Transmit (DEXCOM G6 TRANSMITTER) MISC 1 each every 3 months 1 each 3     insulin aspart (NOVOLOG VIAL) 100 UNITS/ML vial Use as directed via insulin pump. Total daily dose approx 80 units. 80 mL 3     INSULIN PUMP - OUTPATIENT Date Last Updated: 1/25/2021  Tandem  Pump Website Username: [email or web address]  Pump Website Password: [email or web address]       SYNTHROID 125 MCG tablet Synthroid 125 mcg,  1 pill for 6-day and half a pill 1 day a week per last note 2/10/20 90 tablet 3     blood glucose monitoring (ONETOUCH VERIO IQ) test strip Use to test blood sugar 10 times daily or as directed. (Patient not taking: Reported on 9/28/2020) 900 strip 3     glucagon (GLUCAGON EMERGENCY) 1 MG kit Inject 1 mg into the muscle once for 1 dose 1 mg 0     insulin aspart (NOVOLOG FLEXPEN) 100 UNIT/ML pen USE 1 UNIT PER 10 GM CARB WITH BREAKFAST AND LUNCH AND 1 UNIT PER 8 GM CARB W/DINNER -=50UNITS/DAY (Patient not taking: Reported on 1/25/2021) 45 mL 3     insulin degludec (TRESIBA FLEXTOUCH) 200 UNIT/ML pen INJECT 30 UNITS SUBCUTANEOUS DAILY (Patient not taking: Reported on 1/25/2021) 15 mL 3     insulin pen needle (B-D U/F) 31G X 5 MM miscellaneous USE 4 DAILY OR AS DIRECTED. (Patient not taking: Reported on 1/25/2021) 400 each 3     ONETOUCH DELICA LANCETS 33G MISC 1 Box 6 times daily (Patient not taking: Reported on 1/25/2021) 100 each 12     Family History  Type 1 diabetes in her father,  mother, paternal aunt, paternal uncle, brother and sister  Type 2 diabetes in her grandmother  Heart in her maternal grandfather and maternal grandmother  hypothyroid in her brother, father, maternal grandfather, maternal grandmother, mother, and sister.     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    ROS  10 points ROS were negative otherwise mentioned in HPI    Physical Exam  Limited due to virtual visit  Constitutional: no distress, comfortable, pleasant     RESULTS    Lab Results   Component Value Date    A1C 7.9 12/15/2020    A1C 7.7 06/23/2020    A1C 7.4 10/07/2019    A1C 7.2 06/10/2019    A1C 7.5 02/04/2019       ENDO DIABETES Latest Ref Rng & Units 12/15/2020   CHOLESTEROL <200 mg/dL 175   LDL CHOLESTEROL, CALCULATED <100 mg/dL 85   LDL CHOLESTEROL DIRECT <100 mg/dL    HDL CHOLESTEROL >49 mg/dL 73   VLDL-CHOLESTEROL 0 - 30 mg/dL    NON HDL CHOLESTEROL <130 mg/dL 102   TRIGLYCERIDES <150 mg/dL 85   ALBUMIN URINE MG/L mg/L 8   ALBUMIN URINE MG/G CR 0 - 25 mg/g Cr 8.16   CREATININE 0.52 - 1.04 mg/dL 0.66      Ref. Range 2/5/2016 11:41   Glutamic Acid Decarboxylase Antibody Unknown 45.7 (H)   IA-2 Antibody Unknown 3.4 (H)   Islet Cell Antibody IgG Unknown <1:4...      Ref. Range 2/10/2016 08:35   C-Peptide Latest Ref Range: 0.9-6.9 ng/mL 0.7 (L)       ASSESSMENT:    Ms Brigitte Robles is a 45 years old female who has VDO visit for follow up type 1 diabetes and hypothyroidism    1) type 1 diabetes: diagnosed type 1 diabetes in Feb 2016. A1c was 7.9% in Nov 2020. She usually has postprandial hyperglycemia and nocturnal hyperglycemia    Plan:  - change setting to  MN 1.1 unit/hr  3 AM 1.3 unit/hr  9 PM 1.2 unit/hr    - ICR  MN: 1 unit per 8 gram  3 AM: 1 unit per 7 gram  9 PM: 1 unit per 8 gram    - correction factor of 1 unit per 50 above 120 mg/dl.  - continue using Dexcom    2) Hypothyroidism:clinically euthyroid. Lab returned normal in Nov  2020. Continue Synthroid 125 mcg x6 days per week and 1/2 pill for 1 day per week.    3) Obesity: BMI 34. Encourage to continue on healthy diet and exercise.    PLAN:   - change basal rate as above  - continue current dose of Synthroid  - RTC 4 months     VDO start time: 0220 PM  VDO end time: 0232 PM  VDO duration: 12 minutes    External notes/medical records independently reviewed, labs and imaging independently reviewed, medical management and tests to be discussed/communicated to patient.    Time: I spent 23 minutes preparing to see patient (including chart review and preparation), obtaining and or reviewing additional medical history, performing a physical exam and evaluation, documenting clinical information in the electronic health record, independently interpreting results, communicating results to the patient and coordinating care.    Lloyd Ledesma MD  Endocrinology  [phone]

## 2021-01-25 NOTE — PROGRESS NOTES
Brigitte is a 45 year old who is being evaluated via a billable video visit.      How would you like to obtain your AVS? MyChart  If the video visit is dropped, the invitation should be resent by: Text to cell phone: [phone]  Will anyone else be joining your video visit? No    Marielena Matthew CMA  Adult Endocrinology  [facility]

## 2021-02-14 DIAGNOSIS — F41.1 GAD (GENERALIZED ANXIETY DISORDER): ICD-10-CM

## 2021-02-14 DIAGNOSIS — F32.0 MILD MAJOR DEPRESSION (H): ICD-10-CM

## 2021-02-16 ENCOUNTER — TELEPHONE (OUTPATIENT)
Dept: FAMILY MEDICINE | Facility: CLINIC | Age: 46
End: 2021-02-16

## 2021-02-16 DIAGNOSIS — F41.1 GAD (GENERALIZED ANXIETY DISORDER): ICD-10-CM

## 2021-02-16 DIAGNOSIS — F32.0 MILD MAJOR DEPRESSION (H): ICD-10-CM

## 2021-02-16 RX ORDER — CITALOPRAM HYDROBROMIDE 20 MG/1
TABLET ORAL
Qty: 30 TABLET | Refills: 0 | Status: SHIPPED | OUTPATIENT
Start: 2021-02-16 | End: 2021-02-22

## 2021-02-16 NOTE — TELEPHONE ENCOUNTER
Routing refill request to provider for review/approval because:  Patient needs to be seen because:  Overdue MONTSERRAT, depression, office visit.  Tamiko Jarrell RN

## 2021-02-16 NOTE — TELEPHONE ENCOUNTER
30 day prescription sent to pharmacy, please have her schedule an appointment, this medication check can be combined with a routine exam since she is due for that also.   Kristen Kehr PA-C

## 2021-02-17 RX ORDER — CITALOPRAM HYDROBROMIDE 20 MG/1
20 TABLET ORAL DAILY
Qty: 90 TABLET | Refills: 0 | OUTPATIENT
Start: 2021-02-17

## 2021-03-01 ENCOUNTER — VIRTUAL VISIT (OUTPATIENT)
Dept: FAMILY MEDICINE | Facility: CLINIC | Age: 46
End: 2021-03-01
Payer: COMMERCIAL

## 2021-03-01 DIAGNOSIS — F32.0 MILD MAJOR DEPRESSION (H): ICD-10-CM

## 2021-03-01 DIAGNOSIS — F41.1 GAD (GENERALIZED ANXIETY DISORDER): ICD-10-CM

## 2021-03-01 PROCEDURE — 99213 OFFICE O/P EST LOW 20 MIN: CPT | Mod: 95 | Performed by: PHYSICIAN ASSISTANT

## 2021-03-01 PROCEDURE — 96127 BRIEF EMOTIONAL/BEHAV ASSMT: CPT | Performed by: PHYSICIAN ASSISTANT

## 2021-03-01 RX ORDER — CITALOPRAM HYDROBROMIDE 20 MG/1
20 TABLET ORAL DAILY
Qty: 90 TABLET | Refills: 1 | Status: SHIPPED | OUTPATIENT
Start: 2021-03-01 | End: 2021-08-19

## 2021-03-01 ASSESSMENT — PATIENT HEALTH QUESTIONNAIRE - PHQ9
SUM OF ALL RESPONSES TO PHQ QUESTIONS 1-9: 0
5. POOR APPETITE OR OVEREATING: NOT AT ALL

## 2021-03-01 ASSESSMENT — ANXIETY QUESTIONNAIRES

## 2021-03-01 NOTE — PROGRESS NOTES
Brigitte is a 45 year old who is being evaluated via a billable video visit.      How would you like to obtain your AVS? MyChart  If the video visit is dropped, the invitation should be resent by: Text to cell phone: [phone]  Will anyone else be joining your video visit? No    Video Start Time: 11:45 AM    Assessment & Plan     MONTSERRAT (generalized anxiety disorder)  Mild major depression (H)  She is doing well with the citalopram and will continue.   Plan follow up in 6 months.   Encouraged healthy lifestyle habit with diet and exercise also to help with moods.   She mentioned that she is having some night sweats around the week prior to menses. She declines starting oral contraception for management. She plans to try over the counter supplements if needed.   - citalopram (CELEXA) 20 MG tablet; Take 1 tablet (20 mg) by mouth daily                 Return in about 6 months (around 9/1/2021) for depression / anxiety.    Kristen M. Kehr, PA-C  [facility]   Brigitte is a 45 year old who presents for the following health issues     HPI       Depression and Anxiety Follow-Up    How are you doing with your depression since your last visit? Improved, doing well with the citalopram.     How are you doing with your anxiety since your last visit?  Improved     Are you having other symptoms that might be associated with depression or anxiety? No    Have you had a significant life event? OTHER: grandmother passed away,  no issues.      Do you have any concerns with your use of alcohol or other drugs? No    Social History     Tobacco Use     Smoking status: Never Smoker     Smokeless tobacco: Never Used   Substance Use Topics     Alcohol use: Yes     Comment: rare     Drug use: No     PHQ 5/28/2020 11/30/2020 3/1/2021   PHQ-9 Total Score 0 0 0   Q9: Thoughts of better off dead/self-harm past 2 weeks Not at all Not at all Not at all   F/U: Thoughts of suicide or self-harm - - -   F/U: Self harm-plan  - - -   F/U: Self-harm action - - -   F/U: Safety concerns - - -     MONTSERRAT-7 SCORE 5/28/2020 11/30/2020 3/1/2021   Total Score - - -   Total Score - 0 (minimal anxiety) -   Total Score 1 0 0     Last PHQ-9 3/1/2021   1.  Little interest or pleasure in doing things 0   2.  Feeling down, depressed, or hopeless 0   3.  Trouble falling or staying asleep, or sleeping too much 0   4.  Feeling tired or having little energy 0   5.  Poor appetite or overeating 0   6.  Feeling bad about yourself 0   7.  Trouble concentrating 0   8.  Moving slowly or restless 0   Q9: Thoughts of better off dead/self-harm past 2 weeks 0   PHQ-9 Total Score 0   Difficulty at work, home, or with people Not difficult at all   In the past two weeks have you had thoughts of suicide or self harm? -   Do you have concerns about your personal safety or the safety of others? -   In the past 2 weeks have you thought about a plan or had intention to harm yourself? -   In the past 2 weeks have you acted on these thoughts in any way? -     MONTSERRAT-7  3/1/2021   1. Feeling nervous, anxious, or on edge 0   2. Not being able to stop or control worrying 0   3. Worrying too much about different things 0   4. Trouble relaxing 0   5. Being so restless that it is hard to sit still 0   6. Becoming easily annoyed or irritable 0   7. Feeling afraid, as if something awful might happen 0   MONTSERRAT-7 Total Score 0   If you checked any problems, how difficult have they made it for you to do your work, take care of things at home, or get along with other people? Not difficult at all       Suicide Assessment Five-step Evaluation and Treatment (SAFE-T)      How many servings of fruits and vegetables do you eat daily?  4 or more    On average, how many sweetened beverages do you drink each day (Examples: soda, juice, sweet tea, etc.  Do NOT count diet or artificially sweetened beverages)?   0    How many days per week do you exercise enough to make your heart beat faster? 5    How many  minutes a day do you exercise enough to make your heart beat faster? 30 - 60    How many days per week do you miss taking your medication? 0        Review of Systems   Constitutional, HEENT, cardiovascular, pulmonary, GI, , musculoskeletal, neuro, skin, endocrine and psych systems are negative, except as otherwise noted.      Objective           Vitals:  No vitals were obtained today due to virtual visit.    Physical Exam   GENERAL: Healthy, alert and no distress  EYES: Eyes grossly normal to inspection.  No discharge or erythema, or obvious scleral/conjunctival abnormalities.  RESP: No audible wheeze, cough, or visible cyanosis.  No visible retractions or increased work of breathing.    SKIN: Visible skin clear. No significant rash, abnormal pigmentation or lesions.  NEURO: Cranial nerves grossly intact.  Mentation and speech appropriate for age.  PSYCH: Mentation appears normal, affect normal/bright, judgement and insight intact, normal speech and appearance well-groomed.                Video-Visit Details    Type of service:  Video Visit    Video End Time:11:54 AM    Originating Location (pt. Location): Other car    Distant Location (provider location):  [facility]     Platform used for Video Visit: SOHM

## 2021-03-02 ASSESSMENT — ANXIETY QUESTIONNAIRES: GAD7 TOTAL SCORE: 0

## 2021-04-26 ENCOUNTER — OFFICE VISIT (OUTPATIENT)
Dept: ENDOCRINOLOGY | Facility: CLINIC | Age: 46
End: 2021-04-26
Payer: COMMERCIAL

## 2021-04-26 VITALS
OXYGEN SATURATION: 95 % | HEART RATE: 79 BPM | SYSTOLIC BLOOD PRESSURE: 144 MMHG | BODY MASS INDEX: 37.33 KG/M2 | DIASTOLIC BLOOD PRESSURE: 91 MMHG | WEIGHT: 224.3 LBS

## 2021-04-26 DIAGNOSIS — E10.65 TYPE 1 DIABETES MELLITUS WITH HYPERGLYCEMIA (H): ICD-10-CM

## 2021-04-26 LAB — HBA1C MFR BLD: 7.2 % (ref 0–5.6)

## 2021-04-26 PROCEDURE — 83036 HEMOGLOBIN GLYCOSYLATED A1C: CPT | Performed by: INTERNAL MEDICINE

## 2021-04-26 PROCEDURE — 99214 OFFICE O/P EST MOD 30 MIN: CPT | Performed by: INTERNAL MEDICINE

## 2021-04-26 NOTE — PATIENT INSTRUCTIONS
- 6 months follow up    If you have any questions, please do not hesitate to call [facility] at [phone] and ask for Endocrinology clinic.    Sincerely,    Lloyd Ledesma MD  Endocrinology

## 2021-04-26 NOTE — LETTER
4/26/2021         RE: Brigitte Robles  [address] [facility] [address]        Dear Colleague,    Thank you for referring your patient, Brigitte Robles, to the [facility]. Please see a copy of my visit note below.        Endocrinology Note         Brigitte is a 45 year old female who has VDO visit for type 1 diabetes.    HPI  Ms Brigitte Robles is a 45 years old female who has VDO visit for type 1 diabetes. Last seen 12/2020.    She was noted to have increased blurred vision and fatigue in February 2016 and noted to have blood glucose of 300s and A1C of 7.0%. She was initially diagnosed with type 2 diabetes and was started on metformin 500 mg bid which did not control her blood glucose. She was then seen by her PCP who ran more blood test and noted for low c-peptide and positive MONTSERRAT ab and IA2 antibody. She was then started on insulin since.    Interval history:  She is currently using Tandem and Dexcom and is very pleased with it.    1) type 1 diabetes:  A1c is 7.2% (12/2020). Her pattern is postprandial hyperglycemia after lunch and after bedtime. She has not had much of hypoglycemia anymore after switching to the pump.    Reviewed Dexcom and Tandem  Average glucose 174 (lowest 43, highest 400). She is using Control IQ.   Basal 38.07 unit(s)/day (58%). Bolus 28.14 unit/day (43%). Average total daily dose 66.2.     Pump setting      2) hypothyroidism: She has history of hypothyroidism for the past 11 years. It was diagnosed about 6 months postpartum. She is currently on Synthroid 125 mcg daily for 6 days and 1/2 pill for 1 day per week.   Lab on 12/15/2020 showed TSH 2.07, FT4 1.22.    Past Medical History  Type 1 diabetes  Hypothyroidism    Allergies  Allergies   Allergen Reactions     Levothyroxine      Dizzy to generic --- not to synthroid     Medications  Current Outpatient Medications   Medication Sig Dispense Refill     citalopram (CELEXA) 20 MG tablet Take 1  tablet (20 mg) by mouth daily 90 tablet 1     Continuous Blood Gluc  (DEXCOM G6 ) MAGGY 1 Device continuous 1 Device 0     Continuous Blood Gluc Sensor (DEXCOM G6 SENSOR) MISC 1 each See Admin Instructions Change sensor every 10 days. 9 each 3     Continuous Blood Gluc Transmit (DEXCOM G6 TRANSMITTER) MISC 1 each every 3 months 1 each 3     insulin aspart (NOVOLOG VIAL) 100 UNITS/ML vial Use as directed via insulin pump. Total daily dose approx 80 units. 80 mL 3     INSULIN PUMP - OUTPATIENT Date Last Updated: 1/25/2021  Tandem  Pump Website Username: [email or web address]  Pump Website Password: [email or web address]       SYNTHROID 125 MCG tablet Synthroid 125 mcg,  1 pill for 6-day and half a pill 1 day a week 90 tablet 3     blood glucose monitoring (ONETOUCH VERIO IQ) test strip Use to test blood sugar 10 times daily or as directed. (Patient not taking: Reported on 9/28/2020) 900 strip 3     glucagon (GLUCAGON EMERGENCY) 1 MG kit Inject 1 mg into the muscle once for 1 dose 1 mg 0     insulin aspart (NOVOLOG FLEXPEN) 100 UNIT/ML pen USE 1 UNIT PER 10 GM CARB WITH BREAKFAST AND LUNCH AND 1 UNIT PER 8 GM CARB W/DINNER -=50UNITS/DAY (Patient not taking: Reported on 1/25/2021) 45 mL 3     insulin degludec (TRESIBA FLEXTOUCH) 200 UNIT/ML pen INJECT 30 UNITS SUBCUTANEOUS DAILY (Patient not taking: Reported on 1/25/2021) 15 mL 3     insulin pen needle (B-D U/F) 31G X 5 MM miscellaneous USE 4 DAILY OR AS DIRECTED. (Patient not taking: Reported on 1/25/2021) 400 each 3     ONETOUCH DELICA LANCETS 33G MISC 1 Box 6 times daily (Patient not taking: Reported on 1/25/2021) 100 each 12     Family History  Type 1 diabetes in her father, mother, paternal aunt, paternal uncle, brother and sister  Type 2 diabetes in her grandmother  Heart in her maternal grandfather and maternal grandmother  hypothyroid in her brother, father, maternal grandfather, maternal grandmother, mother, and sister.     Social History  No  "smoking  Drink alcohol occasionally  No illicit drug  , has 2 children  Works in the lab    ROS  10 points ROS were negative otherwise mentioned in HPI    Physical Exam  Vital signs:   BP (!) 144/91 (BP Location: Left arm, Patient Position: Sitting, Cuff Size: Adult Large)   Pulse 79   Wt 101.7 kg (224 lb 4.8 oz)   SpO2 95%   BMI 37.33 kg/m    Estimated body mass index is 37.33 kg/m  as calculated from the following:    Height as of 6/4/19: 1.651 m (5' 5\").    Weight as of this encounter: 101.7 kg (224 lb 4.8 oz).  Constitutional: no distress, comfortable, pleasant   Eyes: anicteric  Musculoskeletal: no edema   Skin: no jaundice   Neurological: normal gait, intact sensation per monofilament test bilaterally (exam 2/4/19), no tremor  Psychological: appropriate mood     RESULTS    Lab Results   Component Value Date    A1C 7.2 04/26/2021    A1C 7.9 12/15/2020    A1C 7.7 06/23/2020    A1C 7.4 10/07/2019    A1C 7.2 06/10/2019       ENDO DIABETES Latest Ref Rng & Units 12/15/2020   CHOLESTEROL <200 mg/dL 175   LDL CHOLESTEROL, CALCULATED <100 mg/dL 85   LDL CHOLESTEROL DIRECT <100 mg/dL    HDL CHOLESTEROL >49 mg/dL 73   VLDL-CHOLESTEROL 0 - 30 mg/dL    NON HDL CHOLESTEROL <130 mg/dL 102   TRIGLYCERIDES <150 mg/dL 85   ALBUMIN URINE MG/L mg/L 8   ALBUMIN URINE MG/G CR 0 - 25 mg/g Cr 8.16   CREATININE 0.52 - 1.04 mg/dL 0.66      Ref. Range 2/5/2016 11:41   Glutamic Acid Decarboxylase Antibody Unknown 45.7 (H)   IA-2 Antibody Unknown 3.4 (H)   Islet Cell Antibody IgG Unknown <1:4...      Ref. Range 2/10/2016 08:35   C-Peptide Latest Ref Range: 0.9-6.9 ng/mL 0.7 (L)       ASSESSMENT:    Ms Brigitte Robles is a 45 years old female who has VDO visit for follow up type 1 diabetes and hypothyroidism    1) type 1 diabetes: diagnosed type 1 diabetes in Feb 2016. A1c is 7.2% today. She usually has postprandial hyperglycemia. No more hypoglycemia.     Plan:  - change sensitivity  - basal   MN 1.1 unit/hr  3 AM 1.3 " unit/hr  9 PM 1.2 unit/hr    - ICR  MN: 1 unit per 8 gram  3 AM: 1 unit per 7 gram  9 PM: 1 unit per 8 gram    - correction factor of 1 unit per 45 above 110 mg/dl at 3 am and 10 pm  - continue using Dexcom    2) Hypothyroidism:clinically euthyroid. Lab returned normal in Nov 2020. Continue Synthroid 125 mcg x6 days per week and 1/2 pill for 1 day per week.    3) Obesity: BMI 34. Encourage to continue on healthy diet and exercise. She is currently trying Weight Watcher    PLAN:   - change basal rate as above  - continue current dose of Synthroid  - RTC 6 months     External notes/medical records independently reviewed, labs and imaging independently reviewed, medical management and tests to be discussed/communicated to patient.    Time: I spent minutes on the date of the encounter preparing to see patient (including chart review and preparation), obtaining and or reviewing additional medical history, performing a physical exam and evaluation, documenting clinical information in the electronic health record, independently interpreting results, communicating results to the patient and coordinating care.    Lloyd Ledesma MD  Endocrinology  [phone]        Again, thank you for allowing me to participate in the care of your patient.        Sincerely,        Lloyd Ledesma MD

## 2021-04-26 NOTE — PROGRESS NOTES
Endocrinology Note         Brigitte is a 45 year old female who has VDO visit for type 1 diabetes.    HPI  Ms Brigitte Robles is a 45 years old female who has VDO visit for type 1 diabetes. Last seen 12/2020.    She was noted to have increased blurred vision and fatigue in February 2016 and noted to have blood glucose of 300s and A1C of 7.0%. She was initially diagnosed with type 2 diabetes and was started on metformin 500 mg bid which did not control her blood glucose. She was then seen by her PCP who ran more blood test and noted for low c-peptide and positive MONTSERRAT ab and IA2 antibody. She was then started on insulin since.    Interval history:  She is currently using Tandem and Dexcom and is very pleased with it.    1) type 1 diabetes:  A1c is 7.2% (12/2020). Her pattern is postprandial hyperglycemia after lunch and after bedtime. She has not had much of hypoglycemia anymore after switching to the pump.    Reviewed Dexcom and Tandem  Average glucose 174 (lowest 43, highest 400). She is using Control IQ.   Basal 38.07 unit(s)/day (58%). Bolus 28.14 unit/day (43%). Average total daily dose 66.2.     Pump setting      2) hypothyroidism: She has history of hypothyroidism for the past 11 years. It was diagnosed about 6 months postpartum. She is currently on Synthroid 125 mcg daily for 6 days and 1/2 pill for 1 day per week.   Lab on 12/15/2020 showed TSH 2.07, FT4 1.22.    Past Medical History  Type 1 diabetes  Hypothyroidism    Allergies  Allergies   Allergen Reactions     Levothyroxine      Dizzy to generic --- not to synthroid     Medications  Current Outpatient Medications   Medication Sig Dispense Refill     citalopram (CELEXA) 20 MG tablet Take 1 tablet (20 mg) by mouth daily 90 tablet 1     Continuous Blood Gluc  (DEXCOM G6 ) MAGGY 1 Device continuous 1 Device 0     Continuous Blood Gluc Sensor (DEXCOM G6 SENSOR) MISC 1 each See Admin Instructions Change sensor every 10 days. 9 each 3      Continuous Blood Gluc Transmit (DEXCOM G6 TRANSMITTER) MISC 1 each every 3 months 1 each 3     insulin aspart (NOVOLOG VIAL) 100 UNITS/ML vial Use as directed via insulin pump. Total daily dose approx 80 units. 80 mL 3     INSULIN PUMP - OUTPATIENT Date Last Updated: 1/25/2021  Tandem  Pump Website Username: [email or web address]  Pump Website Password: [email or web address]       SYNTHROID 125 MCG tablet Synthroid 125 mcg,  1 pill for 6-day and half a pill 1 day a week 90 tablet 3     blood glucose monitoring (ONETOUCH VERIO IQ) test strip Use to test blood sugar 10 times daily or as directed. (Patient not taking: Reported on 9/28/2020) 900 strip 3     glucagon (GLUCAGON EMERGENCY) 1 MG kit Inject 1 mg into the muscle once for 1 dose 1 mg 0     insulin aspart (NOVOLOG FLEXPEN) 100 UNIT/ML pen USE 1 UNIT PER 10 GM CARB WITH BREAKFAST AND LUNCH AND 1 UNIT PER 8 GM CARB W/DINNER -=50UNITS/DAY (Patient not taking: Reported on 1/25/2021) 45 mL 3     insulin degludec (TRESIBA FLEXTOUCH) 200 UNIT/ML pen INJECT 30 UNITS SUBCUTANEOUS DAILY (Patient not taking: Reported on 1/25/2021) 15 mL 3     insulin pen needle (B-D U/F) 31G X 5 MM miscellaneous USE 4 DAILY OR AS DIRECTED. (Patient not taking: Reported on 1/25/2021) 400 each 3     ONETOUCH DELICA LANCETS 33G MISC 1 Box 6 times daily (Patient not taking: Reported on 1/25/2021) 100 each 12     Family History  Type 1 diabetes in her father, mother, paternal aunt, paternal uncle, brother and sister  Type 2 diabetes in her grandmother  Heart in her maternal grandfather and maternal grandmother  hypothyroid in her brother, father, maternal grandfather, maternal grandmother, mother, and sister.     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    Weight as of this encounter: 101.7 kg (224 lb 4.8 oz).  Constitutional: no distress, comfortable, pleasant   Eyes: anicteric  Musculoskeletal: no edema   Skin: no jaundice   Neurological: normal gait, intact sensation per monofilament test bilaterally (exam 2/4/19), no tremor  Psychological: appropriate mood     RESULTS    Lab Results   Component Value Date    A1C 7.2 04/26/2021    A1C 7.9 12/15/2020    A1C 7.7 06/23/2020    A1C 7.4 10/07/2019    A1C 7.2 06/10/2019       ENDO DIABETES Latest Ref Rng & Units 12/15/2020   CHOLESTEROL <200 mg/dL 175   LDL CHOLESTEROL, CALCULATED <100 mg/dL 85   LDL CHOLESTEROL DIRECT <100 mg/dL    HDL CHOLESTEROL >49 mg/dL 73   VLDL-CHOLESTEROL 0 - 30 mg/dL    NON HDL CHOLESTEROL <130 mg/dL 102   TRIGLYCERIDES <150 mg/dL 85   ALBUMIN URINE MG/L mg/L 8   ALBUMIN URINE MG/G CR 0 - 25 mg/g Cr 8.16   CREATININE 0.52 - 1.04 mg/dL 0.66      Ref. Range 2/5/2016 11:41   Glutamic Acid Decarboxylase Antibody Unknown 45.7 (H)   IA-2 Antibody Unknown 3.4 (H)   Islet Cell Antibody IgG Unknown <1:4...      Ref. Range 2/10/2016 08:35   C-Peptide Latest Ref Range: 0.9-6.9 ng/mL 0.7 (L)       ASSESSMENT:    Ms Brigitte Robles is a 45 years old female who has VDO visit for follow up type 1 diabetes and hypothyroidism    1) type 1 diabetes: diagnosed type 1 diabetes in Feb 2016. A1c is 7.2% today. She usually has postprandial hyperglycemia. No more hypoglycemia.     Plan:  - change sensitivity  - basal   MN 1.1 unit/hr  3 AM 1.3 unit/hr  9 PM 1.2 unit/hr    - ICR  MN: 1 unit per 8 gram  3 AM: 1 unit per 7 gram  9 PM: 1 unit per 8 gram    - correction factor of 1 unit per 45 above 110 mg/dl at 3 am and 10 pm  - continue using Dexcom    2) Hypothyroidism:clinically euthyroid. Lab returned normal " in Nov 2020. Continue Synthroid 125 mcg x6 days per week and 1/2 pill for 1 day per week.    3) Obesity: BMI 34. Encourage to continue on healthy diet and exercise. She is currently trying Weight Watcher    PLAN:   - change basal rate as above  - continue current dose of Synthroid  - RTC 6 months     External notes/medical records independently reviewed, labs and imaging independently reviewed, medical management and tests to be discussed/communicated to patient.    Time: I spent minutes on the date of the encounter preparing to see patient (including chart review and preparation), obtaining and or reviewing additional medical history, performing a physical exam and evaluation, documenting clinical information in the electronic health record, independently interpreting results, communicating results to the patient and coordinating care.    Lloyd Ledesma MD  Endocrinology  [phone]

## 2021-04-26 NOTE — NURSING NOTE
Brigitte Robles's goals for this visit include:   Chief Complaint   Patient presents with     RECHECK     Hypothyroidism due to Hashimoto's thyroiditis       She requests these members of her care team be copied on today's visit information:     PCP: Kehr, Kristen M    Referring Provider:  No referring provider defined for this encounter.    BP (!) 144/91 (BP Location: Left arm, Patient Position: Sitting, Cuff Size: Adult Large)   Pulse 79   Wt 101.7 kg (224 lb 4.8 oz)   SpO2 95%   BMI 37.33 kg/m      Do you need any medication refills at today's visit? No  Miranda Herbert CMA

## 2021-08-19 DIAGNOSIS — F41.1 GAD (GENERALIZED ANXIETY DISORDER): ICD-10-CM

## 2021-08-19 DIAGNOSIS — F32.0 MILD MAJOR DEPRESSION (H): ICD-10-CM

## 2021-08-19 RX ORDER — CITALOPRAM HYDROBROMIDE 20 MG/1
TABLET ORAL
Qty: 90 TABLET | Refills: 0 | Status: SHIPPED | OUTPATIENT
Start: 2021-08-19 | End: 2021-09-21

## 2021-08-19 NOTE — LETTER
August 19, 2021    Brigitte Robles  [address] [place] [phone]    Dear Brigitte,       We recently received a refill request for citalopram (CELEXA) 20 MG tablet.  We have refilled this for a one time 90 day supply only because you are due for a:    Physical/pap and medication check office visit      Please call at your earliest convenience so that there will not be a delay with your future refills.          Thank you,   Your [facility] Team/sp  [phone]

## 2021-08-19 NOTE — TELEPHONE ENCOUNTER
Prescription approved per [facility] Refill Protocol.        Peg Riojas RN  [facility]

## 2021-08-19 NOTE — TELEPHONE ENCOUNTER
Please contact patient. She is due to be seen for medication check for citalopram.   She is also due for a routine exam / Pap screening.     Please contact her to schedule an appointment within the next 90 days for a routine exam and medication check. This can be one appointment.       Kristen Kehr PA-C

## 2021-09-18 ENCOUNTER — HEALTH MAINTENANCE LETTER (OUTPATIENT)
Age: 46
End: 2021-09-18

## 2021-09-20 ASSESSMENT — ENCOUNTER SYMPTOMS
DYSURIA: 0
FEVER: 0
CHILLS: 0
CONSTIPATION: 0
EYE PAIN: 0
HEMATURIA: 0
HEARTBURN: 0
FREQUENCY: 0
JOINT SWELLING: 0
ABDOMINAL PAIN: 0
BREAST MASS: 0
DIZZINESS: 0
COUGH: 0
NAUSEA: 0
DIARRHEA: 0
ARTHRALGIAS: 0
WEAKNESS: 1
HEMATOCHEZIA: 0
SORE THROAT: 0
NERVOUS/ANXIOUS: 0
SHORTNESS OF BREATH: 1
PALPITATIONS: 0
PARESTHESIAS: 0
HEADACHES: 0
MYALGIAS: 0

## 2021-09-21 ENCOUNTER — OFFICE VISIT (OUTPATIENT)
Dept: FAMILY MEDICINE | Facility: CLINIC | Age: 46
End: 2021-09-21
Payer: COMMERCIAL

## 2021-09-21 VITALS
TEMPERATURE: 97.6 F | WEIGHT: 229 LBS | SYSTOLIC BLOOD PRESSURE: 136 MMHG | BODY MASS INDEX: 36.8 KG/M2 | OXYGEN SATURATION: 100 % | HEIGHT: 66 IN | DIASTOLIC BLOOD PRESSURE: 82 MMHG | HEART RATE: 77 BPM

## 2021-09-21 DIAGNOSIS — E66.01 MORBID OBESITY (H): ICD-10-CM

## 2021-09-21 DIAGNOSIS — Z12.31 VISIT FOR SCREENING MAMMOGRAM: ICD-10-CM

## 2021-09-21 DIAGNOSIS — Z23 NEED FOR PROPHYLACTIC VACCINATION AND INOCULATION AGAINST INFLUENZA: ICD-10-CM

## 2021-09-21 DIAGNOSIS — E10.9 TYPE 1 DIABETES MELLITUS WITHOUT COMPLICATION (H): ICD-10-CM

## 2021-09-21 DIAGNOSIS — F41.1 GAD (GENERALIZED ANXIETY DISORDER): ICD-10-CM

## 2021-09-21 DIAGNOSIS — Z00.00 ROUTINE GENERAL MEDICAL EXAMINATION AT A HEALTH CARE FACILITY: Primary | ICD-10-CM

## 2021-09-21 DIAGNOSIS — N92.1 METRORRHAGIA: ICD-10-CM

## 2021-09-21 DIAGNOSIS — F32.0 MILD MAJOR DEPRESSION (H): ICD-10-CM

## 2021-09-21 LAB
BASOPHILS # BLD AUTO: 0 10E3/UL (ref 0–0.2)
BASOPHILS NFR BLD AUTO: 0 %
EOSINOPHIL # BLD AUTO: 0.2 10E3/UL (ref 0–0.7)
EOSINOPHIL NFR BLD AUTO: 2 %
ERYTHROCYTE [DISTWIDTH] IN BLOOD BY AUTOMATED COUNT: 17.2 % (ref 10–15)
FERRITIN SERPL-MCNC: 6 NG/ML (ref 8–252)
HCT VFR BLD AUTO: 34.6 % (ref 35–47)
HGB BLD-MCNC: 11.1 G/DL (ref 11.7–15.7)
IRON SATN MFR SERPL: 7 % (ref 15–46)
IRON SERPL-MCNC: 27 UG/DL (ref 35–180)
LYMPHOCYTES # BLD AUTO: 2.9 10E3/UL (ref 0.8–5.3)
LYMPHOCYTES NFR BLD AUTO: 30 %
MCH RBC QN AUTO: 24.7 PG (ref 26.5–33)
MCHC RBC AUTO-ENTMCNC: 32.1 G/DL (ref 31.5–36.5)
MCV RBC AUTO: 77 FL (ref 78–100)
MONOCYTES # BLD AUTO: 0.7 10E3/UL (ref 0–1.3)
MONOCYTES NFR BLD AUTO: 7 %
NEUTROPHILS # BLD AUTO: 5.7 10E3/UL (ref 1.6–8.3)
NEUTROPHILS NFR BLD AUTO: 60 %
PLATELET # BLD AUTO: 493 10E3/UL (ref 150–450)
RBC # BLD AUTO: 4.49 10E6/UL (ref 3.8–5.2)
TIBC SERPL-MCNC: 392 UG/DL (ref 240–430)
WBC # BLD AUTO: 9.5 10E3/UL (ref 4–11)

## 2021-09-21 PROCEDURE — 83550 IRON BINDING TEST: CPT | Performed by: PHYSICIAN ASSISTANT

## 2021-09-21 PROCEDURE — 90686 IIV4 VACC NO PRSV 0.5 ML IM: CPT | Performed by: PHYSICIAN ASSISTANT

## 2021-09-21 PROCEDURE — 99213 OFFICE O/P EST LOW 20 MIN: CPT | Mod: 25 | Performed by: PHYSICIAN ASSISTANT

## 2021-09-21 PROCEDURE — 87624 HPV HI-RISK TYP POOLED RSLT: CPT | Performed by: PHYSICIAN ASSISTANT

## 2021-09-21 PROCEDURE — G0145 SCR C/V CYTO,THINLAYER,RESCR: HCPCS | Performed by: PHYSICIAN ASSISTANT

## 2021-09-21 PROCEDURE — 85025 COMPLETE CBC W/AUTO DIFF WBC: CPT | Performed by: PHYSICIAN ASSISTANT

## 2021-09-21 PROCEDURE — 99396 PREV VISIT EST AGE 40-64: CPT | Mod: 25 | Performed by: PHYSICIAN ASSISTANT

## 2021-09-21 PROCEDURE — 90471 IMMUNIZATION ADMIN: CPT | Performed by: PHYSICIAN ASSISTANT

## 2021-09-21 PROCEDURE — 36415 COLL VENOUS BLD VENIPUNCTURE: CPT | Performed by: PHYSICIAN ASSISTANT

## 2021-09-21 PROCEDURE — 82728 ASSAY OF FERRITIN: CPT | Performed by: PHYSICIAN ASSISTANT

## 2021-09-21 RX ORDER — LISINOPRIL 5 MG/1
5 TABLET ORAL DAILY
Qty: 90 TABLET | Refills: 3 | Status: SHIPPED | OUTPATIENT
Start: 2021-09-21 | End: 2022-09-26

## 2021-09-21 RX ORDER — CITALOPRAM HYDROBROMIDE 20 MG/1
20 TABLET ORAL DAILY
Qty: 90 TABLET | Refills: 3 | Status: SHIPPED | OUTPATIENT
Start: 2021-09-21 | End: 2022-10-17

## 2021-09-21 RX ORDER — ATORVASTATIN CALCIUM 10 MG/1
10 TABLET, FILM COATED ORAL DAILY
Qty: 90 TABLET | Refills: 3 | Status: SHIPPED | OUTPATIENT
Start: 2021-09-21 | End: 2022-09-26

## 2021-09-21 ASSESSMENT — ENCOUNTER SYMPTOMS
HEARTBURN: 0
HEMATOCHEZIA: 0
PARESTHESIAS: 0
JOINT SWELLING: 0
EYE PAIN: 0
MYALGIAS: 0
DIARRHEA: 0
CHILLS: 0
FEVER: 0
BREAST MASS: 0
NERVOUS/ANXIOUS: 0
ABDOMINAL PAIN: 0
CONSTIPATION: 0
ARTHRALGIAS: 0
FREQUENCY: 0
WEAKNESS: 1
SORE THROAT: 0
HEMATURIA: 0
NAUSEA: 0
HEADACHES: 0
SHORTNESS OF BREATH: 1
DIZZINESS: 0
DYSURIA: 0
COUGH: 0
PALPITATIONS: 0

## 2021-09-21 ASSESSMENT — ANXIETY QUESTIONNAIRES
GAD7 TOTAL SCORE: 0
6. BECOMING EASILY ANNOYED OR IRRITABLE: NOT AT ALL
1. FEELING NERVOUS, ANXIOUS, OR ON EDGE: NOT AT ALL
7. FEELING AFRAID AS IF SOMETHING AWFUL MIGHT HAPPEN: NOT AT ALL
3. WORRYING TOO MUCH ABOUT DIFFERENT THINGS: NOT AT ALL
2. NOT BEING ABLE TO STOP OR CONTROL WORRYING: NOT AT ALL
5. BEING SO RESTLESS THAT IT IS HARD TO SIT STILL: NOT AT ALL

## 2021-09-21 ASSESSMENT — MIFFLIN-ST. JEOR: SCORE: 1700.49

## 2021-09-21 ASSESSMENT — PATIENT HEALTH QUESTIONNAIRE - PHQ9
5. POOR APPETITE OR OVEREATING: NOT AT ALL
SUM OF ALL RESPONSES TO PHQ QUESTIONS 1-9: 0

## 2021-09-21 ASSESSMENT — PAIN SCALES - GENERAL: PAINLEVEL: NO PAIN (0)

## 2021-09-21 NOTE — NURSING NOTE
"Chief Complaint   Patient presents with     Physical     Imm/Inj     Flu Shot       Initial /82   Pulse 77   Temp 97.6  F (36.4  C) (Tympanic)   Ht 1.676 m (5' 6\")   Wt 103.9 kg (229 lb)   LMP 09/05/2021   SpO2 100%   BMI 36.96 kg/m   Estimated body mass index is 36.96 kg/m  as calculated from the following:    Height as of this encounter: 1.676 m (5' 6\").    Weight as of this encounter: 103.9 kg (229 lb).  Medication Reconciliation: complete    AUDRA Vera MA    "

## 2021-09-21 NOTE — PROGRESS NOTES
SUBJECTIVE:   CC: Brigitte Robles is an 46 year old woman who presents for preventive health visit.       Patient has been advised of split billing requirements and indicates understanding: Yes  Healthy Habits:     Getting at least 3 servings of Calcium per day:  NO    Bi-annual eye exam:  Yes    Dental care twice a year:  Yes    Sleep apnea or symptoms of sleep apnea:  None    Diet:  Diabetic    Frequency of exercise:  4-5 days/week    Duration of exercise:  30-45 minutes    Taking medications regularly:  Yes    Barriers to taking medications:  None    PHQ-2 Total Score: 0    Additional concerns today:  Yes        PROBLEMS TO ADD....  1. Depression / anxiety: she has been stable with the use of citalopram and will need refills of her medication. She is taking 20 mg dose.   2 Heavy menses: this has become heavier within the past year. She is not on contraception.   3. Diabetes insulin dependent: managed by Endocrine.       Today's PHQ-2 Score:   PHQ-2 ( 1999 Pfizer) 9/20/2021   Q1: Little interest or pleasure in doing things 0   Q2: Feeling down, depressed or hopeless 0   PHQ-2 Score 0   Q1: Little interest or pleasure in doing things Not at all   Q2: Feeling down, depressed or hopeless Not at all   PHQ-2 Score 0       Abuse: Current or Past (Physical, Sexual or Emotional) - No  Do you feel safe in your environment? Yes    Have you ever done Advance Care Planning? (For example, a Health Directive, POLST, or a discussion with a medical provider or your loved ones about your wishes): No, advance care planning information given to patient to review.  Patient declined advance care planning discussion at this time.    Social History     Tobacco Use     Smoking status: Never Smoker     Smokeless tobacco: Never Used   Substance Use Topics     Alcohol use: Yes     Comment: rare     If you drink alcohol do you typically have >3 drinks per day or >7 drinks per week? No    Alcohol Use 9/20/2021   Prescreen: >3 drinks/day  or >7 drinks/week? No   Prescreen: >3 drinks/day or >7 drinks/week? -       Reviewed orders with patient.  Reviewed health maintenance and updated orders accordingly - Yes  BP Readings from Last 3 Encounters:   09/21/21 136/82   04/26/21 (!) 144/91   10/08/19 128/80    Wt Readings from Last 3 Encounters:   09/21/21 103.9 kg (229 lb)   04/26/21 101.7 kg (224 lb 4.8 oz)   10/07/19 89 kg (196 lb 4.8 oz)                  Patient Active Problem List   Diagnosis     Depressive disorder, not elsewhere classified     Hypothyroidism     GLUCOCORTICOID DEFICIENT     Anxiety     CARDIOVASCULAR SCREENING; LDL GOAL LESS THAN 160     Seasonal allergic rhinitis     Type 1 diabetes mellitus without complication (H)     Morbid obesity (H)     Past Surgical History:   Procedure Laterality Date     HC REMOVAL OF TONSILS,12+ Y/O         Social History     Tobacco Use     Smoking status: Never Smoker     Smokeless tobacco: Never Used   Substance Use Topics     Alcohol use: Yes     Comment: rare     Family History   Problem Relation Age of Onset     Diabetes Mother      Hypertension Mother      Thyroid Disease Mother      Diabetes Father         1     Thyroid Disease Father         BORIS'S     Diabetes Sister      Thyroid Disease Sister      Heart Disease Maternal Grandmother         TRIPLE BYPASS     Thyroid Disease Maternal Grandmother      Diabetes Maternal Grandmother         2     Heart Disease Maternal Grandfather         TRIPLE BYPASS     Coronary Artery Disease Maternal Grandfather      Thyroid Disease Maternal Grandfather      Thyroid Disease Brother      Diabetes Paternal Aunt      Diabetes Paternal Uncle      Diabetes Brother         1     Thyroid Disease Brother      Diabetes Nephew         1     Depression Sister      Thyroid Disease Sister      Diabetes Sister         1     Breast Cancer No family hx of      Cancer - colorectal No family hx of          Current Outpatient Medications   Medication Sig Dispense Refill      atorvastatin (LIPITOR) 10 MG tablet Take 1 tablet (10 mg) by mouth daily 90 tablet 3     blood glucose monitoring (ONETOUCH VERIO IQ) test strip Use to test blood sugar 10 times daily or as directed. 900 strip 3     citalopram (CELEXA) 20 MG tablet Take 1 tablet (20 mg) by mouth daily 90 tablet 3     Continuous Blood Gluc  (DEXCOM G6 ) MAGGY 1 Device continuous 1 Device 0     Continuous Blood Gluc Sensor (DEXCOM G6 SENSOR) MISC 1 each See Admin Instructions Change sensor every 10 days. 9 each 3     Continuous Blood Gluc Transmit (DEXCOM G6 TRANSMITTER) MISC 1 each every 3 months 1 each 3     insulin aspart (NOVOLOG FLEXPEN) 100 UNIT/ML pen USE 1 UNIT PER 10 GM CARB WITH BREAKFAST AND LUNCH AND 1 UNIT PER 8 GM CARB W/DINNER -=50UNITS/DAY 45 mL 3     insulin aspart (NOVOLOG VIAL) 100 UNITS/ML vial Use as directed via insulin pump. Total daily dose approx 80 units. 80 mL 3     insulin degludec (TRESIBA FLEXTOUCH) 200 UNIT/ML pen INJECT 30 UNITS SUBCUTANEOUS DAILY 15 mL 3     insulin pen needle (B-D U/F) 31G X 5 MM miscellaneous USE 4 DAILY OR AS DIRECTED. 400 each 3     INSULIN PUMP - OUTPATIENT Date Last Updated: 1/25/2021  [employer]  Pump Website Username: [email or web address]  Pump Website Password: [email or web address]       lisinopril (ZESTRIL) 5 MG tablet Take 1 tablet (5 mg) by mouth daily 90 tablet 3     ONETOUCH DELICA LANCETS 33G MISC 1 Box 6 times daily 100 each 12     SYNTHROID 125 MCG tablet Synthroid 125 mcg,  1 pill for 6-day and half a pill 1 day a week 90 tablet 3     glucagon (GLUCAGON EMERGENCY) 1 MG kit Inject 1 mg into the muscle once for 1 dose 1 mg 0     Allergies   Allergen Reactions     Levothyroxine      Dizzy to generic --- not to synthroid     Recent Labs   Lab Test 04/26/21  0000 12/15/20  1624 06/23/20  1448 10/08/19  0833 10/07/19  0000 07/11/19  1743 06/10/19  0000 05/08/19  1029 02/04/19  0000 10/15/18  1628 11/10/17  1305 09/18/17  1628 02/10/16  0835 02/05/16  1141    A1C 7.2* 7.9* 7.7*  --    < >  --    < >  --    < >  --    < > 7.4*   < > 7.0*   LDL  --  85  --  94  --   --   --   --   --  86  --  63  --   --    HDL  --  73  --  71  --   --   --   --   --   --   --  77  --   --    TRIG  --  85  --  61  --   --   --   --   --   --   --  93  --   --    ALT  --   --   --   --   --   --   --  23  --   --   --   --   --  20   CR  --  0.66  --   --   --  0.69  --  0.72  --  0.73  --  0.74   < > 0.57   GFRESTIMATED  --  >90  --   --   --  >90  --  >90  --  87  --  86   < > >90  Non  GFR Calc     GFRESTBLACK  --  >90  --   --   --  >90  --  >90  --  >90  --  >90   < > >90  African American GFR Calc     POTASSIUM  --   --   --   --   --  3.5  --  4.2  --   --   --   --    < > 3.8   TSH  --  2.07 1.14 0.29*   < > 1.78   < > 0.07*  --  1.78   < > 8.07*   < >  --     < > = values in this interval not displayed.        Breast Cancer Screening:    Breast CA Risk Assessment (FHS-7) 9/20/2021   Do you have a family history of breast, colon, or ovarian cancer? No / Unknown         Mammogram Screening: Recommended annual mammography  Pertinent mammograms are reviewed under the imaging tab.    History of abnormal Pap smear:   NO - age 30-65 PAP every 5 years with negative HPV co-testing recommended  Last 3 Pap and HPV Results:   PAP / HPV Latest Ref Rng & Units 9/25/2017 3/6/2013 12/6/2010   PAP (Historical) - NIL NIL NIL   HPV16 NEG:Negative Negative - -   HPV18 NEG:Negative Negative - -   HRHPV NEG:Negative Negative - -     PAP / HPV Latest Ref Rng & Units 9/25/2017 3/6/2013 12/6/2010   PAP (Historical) - NIL NIL NIL   HPV16 NEG:Negative Negative - -   HPV18 NEG:Negative Negative - -   HRHPV NEG:Negative Negative - -     Reviewed and updated as needed this visit by clinical staff  Tobacco  Allergies  Meds   Med Hx  Surg Hx  Fam Hx  Soc Hx        Reviewed and updated as needed this visit by Provider                Past Medical History:   Diagnosis Date     Anxiety       "DEPRESSIVE DISORDER NEC 2003     Type 1 diabetes mellitus without complication (H) 2016     Unspecified hypothyroidism 2003      Past Surgical History:   Procedure Laterality Date     HC REMOVAL OF TONSILS,12+ Y/O       OB History    Para Term  AB Living   2 2 2 0 0 2   SAB TAB Ectopic Multiple Live Births   0 0 0 0 0      # Outcome Date GA Lbr Nabeel/2nd Weight Sex Delivery Anes PTL Lv   2 Term            1 Term                Review of Systems   Constitutional: Negative for chills and fever.   HENT: Negative for congestion, ear pain, hearing loss and sore throat.    Eyes: Negative for pain and visual disturbance.   Respiratory: Positive for shortness of breath. Negative for cough.    Cardiovascular: Negative for chest pain, palpitations and peripheral edema.   Gastrointestinal: Negative for abdominal pain, constipation, diarrhea, heartburn, hematochezia and nausea.   Breasts:  Negative for tenderness, breast mass and discharge.   Genitourinary: Positive for pelvic pain and vaginal bleeding. Negative for dysuria, frequency, genital sores, hematuria, urgency and vaginal discharge.   Musculoskeletal: Negative for arthralgias, joint swelling and myalgias.   Skin: Negative for rash.   Neurological: Positive for weakness. Negative for dizziness, headaches and paresthesias.   Psychiatric/Behavioral: Negative for mood changes. The patient is not nervous/anxious.           OBJECTIVE:   /82   Pulse 77   Temp 97.6  F (36.4  C) (Tympanic)   Ht 1.676 m (5' 6\")   Wt 103.9 kg (229 lb)   LMP 2021   SpO2 100%   BMI 36.96 kg/m    Physical Exam  GENERAL: healthy, alert and no distress  EYES: Eyes grossly normal to inspection, PERRL and conjunctivae and sclerae normal  HENT: ear canals and TM's normal, nose and mouth without ulcers or lesions  NECK: no adenopathy, no asymmetry, masses, or scars and thyroid normal to palpation  RESP: lungs clear to auscultation - no rales, rhonchi or " wheezes  BREAST: normal without masses, tenderness or nipple discharge and no palpable axillary masses or adenopathy  CV: regular rate and rhythm, normal S1 S2, no S3 or S4, no murmur, click or rub, no peripheral edema and peripheral pulses strong  ABDOMEN: soft, nontender, no hepatosplenomegaly, no masses and bowel sounds normal   (female): normal female external genitalia, normal urethral meatus, vaginal mucosa pink, moist, well rugated, and normal cervix has a large polyp present / adnexa/uterus without masses or discharge  MS: no gross musculoskeletal defects noted, no edema  SKIN: no suspicious lesions or rashes  NEURO: Normal strength and tone, mentation intact and speech normal  PSYCH: mentation appears normal, affect normal/bright    Diagnostic Test Results:  Labs reviewed in Epic    ASSESSMENT/PLAN:   (Z00.00) Routine general medical examination at a health care facility  (primary encounter diagnosis)  Comment: Health maintenance reviewed and updated.  Plan: PAP screen with HPV - recommended age 30 - 65         years            (N92.1) Metrorrhagia  Comment: CBC and iron studies completed. She is having symptoms of anemia. She will also get a pelvic ultrasound. ? Fibroids. Consider management with IUD vs contraception. She may also pursue a consultation for ablation. Discuss after results are back.   Plan: CBC with platelets and differential, Ferritin,         Iron and iron binding capacity, US Pelvic         Complete with Transvaginal            (F32.0) Mild major depression (H)  (F41.1) MONTSERRAT (generalized anxiety disorder)  Comment: stable, refills given   Plan: citalopram (CELEXA) 20 MG tablet            (E66.01) Morbid obesity (H)  Recommend lifestyle changes with diet and exercise. She tried WW and did not have success. She also has hypothyroid and diabetes and finds they are barriers to her weight loss. She will continue to work with Endocrine.     (E10.9) Type 1 diabetes mellitus without complication  "(H)  Comment:insulin managed by Endocrine.   Add small dose of lisinopril and lipitor.   Side effects and how to take the medication discussed.  She will be having routine fasting tests with her Endocrine appointment in 1-2 months.   Plan: lisinopril (ZESTRIL) 5 MG tablet, atorvastatin         (LIPITOR) 10 MG tablet            (Z12.31) Visit for screening mammogram  Comment: scheduled.   Plan: MA SCREENING DIGITAL BILAT - Future  (s+30)            (Z23) Need for prophylactic vaccination and inoculation against influenza      Patient has been advised of split billing requirements and indicates understanding: Yes  COUNSELING:  Reviewed preventive health counseling, as reflected in patient instructions       Regular exercise       Healthy diet/nutrition       Contraception       Colon cancer screening       Consider Hep C screening for all patients one time for ages 18-79 years       HIV screeninx in teen years, 1x in adult years, and at intervals if high risk       (Viky)menopause management       Advance Care Planning    Estimated body mass index is 36.96 kg/m  as calculated from the following:    Height as of this encounter: 1.676 m (5' 6\").    Weight as of this encounter: 103.9 kg (229 lb).    Weight management plan: Discussed healthy diet and exercise guidelines    She reports that she has never smoked. She has never used smokeless tobacco.      Counseling Resources:  ATP IV Guidelines  Pooled Cohorts Equation Calculator  Breast Cancer Risk Calculator  BRCA-Related Cancer Risk Assessment: FHS-7 Tool  FRAX Risk Assessment  ICSI Preventive Guidelines  Dietary Guidelines for Americans,   USDA's MyPlate  ASA Prophylaxis  Lung CA Screening    Kristen M. Kehr, PA-C [facility]  "

## 2021-09-21 NOTE — PATIENT INSTRUCTIONS
Contact Nabil Sheppard [phone] to schedule appointment for pelvic ultrasound                Preventive Health Recommendations  Female Ages 40 to 49    Yearly exam:     See your health care provider every year in order to  1. Review health changes.   2. Discuss preventive care.    3. Review your medicines if your doctor prescribed any.      Get a Pap test every three years (unless you have an abnormal result and your provider advises testing more often).      If you get Pap tests with HPV test, you only need to test every 5 years, unless you have an abnormal result. You do not need a Pap test if your uterus was removed (hysterectomy) and you have not had cancer.      You should be tested each year for STDs (sexually transmitted diseases), if you're at risk.     Ask your doctor if you should have a mammogram.      Have a colonoscopy (test for colon cancer) if someone in your family has had colon cancer or polyps before age 50.       Have a cholesterol test every 5 years.       Have a diabetes test (fasting glucose) after age 45. If you are at risk for diabetes, you should have this test every 3 years.    Shots: Get a flu shot each year. Get a tetanus shot every 10 years.     Nutrition:     Eat at least 5 servings of fruits and vegetables each day.    Eat whole-grain bread, whole-wheat pasta and brown rice instead of white grains and rice.    Get adequate Calcium and Vitamin D.      Lifestyle    Exercise at least 150 minutes a week (an average of 30 minutes a day, 5 days a week). This will help you control your weight and prevent disease.    Limit alcohol to one drink per day.    No smoking.     Wear sunscreen to prevent skin cancer.    See your dentist every six months for an exam and cleaning.

## 2021-09-22 PROBLEM — N92.1 METRORRHAGIA: Status: ACTIVE | Noted: 2021-09-22

## 2021-09-22 PROBLEM — F41.1 GAD (GENERALIZED ANXIETY DISORDER): Status: ACTIVE | Noted: 2021-09-22

## 2021-09-22 ASSESSMENT — ANXIETY QUESTIONNAIRES: GAD7 TOTAL SCORE: 0

## 2021-09-23 ENCOUNTER — ANCILLARY PROCEDURE (OUTPATIENT)
Dept: MAMMOGRAPHY | Facility: CLINIC | Age: 46
End: 2021-09-23
Attending: PHYSICIAN ASSISTANT
Payer: COMMERCIAL

## 2021-09-23 ENCOUNTER — ANCILLARY PROCEDURE (OUTPATIENT)
Dept: ULTRASOUND IMAGING | Facility: CLINIC | Age: 46
End: 2021-09-23
Attending: PHYSICIAN ASSISTANT
Payer: COMMERCIAL

## 2021-09-23 DIAGNOSIS — N92.1 METRORRHAGIA: ICD-10-CM

## 2021-09-23 DIAGNOSIS — Z12.31 VISIT FOR SCREENING MAMMOGRAM: ICD-10-CM

## 2021-09-23 LAB
BKR LAB AP GYN ADEQUACY: NORMAL
BKR LAB AP GYN INTERPRETATION: NORMAL
BKR LAB AP HPV REFLEX: NORMAL
BKR LAB AP PREVIOUS ABNORMAL: NORMAL
PATH REPORT.COMMENTS IMP SPEC: NORMAL
PATH REPORT.RELEVANT HX SPEC: NORMAL

## 2021-09-23 PROCEDURE — 76856 US EXAM PELVIC COMPLETE: CPT | Mod: 59 | Performed by: STUDENT IN AN ORGANIZED HEALTH CARE EDUCATION/TRAINING PROGRAM

## 2021-09-23 PROCEDURE — 77063 BREAST TOMOSYNTHESIS BI: CPT | Mod: GC | Performed by: RADIOLOGY

## 2021-09-23 PROCEDURE — 76830 TRANSVAGINAL US NON-OB: CPT | Performed by: STUDENT IN AN ORGANIZED HEALTH CARE EDUCATION/TRAINING PROGRAM

## 2021-09-23 PROCEDURE — 77067 SCR MAMMO BI INCL CAD: CPT | Mod: GC | Performed by: RADIOLOGY

## 2021-09-24 DIAGNOSIS — R93.89 ABNORMAL ULTRASOUND: ICD-10-CM

## 2021-09-24 DIAGNOSIS — N92.1 METRORRHAGIA: Primary | ICD-10-CM

## 2021-09-25 LAB
HUMAN PAPILLOMA VIRUS 16 DNA: NEGATIVE
HUMAN PAPILLOMA VIRUS 18 DNA: NEGATIVE
HUMAN PAPILLOMA VIRUS FINAL DIAGNOSIS: NORMAL
HUMAN PAPILLOMA VIRUS OTHER HR: NEGATIVE

## 2021-09-28 ENCOUNTER — OFFICE VISIT (OUTPATIENT)
Dept: OBGYN | Facility: CLINIC | Age: 46
End: 2021-09-28
Attending: PHYSICIAN ASSISTANT
Payer: COMMERCIAL

## 2021-09-28 VITALS
HEART RATE: 75 BPM | OXYGEN SATURATION: 97 % | BODY MASS INDEX: 37.28 KG/M2 | SYSTOLIC BLOOD PRESSURE: 162 MMHG | DIASTOLIC BLOOD PRESSURE: 82 MMHG | WEIGHT: 231 LBS

## 2021-09-28 DIAGNOSIS — N84.1 CERVICAL POLYP: ICD-10-CM

## 2021-09-28 DIAGNOSIS — N93.9 ABNORMAL UTERINE BLEEDING (AUB): Primary | ICD-10-CM

## 2021-09-28 DIAGNOSIS — R93.89 ABNORMAL ULTRASOUND: ICD-10-CM

## 2021-09-28 DIAGNOSIS — N92.1 METRORRHAGIA: ICD-10-CM

## 2021-09-28 PROCEDURE — 88305 TISSUE EXAM BY PATHOLOGIST: CPT | Performed by: PATHOLOGY

## 2021-09-28 PROCEDURE — 58100 BIOPSY OF UTERUS LINING: CPT | Performed by: OBSTETRICS & GYNECOLOGY

## 2021-09-28 PROCEDURE — 99204 OFFICE O/P NEW MOD 45 MIN: CPT | Mod: 25 | Performed by: OBSTETRICS & GYNECOLOGY

## 2021-09-29 ENCOUNTER — TELEPHONE (OUTPATIENT)
Dept: OBGYN | Facility: CLINIC | Age: 46
End: 2021-09-29

## 2021-09-29 NOTE — TELEPHONE ENCOUNTER
Associated Diagnoses    Abnormal uterine bleeding (AUB) [N93.9]  - Primary           Source Order Set    Order Set Name Order ID    [id number]     Detailed Information    Priority and Order Details             Order Questions    Question Answer Comment   Procedure name(s) - multi select total laparoscopic hysterectomy, bilateral salpingectomy, possible cystoscopy    Reason for procedure abnormal uterine bleeding    Surgeon: Mireya Harman, DO    Is this a multi surgeon case? Yes    Comments (who/departments/details) Dr. Cerna, German or Carina to assist    Laterality N/A    Request for additional equipment Other (see comments) None   Anesthesia General    Initiate Pre-op orders for above procedure: Yes, as ordered in Epic Additional orders noted there also   Location of Case: [facility]    Sterilization or Hysterectomy consent signed in advance: Yes (note date signed in comments) 9/28/21   Urgency of Surgery: Routine    Surgeon Procedure Time (incision to closure) in minutes (per procedure as applicable) 90    Note:  Surgical Case Time Needed (in minutes)   Patient Class (for admit prior to surgery, specify number of days in comments): Same day (hospital outpatient)    Why can t this outpatient surgery be done at the [facility] or  ASC? surgeon preference    H&P To Be Completed By: PCP    Post-Op Appointment 4 weeks    Vendor Needed? No      SURGERY SCHEDULING AND PRECERTIFICATION    Medical Record Number: [id number]  Brigitte Robles  YOB: 1975   Phone: [phone] (home) [phone] (work)  Primary Provider: Kehr, Kristen M    Reason for Admit:  ICD-10 CODE:    Abnormal uterine bleeding (AUB) [N93.9]  - Primary           Surgeon: Jacki Thomas  Surgical Procedure:   Procedure name(s) - multi select total laparoscopic hysterectomy, bilateral salpingectomy, possible cystoscopy       Date of Surgery 11/11 Time of Surgery 3:00 p.m.  Surgery to be performed at:  [facility]  Status: Outpatient  Type of Anesthesia Anticipated: General    Sterilization consent:  Yes and was signed on 9/28.    Pre-Op: On 11/4 with Kristen Kehr  COVID testing:  Covid testing is not required because the patient has been vaccinated, is not immunocompromised and does not have Covid symptoms per [facility] guidelines.   Post-Op:  4 weeks on 12/14 with Dr. Harman at [place] 8:40 a.m.    Pre-certification routed to Financial Counselors:  Yes    Surgery packet mailed to patient's home address: Yes  Patient instructed NPO 12 hours prior to surgery, arrive 1.5 hour(s) prior to surgery, must have a .  Patient understood and agrees to the plan.      Requestor:  Jill Herbert     Location:  [place] [phone]

## 2021-09-30 ENCOUNTER — TELEPHONE (OUTPATIENT)
Dept: OBGYN | Facility: CLINIC | Age: 46
End: 2021-09-30

## 2021-09-30 ENCOUNTER — ANCILLARY PROCEDURE (OUTPATIENT)
Dept: ULTRASOUND IMAGING | Facility: CLINIC | Age: 46
End: 2021-09-30
Attending: PHYSICIAN ASSISTANT
Payer: COMMERCIAL

## 2021-09-30 ENCOUNTER — ANCILLARY PROCEDURE (OUTPATIENT)
Dept: MAMMOGRAPHY | Facility: CLINIC | Age: 46
End: 2021-09-30
Attending: PHYSICIAN ASSISTANT
Payer: COMMERCIAL

## 2021-09-30 DIAGNOSIS — R92.8 ABNORMAL MAMMOGRAM: ICD-10-CM

## 2021-09-30 LAB
PATH REPORT.COMMENTS IMP SPEC: NORMAL
PATH REPORT.COMMENTS IMP SPEC: NORMAL
PATH REPORT.FINAL DX SPEC: NORMAL
PATH REPORT.GROSS SPEC: NORMAL
PATH REPORT.MICROSCOPIC SPEC OTHER STN: NORMAL
PATH REPORT.MICROSCOPIC SPEC OTHER STN: NORMAL
PATH REPORT.RELEVANT HX SPEC: NORMAL
PHOTO IMAGE: NORMAL

## 2021-09-30 PROCEDURE — 77065 DX MAMMO INCL CAD UNI: CPT | Mod: LT | Performed by: STUDENT IN AN ORGANIZED HEALTH CARE EDUCATION/TRAINING PROGRAM

## 2021-09-30 PROCEDURE — 77061 BREAST TOMOSYNTHESIS UNI: CPT | Mod: LT | Performed by: STUDENT IN AN ORGANIZED HEALTH CARE EDUCATION/TRAINING PROGRAM

## 2021-09-30 PROCEDURE — 76642 ULTRASOUND BREAST LIMITED: CPT | Mod: LT | Performed by: STUDENT IN AN ORGANIZED HEALTH CARE EDUCATION/TRAINING PROGRAM

## 2021-09-30 NOTE — TELEPHONE ENCOUNTER
Received a form from [employer]  From was filled out and put in Dr. Harman's basket for signature.  Anastasiya Caal M.A.

## 2021-09-30 NOTE — TELEPHONE ENCOUNTER
Forms completed and faxed to [employer] at [phone].    [facility] and sent to abstracting for scanning.    .    Shelly Rawls CMA

## 2021-10-11 NOTE — TELEPHONE ENCOUNTER
PB DOS: 11/11/21 [facility] outpatient  Type of Procedure: lap hysterectomy salpingo ooph  CPT Codes: 16731  ICD10 Codes: n93.9  Surgeon/Ordering provider: colin  Pre-cert/Authorization completed:  No pa required per [facility] sites  Payer: [facility]  Date Checked:   Spoke to   Ref. # [email or web address]/sites/default/files/document/attachment/[email or web address]/[email or web address]-Service-Code-List.pdf  [email or web address]/providers/medical-policies-and-coverage/prior-plan-review/ Auth #   Valid Dates:

## 2021-10-19 ENCOUNTER — MYC MEDICAL ADVICE (OUTPATIENT)
Dept: ENDOCRINOLOGY | Facility: CLINIC | Age: 46
End: 2021-10-19

## 2021-10-19 PROBLEM — F32.9 MAJOR DEPRESSION: Status: ACTIVE | Noted: 2021-09-22

## 2021-10-20 NOTE — TELEPHONE ENCOUNTER
Patient's appt has been changed to virtual and informed patient that clinic will be calling 15-20 minutes prior to appt time to get visit ready for provider.    Nisreen SAMPSON MA   [facility]   [facility]

## 2021-10-22 NOTE — PROGRESS NOTES
Outcome for 10/25/21 9:11 AM :BG report sent to provider email.    Lucero Lopez CMA  Adult Endocrinology  [facility]    Outcome for 10/22/21 11:06 AM : Patient is sharing data via clinic device website and available for Dexcom    Nisreen SAMPSON MA   Adult Endocrine   [facility]

## 2021-10-25 ENCOUNTER — VIRTUAL VISIT (OUTPATIENT)
Dept: ENDOCRINOLOGY | Facility: CLINIC | Age: 46
End: 2021-10-25
Payer: COMMERCIAL

## 2021-10-25 DIAGNOSIS — E10.65 TYPE 1 DIABETES MELLITUS WITH HYPERGLYCEMIA (H): Primary | ICD-10-CM

## 2021-10-25 DIAGNOSIS — E10.9 TYPE 1 DIABETES MELLITUS WITHOUT COMPLICATION (H): ICD-10-CM

## 2021-10-25 DIAGNOSIS — E03.9 HYPOTHYROIDISM, UNSPECIFIED TYPE: ICD-10-CM

## 2021-10-25 PROCEDURE — 99214 OFFICE O/P EST MOD 30 MIN: CPT | Mod: 95 | Performed by: INTERNAL MEDICINE

## 2021-10-25 RX ORDER — PROCHLORPERAZINE 25 MG/1
1 SUPPOSITORY RECTAL SEE ADMIN INSTRUCTIONS
Qty: 9 EACH | Refills: 3 | Status: SHIPPED | OUTPATIENT
Start: 2021-10-25 | End: 2022-09-26

## 2021-10-25 RX ORDER — PROCHLORPERAZINE 25 MG/1
1 SUPPOSITORY RECTAL
Qty: 1 EACH | Refills: 3 | Status: SHIPPED | OUTPATIENT
Start: 2021-10-25 | End: 2022-09-26

## 2021-10-25 ASSESSMENT — ENCOUNTER SYMPTOMS
DECREASED LIBIDO: 1
HOT FLASHES: 1

## 2021-10-25 NOTE — PATIENT INSTRUCTIONS
- lab at your convenience - please fast    If you have any questions, please do not hesitate to call [facility] at [phone] and ask for Endocrinology clinic.    Sincerely,    Lloyd Ledesma MD  Endocrinology

## 2021-10-25 NOTE — PROGRESS NOTES
Endocrinology Note         Brigitte is a 46 year old female who has VDO visit for type 1 diabetes.    HPI  Ms Brigitte Robles is a 46 years old female who has VDO visit for type 1 diabetes. Last seen 4/2021    She was noted to have increased blurred vision and fatigue in February 2016 and noted to have blood glucose of 300s and A1C of 7.0%. She was initially diagnosed with type 2 diabetes and was started on metformin 500 mg bid which did not control her blood glucose. She was then seen by her PCP who ran more blood test and noted for low c-peptide and positive MONTSERRAT ab and IA2 antibody. She was then started on insulin since.    Interval history:  She will have hysterectomy this November due to heavy menses and anemia.    She is currently using Tandem and Dexcom and is very pleased with it.    1) type 1 diabetes:  A1c is 7.2% (4/2021). Her pattern is postprandial hyperglycemia after lunch and dinner. She has not had much of hypoglycemia anymore after switching to the pump.    Reviewed Dexcom and Tandem. She is using Control IQ.         Pump setting      2) hypothyroidism: She has history of hypothyroidism for the past 11 years. It was diagnosed about 6 months postpartum. She is currently on Synthroid 125 mcg daily for 6 days and 1/2 pill for 1 day per week.   Lab on 12/15/2020 showed TSH 2.07, FT4 1.22.    Past Medical History  Type 1 diabetes  Hypothyroidism    Allergies  Allergies   Allergen Reactions     Levothyroxine      Dizzy to generic --- not to synthroid     Medications  Current Outpatient Medications   Medication Sig Dispense Refill     atorvastatin (LIPITOR) 10 MG tablet Take 1 tablet (10 mg) by mouth daily 90 tablet 3     blood glucose monitoring (ONETOUCH VERIO IQ) test strip Use to test blood sugar 10 times daily or as directed. 900 strip 3     citalopram (CELEXA) 20 MG tablet Take 1 tablet (20 mg) by mouth daily 90 tablet 3     Continuous Blood Gluc  (DEXCOM G6 ) MAGGY 1 Device continuous  1 Device 0     Continuous Blood Gluc Sensor (DEXCOM G6 SENSOR) MISC 1 each See Admin Instructions Change sensor every 10 days. 9 each 3     Continuous Blood Gluc Transmit (DEXCOM G6 TRANSMITTER) MISC 1 each every 3 months 1 each 3     insulin aspart (NOVOLOG VIAL) 100 UNITS/ML vial Use as directed via insulin pump. Total daily dose approx 80 units. 80 mL 3     INSULIN PUMP - OUTPATIENT Date Last Updated: 1/25/2021  Tandem  Pump Website Username: [email or web address]  Pump Website Password: [email or web address]       lisinopril (ZESTRIL) 5 MG tablet Take 1 tablet (5 mg) by mouth daily 90 tablet 3     ONETOUCH DELICA LANCETS 33G MISC 1 Box 6 times daily 100 each 12     SYNTHROID 125 MCG tablet Synthroid 125 mcg,  1 pill for 6-day and half a pill 1 day a week 90 tablet 3     glucagon (GLUCAGON EMERGENCY) 1 MG kit Inject 1 mg into the muscle once for 1 dose 1 mg 0     insulin aspart (NOVOLOG FLEXPEN) 100 UNIT/ML pen USE 1 UNIT PER 10 GM CARB WITH BREAKFAST AND LUNCH AND 1 UNIT PER 8 GM CARB W/DINNER -=50UNITS/DAY 45 mL 3     insulin degludec (TRESIBA FLEXTOUCH) 200 UNIT/ML pen INJECT 30 UNITS SUBCUTANEOUS DAILY (Patient not taking: Reported on 10/25/2021) 15 mL 3     insulin pen needle (B-D U/F) 31G X 5 MM miscellaneous USE 4 DAILY OR AS DIRECTED. (Patient not taking: Reported on 10/25/2021) 400 each 3     Family History  Type 1 diabetes in her father, mother, paternal aunt, paternal uncle, brother and sister  Type 2 diabetes in her grandmother  Heart in her maternal grandfather and maternal grandmother  hypothyroid in her brother, father, maternal grandfather, maternal grandmother, mother, and sister.     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    ROS  10 points ROS were negative otherwise mentioned in HPI    Physical Exam  Vital signs:   Limited due to virtual visit  Constitutional: no distress, comfortable, pleasant   Skin: no jaundice    Neurological: normal gait, intact sensation per monofilament test bilaterally (exam 2/4/19), no tremor  Psychological: appropriate mood     RESULTS    Lab Results   Component Value Date    A1C 7.2 04/26/2021    A1C 7.9 12/15/2020    A1C 7.7 06/23/2020    A1C 7.4 10/07/2019    A1C 7.2 06/10/2019       ENDO DIABETES Latest Ref Rng & Units 12/15/2020   CHOLESTEROL <200 mg/dL 175   LDL CHOLESTEROL, CALCULATED <100 mg/dL 85   LDL CHOLESTEROL DIRECT <100 mg/dL    HDL CHOLESTEROL >49 mg/dL 73   VLDL-CHOLESTEROL 0 - 30 mg/dL    NON HDL CHOLESTEROL <130 mg/dL 102   TRIGLYCERIDES <150 mg/dL 85   ALBUMIN URINE MG/L mg/L 8   ALBUMIN URINE MG/G CR 0 - 25 mg/g Cr 8.16   CREATININE 0.52 - 1.04 mg/dL 0.66      Ref. Range 2/5/2016 11:41   Glutamic Acid Decarboxylase Antibody Unknown 45.7 (H)   IA-2 Antibody Unknown 3.4 (H)   Islet Cell Antibody IgG Unknown <1:4...      Ref. Range 2/10/2016 08:35   C-Peptide Latest Ref Range: 0.9-6.9 ng/mL 0.7 (L)       ASSESSMENT:    Ms Brigitte Robles is a 45 years old female who has VDO visit for follow up type 1 diabetes and hypothyroidism    1) type 1 diabetes: diagnosed type 1 diabetes in Feb 2016. A1c was 7.2%. She usually has postprandial hyperglycemia. No more hypoglycemia.     Plan:  - basal   MN 1.1 unit/hr  3 AM 1.3 unit/hr  9 PM 1.2 unit/hr    - ICR  MN: 1 unit per 8 gram  3 AM: 1 unit per 7 gram  9 PM: 1 unit per 7 gram    - correction factor of 1 unit per 45 above 110 mg/dl at 3 am and 10 pm  - continue using Dexcom  - will check lab this year    2) Hypothyroidism:clinically euthyroid. Lab returned normal in Nov 2020. Continue Synthroid 125 mcg x6 days per week and 1/2 pill for 1 day per week.  - will check lab    3) Obesity: BMI 34. Encourage to continue on healthy diet and exercise. She is currently trying Weight Watcher    PLAN:   - change bolus rate as above  - continue current dose of Synthroid  - lab for A1c, TSH, FT4, lipid panel, urine microalb, Cr  - RTC 6 months     VDO  start 0147 pm  VDO end 0155 pm  Duration: 8 minutes    External notes/medical records independently reviewed, labs and imaging independently reviewed, medical management and tests to be discussed/communicated to patient.    Time: I spent 31 minutes on the date of the encounter preparing to see patient (including chart review and preparation), obtaining and or reviewing additional medical history, performing a physical exam and evaluation, documenting clinical information in the electronic health record, independently interpreting results, communicating results to the patient and coordinating care.    Lloyd Ledesma MD  Endocrinology  [phone]

## 2021-10-25 NOTE — LETTER
10/25/2021         RE: Brigitte Robles  [address] [facility] [id number]        Dear Colleague,    Thank you for referring your patient, Brigitte Robles, to the [facility]. Please see a copy of my visit note below.    Outcome for 10/25/21 9:11 AM :BG report sent to provider email.    Lucero Lopez [facility]  Adult Endocrinology  [facility]    Outcome for 10/22/21 11:06 AM : Patient is sharing data via clinic device website and available for Dexcom    Nisreen SAMPSON MA   Adult Endocrine   [facility]      Brigitte is a 46 year old who is being evaluated via a billable video visit.      How would you like to obtain your AVS? MyChart  If the video visit is dropped, the invitation should be resent by: Text to cell phone: [phone]  Will anyone else be joining your video visit? No    Marielena Matthew [facility]  Adult Endocrinology  [facility]          Endocrinology Note         Brigitte is a 46 year old female who has VDO visit for type 1 diabetes.    HPI  Ms Brigitte Robles is a 46 years old female who has VDO visit for type 1 diabetes. Last seen 4/2021    She was noted to have increased blurred vision and fatigue in February 2016 and noted to have blood glucose of 300s and A1C of 7.0%. She was initially diagnosed with type 2 diabetes and was started on metformin 500 mg bid which did not control her blood glucose. She was then seen by her PCP who ran more blood test and noted for low c-peptide and positive MONTSERRAT ab and IA2 antibody. She was then started on insulin since.    Interval history:  She will have hysterectomy this November due to heavy menses and anemia.    She is currently using Tandem and Dexcom and is very pleased with it.    1) type 1 diabetes:  A1c is 7.2% (4/2021). Her pattern is postprandial hyperglycemia after lunch and dinner. She has not had much of hypoglycemia anymore after switching to the  pump.    Reviewed Dexcom and Tandem. She is using Control IQ.         Pump setting      2) hypothyroidism: She has history of hypothyroidism for the past 11 years. It was diagnosed about 6 months postpartum. She is currently on Synthroid 125 mcg daily for 6 days and 1/2 pill for 1 day per week.   Lab on 12/15/2020 showed TSH 2.07, FT4 1.22.    Past Medical History  Type 1 diabetes  Hypothyroidism    Allergies  Allergies   Allergen Reactions     Levothyroxine      Dizzy to generic --- not to synthroid     Medications  Current Outpatient Medications   Medication Sig Dispense Refill     atorvastatin (LIPITOR) 10 MG tablet Take 1 tablet (10 mg) by mouth daily 90 tablet 3     blood glucose monitoring (ONETOUCH VERIO IQ) test strip Use to test blood sugar 10 times daily or as directed. 900 strip 3     citalopram (CELEXA) 20 MG tablet Take 1 tablet (20 mg) by mouth daily 90 tablet 3     Continuous Blood Gluc  (DEXCOM G6 ) MAGGY 1 Device continuous 1 Device 0     Continuous Blood Gluc Sensor (DEXCOM G6 SENSOR) MISC 1 each See Admin Instructions Change sensor every 10 days. 9 each 3     Continuous Blood Gluc Transmit (DEXCOM G6 TRANSMITTER) MISC 1 each every 3 months 1 each 3     insulin aspart (NOVOLOG VIAL) 100 UNITS/ML vial Use as directed via insulin pump. Total daily dose approx 80 units. 80 mL 3     INSULIN PUMP - OUTPATIENT Date Last Updated: 1/25/2021  Tandem  Pump Website Username: [email or web address]  Pump Website Password: [email or web address]       lisinopril (ZESTRIL) 5 MG tablet Take 1 tablet (5 mg) by mouth daily 90 tablet 3     ONETOUCH DELICA LANCETS 33G MISC 1 Box 6 times daily 100 each 12     SYNTHROID 125 MCG tablet Synthroid 125 mcg,  1 pill for 6-day and half a pill 1 day a week 90 tablet 3     glucagon (GLUCAGON EMERGENCY) 1 MG kit Inject 1 mg into the muscle once for 1 dose 1 mg 0     insulin aspart (NOVOLOG FLEXPEN) 100 UNIT/ML pen USE 1 UNIT PER 10 GM CARB WITH BREAKFAST AND  LUNCH AND 1 UNIT PER 8 GM CARB W/DINNER -=50UNITS/DAY 45 mL 3     insulin degludec (TRESIBA FLEXTOUCH) 200 UNIT/ML pen INJECT 30 UNITS SUBCUTANEOUS DAILY (Patient not taking: Reported on 10/25/2021) 15 mL 3     insulin pen needle (B-D U/F) 31G X 5 MM miscellaneous USE 4 DAILY OR AS DIRECTED. (Patient not taking: Reported on 10/25/2021) 400 each 3     Family History  Type 1 diabetes in her father, mother, paternal aunt, paternal uncle, brother and sister  Type 2 diabetes in her grandmother  Heart in her maternal grandfather and maternal grandmother  hypothyroid in her brother, father, maternal grandfather, maternal grandmother, mother, and sister.     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    ROS  10 points ROS were negative otherwise mentioned in HPI    Physical Exam  Vital signs:   Limited due to virtual visit  Constitutional: no distress, comfortable, pleasant   Skin: no jaundice   Neurological: normal gait, intact sensation per monofilament test bilaterally (exam 2/4/19), no tremor  Psychological: appropriate mood     RESULTS    Lab Results   Component Value Date    A1C 7.2 04/26/2021    A1C 7.9 12/15/2020    A1C 7.7 06/23/2020    A1C 7.4 10/07/2019    A1C 7.2 06/10/2019       ENDO DIABETES Latest Ref Rng & Units 12/15/2020   CHOLESTEROL <200 mg/dL 175   LDL CHOLESTEROL, CALCULATED <100 mg/dL 85   LDL CHOLESTEROL DIRECT <100 mg/dL    HDL CHOLESTEROL >49 mg/dL 73   VLDL-CHOLESTEROL 0 - 30 mg/dL    NON HDL CHOLESTEROL <130 mg/dL 102   TRIGLYCERIDES <150 mg/dL 85   ALBUMIN URINE MG/L mg/L 8   ALBUMIN URINE MG/G CR 0 - 25 mg/g Cr 8.16   CREATININE 0.52 - 1.04 mg/dL 0.66      Ref. Range 2/5/2016 11:41   Glutamic Acid Decarboxylase Antibody Unknown 45.7 (H)   IA-2 Antibody Unknown 3.4 (H)   Islet Cell Antibody IgG Unknown <1:4...      Ref. Range 2/10/2016 08:35   C-Peptide Latest Ref Range: 0.9-6.9 ng/mL 0.7 (L)       ASSESSMENT:    Ms Briggs  Travis is a 45 years old female who has VDO visit for follow up type 1 diabetes and hypothyroidism    1) type 1 diabetes: diagnosed type 1 diabetes in Feb 2016. A1c was 7.2%. She usually has postprandial hyperglycemia. No more hypoglycemia.     Plan:  - basal   MN 1.1 unit/hr  3 AM 1.3 unit/hr  9 PM 1.2 unit/hr    - ICR  MN: 1 unit per 8 gram  3 AM: 1 unit per 7 gram  9 PM: 1 unit per 7 gram    - correction factor of 1 unit per 45 above 110 mg/dl at 3 am and 10 pm  - continue using Dexcom  - will check lab this year    2) Hypothyroidism:clinically euthyroid. Lab returned normal in Nov 2020. Continue Synthroid 125 mcg x6 days per week and 1/2 pill for 1 day per week.  - will check lab    3) Obesity: BMI 34. Encourage to continue on healthy diet and exercise. She is currently trying Weight Watcher    PLAN:   - change bolus rate as above  - continue current dose of Synthroid  - lab for A1c, TSH, FT4, lipid panel, urine microalb, Cr  - RTC 6 months     VDO start 0147 pm  VDO end 0155 pm  Duration: 8 minutes    External notes/medical records independently reviewed, labs and imaging independently reviewed, medical management and tests to be discussed/communicated to patient.    Time: I spent 31 minutes on the date of the encounter preparing to see patient (including chart review and preparation), obtaining and or reviewing additional medical history, performing a physical exam and evaluation, documenting clinical information in the electronic health record, independently interpreting results, communicating results to the patient and coordinating care.    Lloyd Ledesma MD  Endocrinology  [phone]        Again, thank you for allowing me to participate in the care of your patient.        Sincerely,        Lloyd Ledesma MD

## 2021-10-25 NOTE — PROGRESS NOTES
Brigitte is a 46 year old who is being evaluated via a billable video visit.      How would you like to obtain your AVS? MyChart  If the video visit is dropped, the invitation should be resent by: Text to cell phone: [phone]  Will anyone else be joining your video visit? No    Marielena Matthew CMA  Adult Endocrinology  [facility]

## 2021-11-03 DIAGNOSIS — E10.9 TYPE 1 DIABETES MELLITUS WITHOUT COMPLICATION (H): ICD-10-CM

## 2021-11-03 NOTE — TELEPHONE ENCOUNTER
NOVOLOG 100 UNIT/ML VIAL  Last Written Prescription Date:  11/17/2020  Last Fill Quantity: 80,   # refills: 3  Last Office Visit : 10/25/2021  Future Office visit:  None    Routing refill request to provider for review/approval because:  Drug not on the [facility], [facility] or [facility] refill protocol or controlled substance    Anastasiya Barksdale RN  Central Triage Red Flags/Med Refills

## 2021-11-04 ENCOUNTER — OFFICE VISIT (OUTPATIENT)
Dept: FAMILY MEDICINE | Facility: CLINIC | Age: 46
End: 2021-11-04
Payer: COMMERCIAL

## 2021-11-04 VITALS
BODY MASS INDEX: 36.8 KG/M2 | SYSTOLIC BLOOD PRESSURE: 114 MMHG | TEMPERATURE: 97.7 F | OXYGEN SATURATION: 97 % | WEIGHT: 228 LBS | DIASTOLIC BLOOD PRESSURE: 74 MMHG | HEART RATE: 90 BPM

## 2021-11-04 DIAGNOSIS — N92.1 METRORRHAGIA: ICD-10-CM

## 2021-11-04 DIAGNOSIS — F41.1 GAD (GENERALIZED ANXIETY DISORDER): ICD-10-CM

## 2021-11-04 DIAGNOSIS — E10.9 TYPE 1 DIABETES MELLITUS WITHOUT COMPLICATION (H): ICD-10-CM

## 2021-11-04 DIAGNOSIS — E06.3 HYPOTHYROIDISM DUE TO HASHIMOTO'S THYROIDITIS: ICD-10-CM

## 2021-11-04 DIAGNOSIS — Z01.818 PREOP GENERAL PHYSICAL EXAM: Primary | ICD-10-CM

## 2021-11-04 LAB — HBA1C MFR BLD: 7.5 % (ref 0–5.6)

## 2021-11-04 PROCEDURE — 36415 COLL VENOUS BLD VENIPUNCTURE: CPT | Performed by: PHYSICIAN ASSISTANT

## 2021-11-04 PROCEDURE — 99214 OFFICE O/P EST MOD 30 MIN: CPT | Performed by: PHYSICIAN ASSISTANT

## 2021-11-04 PROCEDURE — 83036 HEMOGLOBIN GLYCOSYLATED A1C: CPT | Performed by: PHYSICIAN ASSISTANT

## 2021-11-04 ASSESSMENT — PAIN SCALES - GENERAL: PAINLEVEL: NO PAIN (0)

## 2021-11-04 NOTE — PATIENT INSTRUCTIONS

## 2021-11-04 NOTE — NURSING NOTE
"Chief Complaint   Patient presents with     Pre-Op Exam       Initial /74   Pulse 90   Temp 97.7  F (36.5  C) (Tympanic)   Wt 103.4 kg (228 lb)   SpO2 97%   BMI 36.80 kg/m   Estimated body mass index is 36.8 kg/m  as calculated from the following:    Height as of 9/21/21: 1.676 m (5' 6\").    Weight as of this encounter: 103.4 kg (228 lb).  Medication Reconciliation: complete    AUDRA Vera MA    "

## 2021-11-04 NOTE — PROGRESS NOTES
[facility]  [address] [facility] [address]  Phone: [phone]  Primary Provider: Kehr, Kristen M  Pre-op Performing Provider: KEHR, KRISTEN M      PREOPERATIVE EVALUATION:  Today's date: 11/4/2021    Brigitte Robles is a 46 year old female who presents for a preoperative evaluation.    Surgical Information:  Surgery/Procedure: Hysterectomy  Surgery Location:  OR  Surgeon:   Surgery Date: 11/11/21  Time of Surgery: 3:30pm  Where patient plans to recover: At home with family  Fax number for surgical facility: Note does not need to be faxed, will be available electronically in Epic.    Type of Anesthesia Anticipated: to be determined    Assessment & Plan     The proposed surgical procedure is considered LOW risk.    Preop general physical exam  Metrorrhagia  - Hemoglobin A1c    Type 1 diabetes mellitus without complication (H)  She has an insulin pump and will be talking with her Endocrine provider about any adjustments prior to her procedure    MONTSERRAT (generalized anxiety disorder)  Stable    Hypothyroidism due to Hashimoto's thyroiditis  stable           Risks and Recommendations:  The patient has the following additional risks and recommendations for perioperative complications:   - No identified additional risk factors other than previously addressed    Medication Instructions:  Patient is to take all scheduled medications on the day of surgery  and will be contacting her Endocrinology provider about her insulin adjustment    RECOMMENDATION:  APPROVAL GIVEN to proceed with proposed procedure, without further diagnostic evaluation.                      Subjective     HPI related to upcoming procedure: Brigitte has heavy menses and is anemic and will be having a hysterectomy    Preop Questions 11/2/2021   1. Have you ever had a heart attack or stroke? No   2. Have you ever had surgery on your heart or blood vessels, such as a stent placement, a coronary artery bypass, or surgery  on an artery in your head, neck, heart, or legs? No   3. Do you have chest pain with activity? No   4. Do you have a history of  heart failure? No   5. Do you currently have a cold, bronchitis or symptoms of other infection? No   6. Do you have a cough, shortness of breath, or wheezing? No   7. Do you or anyone in your family have previous history of blood clots? No   8. Do you or does anyone in your family have a serious bleeding problem such as prolonged bleeding following surgeries or cuts? No   9. Have you ever had problems with anemia or been told to take iron pills? YES - iron pills    10. Have you had any abnormal blood loss such as black, tarry or bloody stools, or abnormal vaginal bleeding? YES - abnormal vaginal bleeding   11. Have you ever had a blood transfusion? No   12. Are you willing to have a blood transfusion if it is medically needed before, during, or after your surgery? Yes   13. Have you or any of your relatives ever had problems with anesthesia? No   14. Do you have sleep apnea, excessive snoring or daytime drowsiness? No   15. Do you have any artifical heart valves or other implanted medical devices like a pacemaker, defibrillator, or continuous glucose monitor? No   16. Do you have artificial joints? No   17. Are you allergic to latex? No   18. Is there any chance that you may be pregnant? No       Health Care Directive:  Patient does not have a Health Care Directive or Living Will: Discussed advance care planning with patient; however, patient declined at this time.    Preoperative Review of :   reviewed - no record of controlled substances prescribed.      Status of Chronic Conditions:  DIABETES - Patient has a longstanding history of DiabetesType Type I . Patient is being treated with insulin pump and denies significant side effects. Control has been good. Complicating factors include but are not limited to: hyperlipidemia and morbid obesity .     HYPERLIPIDEMIA - Patient has a long  history of significant Hyperlipidemia requiring medication for treatment with recent good control. Patient reports no problems or side effects with the medication.     HYPOTHYROIDISM - Patient has a longstanding history of chronic Hypothyroidism. Patient has been doing well, noting no tremor, insomnia, hair loss or changes in skin texture. Continues to take medications as directed, without adverse reactions or side effects. Last TSH   Lab Results   Component Value Date    TSH 2.07 12/15/2020   .        Review of Systems  Constitutional, neuro, ENT, endocrine, pulmonary, cardiac, gastrointestinal, genitourinary, musculoskeletal, integument and psychiatric systems are negative, except as otherwise noted.    Patient Active Problem List    Diagnosis Date Noted     MONTSERRAT (generalized anxiety disorder) 09/22/2021     Priority: Medium     Mild major depression (H) 09/22/2021     Priority: Medium     Metrorrhagia 09/22/2021     Priority: Medium     Morbid obesity (H) 09/21/2021     Priority: Medium     Type 1 diabetes mellitus without complication (H) 02/17/2016     Priority: Medium     Seasonal allergic rhinitis 05/21/2012     Priority: Medium     CARDIOVASCULAR SCREENING; LDL GOAL LESS THAN 160 10/31/2010     Priority: Medium     Anxiety      Priority: Medium     GLUCOCORTICOID DEFICIENT 08/12/2009     Priority: Medium     Hypothyroidism 06/17/2003     Priority: Medium     Problem list name updated by automated process. Provider to review       Depressive disorder, not elsewhere classified 02/08/2003     Priority: Medium      Past Medical History:   Diagnosis Date     Anxiety      DEPRESSIVE DISORDER NEC 2/8/2003     Type 1 diabetes mellitus without complication (H) 2/17/2016     Unspecified hypothyroidism 6/2003     Past Surgical History:   Procedure Laterality Date     HC REMOVAL OF TONSILS,13+ Y/O       Current Outpatient Medications   Medication Sig Dispense Refill     atorvastatin (LIPITOR) 10 MG tablet Take 1 tablet  (10 mg) by mouth daily 90 tablet 3     blood glucose monitoring (ONETOUCH VERIO IQ) test strip Use to test blood sugar 10 times daily or as directed. 900 strip 3     citalopram (CELEXA) 20 MG tablet Take 1 tablet (20 mg) by mouth daily 90 tablet 3     Continuous Blood Gluc  (DEXCOM G6 ) MAGGY 1 Device continuous 1 Device 0     Continuous Blood Gluc Sensor (DEXCOM G6 SENSOR) MISC 1 each See Admin Instructions Change sensor every 10 days. 9 each 3     Continuous Blood Gluc Transmit (DEXCOM G6 TRANSMITTER) MISC 1 each every 3 months 1 each 3     insulin aspart (NOVOLOG FLEXPEN) 100 UNIT/ML pen USE 1 UNIT PER 10 GM CARB WITH BREAKFAST AND LUNCH AND 1 UNIT PER 8 GM CARB W/DINNER -=50UNITS/DAY 45 mL 3     insulin aspart (NOVOLOG VIAL) 100 UNITS/ML vial USE AS DIRECTED VIA INSULIN PUMP. TOTAL DAILY DOSE APPROX 80 UNITS. 70 mL 3     insulin degludec (TRESIBA FLEXTOUCH) 200 UNIT/ML pen INJECT 30 UNITS SUBCUTANEOUS DAILY 15 mL 3     insulin pen needle (B-D U/F) 31G X 5 MM miscellaneous USE 4 DAILY OR AS DIRECTED. 400 each 3     INSULIN PUMP - OUTPATIENT Date Last Updated: 1/25/2021  [employer]  Pump Website Username: [email or web address]  Pump Website Password: [email or web address]       lisinopril (ZESTRIL) 5 MG tablet Take 1 tablet (5 mg) by mouth daily 90 tablet 3     ONETOUCH DELICA LANCETS 33G MISC 1 Box 6 times daily 100 each 12     SYNTHROID 125 MCG tablet Synthroid 125 mcg,  1 pill for 6-day and half a pill 1 day a week 90 tablet 3     glucagon (GLUCAGON EMERGENCY) 1 MG kit Inject 1 mg into the muscle once for 1 dose 1 mg 0       Allergies   Allergen Reactions     Levothyroxine      Dizzy to generic --- not to synthroid        Social History     Tobacco Use     Smoking status: Never Smoker     Smokeless tobacco: Never Used   Substance Use Topics     Alcohol use: Yes     Comment: rare     Family History   Problem Relation Age of Onset     Diabetes Mother      Hypertension Mother      Thyroid Disease  Mother      Diabetes Father         1     Thyroid Disease Father         BORIS'S     Diabetes Sister      Thyroid Disease Sister      Heart Disease Maternal Grandmother         TRIPLE BYPASS     Thyroid Disease Maternal Grandmother      Diabetes Maternal Grandmother         2     Heart Disease Maternal Grandfather         TRIPLE BYPASS     Coronary Artery Disease Maternal Grandfather      Thyroid Disease Maternal Grandfather      Thyroid Disease Brother      Diabetes Paternal Aunt      Diabetes Paternal Uncle      Diabetes Brother         1     Thyroid Disease Brother      Diabetes Nephew         1     Depression Sister      Thyroid Disease Sister      Diabetes Sister         1     Breast Cancer No family hx of      Cancer - colorectal No family hx of      History   Drug Use No         Objective     /74   Pulse 90   Temp 97.7  F (36.5  C) (Tympanic)   Wt 103.4 kg (228 lb)   SpO2 97%   BMI 36.80 kg/m      Physical Exam    GENERAL APPEARANCE: healthy, alert and no distress     EYES: EOMI, PERRL     HENT: ear canals and TM's normal and nose and mouth without ulcers or lesions     NECK: no adenopathy, no asymmetry, masses, or scars and thyroid normal to palpation     RESP: lungs clear to auscultation - no rales, rhonchi or wheezes     CV: regular rates and rhythm, normal S1 S2, no S3 or S4 and no murmur, click or rub     ABDOMEN:  soft, nontender, no HSM or masses and bowel sounds normal     MS: extremities normal- no gross deformities noted, no evidence of inflammation in joints, FROM in all extremities.     SKIN: no suspicious lesions or rashes     NEURO: Normal strength and tone, sensory exam grossly normal, mentation intact and speech normal     PSYCH: mentation appears normal. and affect normal/bright     LYMPHATICS: No cervical adenopathy    Recent Labs   Lab Test 09/21/21  1338 04/26/21  0000 12/15/20  1624   HGB 11.1*  --   --    *  --   --    CR  --   --  0.66   A1C  --  7.2* 7.9*         Diagnostics:  Recent Results (from the past 24 hour(s))   Hemoglobin A1c    Collection Time: 11/04/21  9:15 AM   Result Value Ref Range    Hemoglobin A1C 7.5 (H) 0.0 - 5.6 %          Revised Cardiac Risk Index (RCRI):  The patient has the following serious cardiovascular risks for perioperative complications:   - No serious cardiac risks = 0 points     RCRI Interpretation: 0 points: Class I (very low risk - 0.4% complication rate)           Signed Electronically by: Kristen M. Kehr, PA-C  Copy of this evaluation report is provided to requesting physician.

## 2021-12-14 ENCOUNTER — OFFICE VISIT (OUTPATIENT)
Dept: OBGYN | Facility: CLINIC | Age: 46
End: 2021-12-14
Payer: COMMERCIAL

## 2021-12-14 VITALS
HEART RATE: 77 BPM | SYSTOLIC BLOOD PRESSURE: 145 MMHG | BODY MASS INDEX: 37.51 KG/M2 | DIASTOLIC BLOOD PRESSURE: 84 MMHG | WEIGHT: 232.4 LBS

## 2021-12-14 DIAGNOSIS — E10.65 TYPE 1 DIABETES MELLITUS WITH HYPERGLYCEMIA (H): ICD-10-CM

## 2021-12-14 DIAGNOSIS — Z98.890 POSTOPERATIVE STATE: Primary | ICD-10-CM

## 2021-12-14 DIAGNOSIS — E03.9 HYPOTHYROIDISM, UNSPECIFIED TYPE: ICD-10-CM

## 2021-12-14 LAB
CHOLEST SERPL-MCNC: 183 MG/DL
CREAT SERPL-MCNC: 0.92 MG/DL (ref 0.52–1.04)
CREAT UR-MCNC: 241 MG/DL
FASTING STATUS PATIENT QL REPORTED: YES
GFR SERPL CREATININE-BSD FRML MDRD: 75 ML/MIN/1.73M2
HBA1C MFR BLD: 7.6 % (ref 0–5.6)
HDLC SERPL-MCNC: 76 MG/DL
LDLC SERPL CALC-MCNC: 85 MG/DL
MICROALBUMIN UR-MCNC: 10 MG/L
MICROALBUMIN/CREAT UR: 4.15 MG/G CR (ref 0–25)
NONHDLC SERPL-MCNC: 107 MG/DL
T4 FREE SERPL-MCNC: 1.03 NG/DL (ref 0.76–1.46)
TRIGL SERPL-MCNC: 111 MG/DL
TSH SERPL DL<=0.005 MIU/L-ACNC: 2 MU/L (ref 0.4–4)

## 2021-12-14 PROCEDURE — 82565 ASSAY OF CREATININE: CPT | Performed by: OBSTETRICS & GYNECOLOGY

## 2021-12-14 PROCEDURE — 99024 POSTOP FOLLOW-UP VISIT: CPT | Performed by: OBSTETRICS & GYNECOLOGY

## 2021-12-14 PROCEDURE — 84443 ASSAY THYROID STIM HORMONE: CPT | Performed by: OBSTETRICS & GYNECOLOGY

## 2021-12-14 PROCEDURE — 82043 UR ALBUMIN QUANTITATIVE: CPT | Performed by: OBSTETRICS & GYNECOLOGY

## 2021-12-14 PROCEDURE — 36415 COLL VENOUS BLD VENIPUNCTURE: CPT | Performed by: OBSTETRICS & GYNECOLOGY

## 2021-12-14 PROCEDURE — 83036 HEMOGLOBIN GLYCOSYLATED A1C: CPT | Performed by: OBSTETRICS & GYNECOLOGY

## 2021-12-14 PROCEDURE — 80061 LIPID PANEL: CPT | Performed by: OBSTETRICS & GYNECOLOGY

## 2021-12-14 PROCEDURE — 84439 ASSAY OF FREE THYROXINE: CPT | Performed by: OBSTETRICS & GYNECOLOGY

## 2021-12-14 NOTE — PROGRESS NOTES
SUBJECTIVE:       HPI: Brigitte Robles is a 46 year old  is s/p Total Laparoscopic Hysterectomy, bilateral salpingectomy, cystoscopy on 21 for AUB. Since surgery, she has been doing well. She has had minimal vaginal bleeding- only after increased activity. She has not been sexually active. No other complaints    Ob Hx:     Gyn Hx: Patient's last menstrual period was 2021 (approximate).     Last pap was 21 nil, neg hpv, Remote history of abnormal paps without high grade lesion. Next pap due - NA         Today's PHQ-2 Score:   PHQ-2 (  [employer]) 2021   Q1: Little interest or pleasure in doing things 0   Q2: Feeling down, depressed or hopeless 0   PHQ-2 Score 0   PHQ-2 Total Score (12-17 Years)- Positive if 3 or more points; Administer PHQ-A if positive 0   Q1: Little interest or pleasure in doing things Not at all   Q2: Feeling down, depressed or hopeless Not at all   PHQ-2 Score 0     Today's PHQ-9 Score:   PHQ-9 SCORE 2021   PHQ-9 Total Score -   PHQ-9 Total Score MyChart -   PHQ-9 Total Score 0     Today's MONTSERRAT-7 Score:   MONTSERRAT-7 SCORE 2021   Total Score -   Total Score -   Total Score 0       Problem list and histories reviewed & adjusted, as indicated.  Additional history: as documented.    Patient Active Problem List   Diagnosis     Depressive disorder, not elsewhere classified     Hypothyroidism     GLUCOCORTICOID DEFICIENT     Anxiety     CARDIOVASCULAR SCREENING; LDL GOAL LESS THAN 160     Seasonal allergic rhinitis     Type 1 diabetes mellitus without complication (H)     Morbid obesity (H)     MONTSERRAT (generalized anxiety disorder)     Mild major depression (H)     Metrorrhagia     Past Surgical History:   Procedure Laterality Date     HC REMOVAL OF TONSILS,12+ Y/O        Social History     Tobacco Use     Smoking status: Never Smoker     Smokeless tobacco: Never Used   Substance Use Topics     Alcohol use: Yes     Comment: rare      Problem (# of Occurrences)  Relation (Name,Age of Onset)    Coronary Artery Disease (1) Maternal Grandfather (mAan Conte)    Depression (1) Sister (mrs)    Diabetes (9) Mother (Rosio Hampton), Father (Pedro Hampton): 1, Sister, Maternal Grandmother (Lucía Conte): 2, Paternal Aunt, Paternal Uncle, Brother (Elgin Hampton): 1, Nephew (Salo Hampton): 1, Sister (mrs): 1    Heart Disease (2) Maternal Grandmother (Lucía Conte): TRIPLE BYPASS, Maternal Grandfather (Aman Conte): TRIPLE BYPASS    Hypertension (1) Mother (Rosio Hampton)    Thyroid Disease (8) Mother (Rosio Hampton), Father (Pedro Hampton): BORIS'S, Sister, Maternal Grandmother (Lucía Conte), Maternal Grandfather (Aman Conte), Brother (mr), Brother (Elgin Hampton), Sister (mrs)       Negative family history of: Breast Cancer, Cancer - colorectal            atorvastatin (LIPITOR) 10 MG tablet, Take 1 tablet (10 mg) by mouth daily  blood glucose monitoring (ONETOUCH VERIO IQ) test strip, Use to test blood sugar 10 times daily or as directed.  citalopram (CELEXA) 20 MG tablet, Take 1 tablet (20 mg) by mouth daily  Continuous Blood Gluc  (DEXCOM G6 ) MAGGY, 1 Device continuous  Continuous Blood Gluc Sensor (DEXCOM G6 SENSOR) MISC, 1 each See Admin Instructions Change sensor every 10 days.  Continuous Blood Gluc Transmit (DEXCOM G6 TRANSMITTER) MISC, 1 each every 3 months  insulin aspart (NOVOLOG FLEXPEN) 100 UNIT/ML pen, USE 1 UNIT PER 10 GM CARB WITH BREAKFAST AND LUNCH AND 1 UNIT PER 8 GM CARB W/DINNER -=50UNITS/DAY  insulin aspart (NOVOLOG VIAL) 100 UNITS/ML vial, USE AS DIRECTED VIA INSULIN PUMP. TOTAL DAILY DOSE APPROX 80 UNITS.  insulin degludec (TRESIBA FLEXTOUCH) 200 UNIT/ML pen, INJECT 30 UNITS SUBCUTANEOUS DAILY  insulin pen needle (B-D U/F) 31G X 5 MM miscellaneous, USE 4 DAILY OR AS DIRECTED.  INSULIN PUMP - OUTPATIENT, Date Last Updated: 1/25/2021  [employer]  Pump Website Username: [email or web address]  Pump Website Password:  [email or web address]  lisinopril (ZESTRIL) 5 MG tablet, Take 1 tablet (5 mg) by mouth daily  ONETOUCH DELICA LANCETS 33G MISC, 1 Box 6 times daily  SYNTHROID 125 MCG tablet, Synthroid 125 mcg,  1 pill for 6-day and half a pill 1 day a week  glucagon (GLUCAGON EMERGENCY) 1 MG kit, Inject 1 mg into the muscle once for 1 dose    No current facility-administered medications on file prior to visit.    Allergies   Allergen Reactions     Levothyroxine      Dizzy to generic --- not to synthroid       ROS:  10 Point review of systems negative other noted above in HPI    OBJECTIVE:     BP (!) 145/84   Pulse 77   Wt 105.4 kg (232 lb 6.4 oz)   LMP 2021 (Approximate)   BMI 37.51 kg/m    Body mass index is 37.51 kg/m .    Gen: Alert, oriented, appropriately interactive, NAD  Chest: Symmetrical, unlabored breathing  Abdomen: soft, non tender, non distended  External genitalia: no lesions; normal appearing external genitalia, bartholins glands, urethra, skenes glands  Vagina: no masses or lesions or discharge, normally rugated. Vaginal cuff intact, without masses, bleeding or discharge.  Cervix: Surgically absent   Bimanual exam:   Nontender pelvic floor muscles  Urethra: nontender   Bladder: nontender and without massess, well supported   Uterus: Surgically absent  Adnexa: Adnexa: no masses or tenderness appreciated   MSK: normal gait, symmetric movements UE & LE  Lower extremities: non-tender, no edema        In-Clinic Test Results:  No results found for this or any previous visit (from the past 24 hour(s)).     Final Diagnosis   Uterus, cervix and fallopian tubes, hysterectomy and bilateral salpingectomy -  1.  Secretory endometrium.  2.  Bilateral paratubal cysts.  3.  Benign endocervical polyp.         ASSESSMENT/PLAN:                                                      Brigitte Robles is a 46 year old  s/p Total Laparoscopic Hysterectomy, bilateral salpingectomy, cystoscopy on 21 for AUB      ICD-10-CM    1.  Postoperative state  Z98.890        Doing well postoperatively. Minimal vaginal bleeding since surgery. Not yet sexually active.    Cleared for routine to normal activity, including intercourse, in 2 weeks.   Screening pap smears no longer indicated.  Concerning s/s reviewed, discussed when to call or return to clinic. All questions answered.        Mireya Harman DO  [facility]

## 2022-02-04 DIAGNOSIS — E06.3 HYPOTHYROIDISM DUE TO HASHIMOTO'S THYROIDITIS: ICD-10-CM

## 2022-02-07 RX ORDER — LEVOTHYROXINE SODIUM 125 MCG
TABLET ORAL
Qty: 90 TABLET | Refills: 1 | Status: SHIPPED | OUTPATIENT
Start: 2022-02-07 | End: 2022-09-28

## 2022-02-17 ENCOUNTER — TELEPHONE (OUTPATIENT)
Dept: EDUCATION SERVICES | Facility: CLINIC | Age: 47
End: 2022-02-17
Payer: COMMERCIAL

## 2022-02-17 DIAGNOSIS — E10.9 TYPE 1 DIABETES MELLITUS WITHOUT COMPLICATION (H): Primary | ICD-10-CM

## 2022-02-17 DIAGNOSIS — E10.9 TYPE 1 DIABETES MELLITUS WITHOUT COMPLICATION (H): ICD-10-CM

## 2022-02-17 DIAGNOSIS — E10.65 TYPE 1 DIABETES MELLITUS WITH HYPERGLYCEMIA (H): ICD-10-CM

## 2022-02-17 RX ORDER — INSULIN DEGLUDEC 200 U/ML
INJECTION, SOLUTION SUBCUTANEOUS
Qty: 15 ML | Refills: 3 | Status: SHIPPED | OUTPATIENT
Start: 2022-02-17 | End: 2023-10-09

## 2022-02-17 NOTE — TELEPHONE ENCOUNTER
Reason for Call:  Other Question before travel    Detailed comments: PT called and had questions about traveling to [place] on Sat.  PT wondering if should know anything different.    Phone Number Patient can be reached at: Home number on file [phone] (home)    Best Time: Anytime, before Sat    Can we leave a detailed message on this number? YES    Call taken on 2/17/2022 at 12:05 PM by Chantel Avelar      I called Brigitte , she is on the [employer] pump and dexcom sensor.  I advised her to call [employer] and get a loaner pump in case hers fails, to be aware of pressure on the plane and forcing more insulin into her system, this is just a precaution, and to have a long acting insulin as backup and have syringes in case she needs to bolus and use Novolog vial if her pump fails.  And to bring enough or more supplies.  Check out the term sleep mode to see how that may help her.  And going through [employer] to have special wanding so as to protect her pump and sensor.  And to carry all medications on the plane with her.      She is leaving on Sat.  Greatful for the advise, sent a message to Dr. Desai for an updated referral as well.    Rosio Knight RN/KIRAN  [place] [facility] Educator

## 2022-03-07 ENCOUNTER — TELEPHONE (OUTPATIENT)
Dept: FAMILY MEDICINE | Facility: CLINIC | Age: 47
End: 2022-03-07
Payer: COMMERCIAL

## 2022-03-07 NOTE — TELEPHONE ENCOUNTER
Patient Quality Outreach      Summary:    Patient has the following on her problem list/HM:     Diabetes    Last A1C:  Lab Results   Component Value Date    A1C 7.6 12/14/2021    A1C 7.5 11/04/2021    A1C 7.2 04/26/2021    A1C 7.9 12/15/2020       Last LDL:    Lab Results   Component Value Date    LDL 85 12/14/2021    LDL 85 12/15/2020       Is the patient on a Statin? Yes          Is the patient on Aspirin? No    Medications     HMG CoA Reductase Inhibitors     atorvastatin (LIPITOR) 10 MG tablet             Last three blood pressure readings:  BP Readings from Last 3 Encounters:   12/14/21 (!) 145/84   11/04/21 114/74   09/28/21 (!) 162/82          Tobacco Use      Smoking status: Never Smoker      Smokeless tobacco: Never Used          Patient is due/failing the following:   Patient see Endocrine for her diabetes.    Type of outreach:    none    Questions for provider review:    None                                                                                                                                     Roge GARCIA MA       Chart routed to close.

## 2022-06-25 ENCOUNTER — HEALTH MAINTENANCE LETTER (OUTPATIENT)
Age: 47
End: 2022-06-25

## 2022-09-20 NOTE — PROGRESS NOTES
Outcome for 09/20/22 1:03 PM: Data uploaded on Dexcom and printed.  Lucero Lopez [facility]  Adult Endocrinology  [facility]

## 2022-09-23 ASSESSMENT — ENCOUNTER SYMPTOMS
BACK PAIN: 1
MUSCLE CRAMPS: 0
JOINT SWELLING: 0
ARTHRALGIAS: 1
MUSCLE WEAKNESS: 0
NECK PAIN: 0
STIFFNESS: 1
MYALGIAS: 0

## 2022-09-24 DIAGNOSIS — E10.9 TYPE 1 DIABETES MELLITUS WITHOUT COMPLICATION (H): ICD-10-CM

## 2022-09-24 DIAGNOSIS — E06.3 HYPOTHYROIDISM DUE TO HASHIMOTO'S THYROIDITIS: ICD-10-CM

## 2022-09-24 NOTE — LETTER
September 26, 2022    Brigitte Robles  [address] [place] [id number]    Dear Brigitte,       We recently received a refill request for lisinopril (ZESTRIL) 5 MG tablet and atorvastatin (LIPITOR) 10 MG tablet.  We have refilled this for a one time 90 day supply only because you are due for a:    Blood Pressure office visit and Fasting lab appointment      Please schedule an office visit with your provider and a lab appointment 4-5 days prior to the office visit.     Please call at your earliest convenience so that there will not be a delay with your future refills.          Thank you,   Your [facility] Team/[place]  [phone]

## 2022-09-26 ENCOUNTER — OFFICE VISIT (OUTPATIENT)
Dept: ENDOCRINOLOGY | Facility: CLINIC | Age: 47
End: 2022-09-26
Payer: COMMERCIAL

## 2022-09-26 VITALS
DIASTOLIC BLOOD PRESSURE: 82 MMHG | HEART RATE: 64 BPM | SYSTOLIC BLOOD PRESSURE: 141 MMHG | OXYGEN SATURATION: 97 % | BODY MASS INDEX: 38.9 KG/M2 | WEIGHT: 241 LBS

## 2022-09-26 DIAGNOSIS — E66.812 CLASS 2 SEVERE OBESITY WITH SERIOUS COMORBIDITY AND BODY MASS INDEX (BMI) OF 36.0 TO 36.9 IN ADULT, UNSPECIFIED OBESITY TYPE (H): ICD-10-CM

## 2022-09-26 DIAGNOSIS — E66.01 CLASS 2 SEVERE OBESITY WITH SERIOUS COMORBIDITY AND BODY MASS INDEX (BMI) OF 36.0 TO 36.9 IN ADULT, UNSPECIFIED OBESITY TYPE (H): ICD-10-CM

## 2022-09-26 DIAGNOSIS — E10.9 TYPE 1 DIABETES MELLITUS WITHOUT COMPLICATION (H): Primary | ICD-10-CM

## 2022-09-26 DIAGNOSIS — Z23 NEED FOR PROPHYLACTIC VACCINATION AND INOCULATION AGAINST INFLUENZA: ICD-10-CM

## 2022-09-26 LAB
ANION GAP SERPL CALCULATED.3IONS-SCNC: 6 MMOL/L (ref 3–14)
BUN SERPL-MCNC: 15 MG/DL (ref 7–30)
CALCIUM SERPL-MCNC: 9.2 MG/DL (ref 8.5–10.1)
CHLORIDE BLD-SCNC: 106 MMOL/L (ref 94–109)
CHOLEST SERPL-MCNC: 199 MG/DL
CO2 SERPL-SCNC: 24 MMOL/L (ref 20–32)
CREAT SERPL-MCNC: 0.74 MG/DL (ref 0.52–1.04)
FASTING STATUS PATIENT QL REPORTED: NO
GFR SERPL CREATININE-BSD FRML MDRD: >90 ML/MIN/1.73M2
GLUCOSE BLD-MCNC: 97 MG/DL (ref 70–99)
HBA1C MFR BLD: 7.5 % (ref 4.3–?)
HDLC SERPL-MCNC: 63 MG/DL
LDLC SERPL CALC-MCNC: 100 MG/DL
NONHDLC SERPL-MCNC: 136 MG/DL
POTASSIUM BLD-SCNC: 3.8 MMOL/L (ref 3.4–5.3)
SODIUM SERPL-SCNC: 136 MMOL/L (ref 133–144)
T4 FREE SERPL-MCNC: 1.01 NG/DL (ref 0.76–1.46)
TRIGL SERPL-MCNC: 178 MG/DL
TSH SERPL DL<=0.005 MIU/L-ACNC: 1.43 MU/L (ref 0.4–4)

## 2022-09-26 PROCEDURE — 90471 IMMUNIZATION ADMIN: CPT | Performed by: INTERNAL MEDICINE

## 2022-09-26 PROCEDURE — 82043 UR ALBUMIN QUANTITATIVE: CPT | Performed by: INTERNAL MEDICINE

## 2022-09-26 PROCEDURE — 84443 ASSAY THYROID STIM HORMONE: CPT | Performed by: INTERNAL MEDICINE

## 2022-09-26 PROCEDURE — 36415 COLL VENOUS BLD VENIPUNCTURE: CPT | Performed by: INTERNAL MEDICINE

## 2022-09-26 PROCEDURE — 80048 BASIC METABOLIC PNL TOTAL CA: CPT | Performed by: INTERNAL MEDICINE

## 2022-09-26 PROCEDURE — 84439 ASSAY OF FREE THYROXINE: CPT | Performed by: INTERNAL MEDICINE

## 2022-09-26 PROCEDURE — 83036 HEMOGLOBIN GLYCOSYLATED A1C: CPT | Performed by: INTERNAL MEDICINE

## 2022-09-26 PROCEDURE — 99215 OFFICE O/P EST HI 40 MIN: CPT | Mod: 25 | Performed by: INTERNAL MEDICINE

## 2022-09-26 PROCEDURE — 90686 IIV4 VACC NO PRSV 0.5 ML IM: CPT | Performed by: INTERNAL MEDICINE

## 2022-09-26 PROCEDURE — 80061 LIPID PANEL: CPT | Performed by: INTERNAL MEDICINE

## 2022-09-26 RX ORDER — ATORVASTATIN CALCIUM 10 MG/1
10 TABLET, FILM COATED ORAL DAILY
Qty: 90 TABLET | Refills: 3 | Status: SHIPPED | OUTPATIENT
Start: 2022-09-26 | End: 2023-10-09

## 2022-09-26 RX ORDER — PROCHLORPERAZINE 25 MG/1
1 SUPPOSITORY RECTAL
Qty: 1 EACH | Refills: 3 | Status: SHIPPED | OUTPATIENT
Start: 2022-09-26 | End: 2023-10-09

## 2022-09-26 RX ORDER — ATORVASTATIN CALCIUM 10 MG/1
TABLET, FILM COATED ORAL
Qty: 90 TABLET | Refills: 0 | Status: SHIPPED | OUTPATIENT
Start: 2022-09-26 | End: 2022-09-26

## 2022-09-26 RX ORDER — LISINOPRIL 5 MG/1
TABLET ORAL
Qty: 90 TABLET | Refills: 0 | Status: SHIPPED | OUTPATIENT
Start: 2022-09-26 | End: 2022-12-12

## 2022-09-26 RX ORDER — PROCHLORPERAZINE 25 MG/1
1 SUPPOSITORY RECTAL SEE ADMIN INSTRUCTIONS
Qty: 9 EACH | Refills: 3 | Status: SHIPPED | OUTPATIENT
Start: 2022-09-26 | End: 2023-10-09

## 2022-09-26 RX ORDER — INSULIN ASPART 100 [IU]/ML
INJECTION, SOLUTION INTRAVENOUS; SUBCUTANEOUS
Qty: 80 ML | Refills: 3 | Status: SHIPPED | OUTPATIENT
Start: 2022-09-26 | End: 2023-10-09

## 2022-09-26 NOTE — TELEPHONE ENCOUNTER
Please contact this patient to schedule an appointment for blood pressure check and cholesterol check within the next 3 months.     I will have lab tests ordered so that fasting lab appointment can be done prior to her office visit.     Thank you   Kristen Kehr PA-C

## 2022-09-26 NOTE — PATIENT INSTRUCTIONS
-Start Ozempic 0.25 mg weekly for 2 weeks then increase to 0.5 mg weekly for 1 month and then increase to 1.0 mg weekly thereafter      [facility]-Department of Endocrinology  Diabetes Educators:   Fatou Arora, RN and Dora Ambrocio RN  Clinic Nurse: TAMIR Castanon  CMA's: Huseyin   Scheduling/Clinic phone number : [phone]   Clinic Fax: [phone]  On-Call Endocrine at the [place] (after hours/weekends): [phone] option 4    Please call the number below to schedule your labs.      Appointment Reminders:  * Please bring meter with for staff to download  * If you are due ONLY for an A1C, it is scheduled with the nurse and will be done in clinic. You do not need to schedule a lab appointment. Fasting is not required for an A1C.  * Refill request should be submitted to your pharmacy. They will contact clinic for approval.

## 2022-09-26 NOTE — LETTER
9/26/2022         RE: Brigitte Robles  [address] [facility] [id number]        Dear Colleague,    Thank you for referring your patient, Brigitte Robles, to the [facility]. Please see a copy of my visit note below.            Endocrinology Note         Brigitte is a 47 year old female who has a visit for type 1 diabetes.    HPI  Ms Brigitte Robles is a 47 years old female who has a visit for type 1 diabetes. Last seen 10/2021    She was noted to have increased blurred vision and fatigue in February 2016 and noted to have blood glucose of 300s and A1C of 7.0%. She was initially diagnosed with type 2 diabetes and was started on metformin 500 mg bid which did not control her blood glucose. She was then seen by her PCP who ran more blood test and noted for low c-peptide and positive MONTSERRAT ab and IA2 antibody. She was then started on insulin since.    Interval history:  She is currently using Tandem and Dexcom and is very pleased with it.  Her main concern today is inability to lose weight. She said that she has gained about 20 lbs in the last year despite multiple trials of weight loss program and exercise. She is unable to see significant weight loss.    1) type 1 diabetes:  A1c is 7.5% (9/26/2022). Reviewed Dexcom and Tandem. She is using Control IQ. Her pattern is postprandial hyperglycemia after lunch and dinner. Her glucose tended to stay high all night (>200). She has not had much of hypoglycemia anymore.     Pump setting  Basal rate  Midnight: 1.1 unit/hr  0300 AM: 1.4 unit/hr  1000 PM: 1.3 unit/hr    ICR  Midnight 1 unit per 8 gram  0300 AM: 1 unit per 7 gram  1000 PM: 1 unit per 8 gram    Correction factor  Midnight: 1 unit per 50 mg/dl  0300 AM: 1 unit per 50 mg/dl  1000 PM: 1 unit per 50 mg/dl    Target  mg/dl    2) hypothyroidism: She has history of hypothyroidism for the past 11 years. It was diagnosed about 6 months postpartum. She is currently on Synthroid  125 mcg daily for 6 days and 1/2 pill for 1 day per week.   Lab on 12/14/2021 showed TSH 2.00, FT4 1.03.    Past Medical History  Type 1 diabetes  Hypothyroidism  Hx of hysterectomy in 2021 due to heavy menses and anemia.    Allergies  Allergies   Allergen Reactions     Levothyroxine      Dizzy to generic --- not to synthroid     Medications  Current Outpatient Medications   Medication Sig Dispense Refill     atorvastatin (LIPITOR) 10 MG tablet TAKE 1 TABLET BY MOUTH EVERY DAY 90 tablet 0     blood glucose monitoring (ONETOUCH VERIO IQ) test strip Use to test blood sugar 10 times daily or as directed. 900 strip 3     citalopram (CELEXA) 20 MG tablet Take 1 tablet (20 mg) by mouth daily 90 tablet 3     Continuous Blood Gluc  (DEXCOM G6 ) MAGGY 1 Device continuous 1 Device 0     Continuous Blood Gluc Sensor (DEXCOM G6 SENSOR) MISC 1 each See Admin Instructions Change sensor every 10 days. 9 each 3     Continuous Blood Gluc Transmit (DEXCOM G6 TRANSMITTER) MISC 1 each every 3 months 1 each 3     glucagon (GLUCAGON EMERGENCY) 1 MG kit Inject 1 mg into the muscle once for 1 dose 1 mg 0     insulin aspart (NOVOLOG FLEXPEN) 100 UNIT/ML pen USE 1 UNIT PER 10 GM CARB WITH BREAKFAST AND LUNCH AND 1 UNIT PER 8 GM CARB W/DINNER -=50UNITS/DAY 45 mL 3     insulin aspart (NOVOLOG VIAL) 100 UNITS/ML vial USE AS DIRECTED VIA INSULIN PUMP. TOTAL DAILY DOSE APPROX 80 UNITS. 70 mL 3     insulin degludec (TRESIBA FLEXTOUCH) 200 UNIT/ML pen INJECT 30 UNITS SUBCUTANEOUS DAILY 15 mL 3     insulin pen needle (B-D U/F) 31G X 5 MM miscellaneous USE 4 DAILY OR AS DIRECTED. 400 each 3     INSULIN PUMP - OUTPATIENT Date Last Updated: 1/25/2021  Tandem  Pump Website Username: [email or web address]  Pump Website Password: [email or web address]       lisinopril (ZESTRIL) 5 MG tablet Take 1 tablet (5 mg) by mouth daily 90 tablet 3     ONETOUCH DELICA LANCETS 33G MISC 1 Box 6 times daily 100 each 12     SYNTHROID 125 MCG tablet TAKE  "1 TABLET FOR 6 DAYS AND TAKE 0.5 TABLET 1 DAY OF THE WEEK 90 tablet 1     Family History  Type 1 diabetes in her father, mother, paternal aunt, paternal uncle, brother and sister  Type 2 diabetes in her grandmother  Heart in her maternal grandfather and maternal grandmother  hypothyroid in her brother, father, maternal grandfather, maternal grandmother, mother, and sister.     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    Weight as of this encounter: 109.3 kg (241 lb).   Constitutional: no distress, comfortable, pleasant   HEENT: no pallor, no jaundice  Thyroid: no thyroid enlargement, no nodule appreciated  CVS: RRR, S1,S2, no murmur  Lungs: CTA B/l  Abd: soft, NT, ND, no hepatomegaly  Skin: no jaundice   Neurological: normal gait, intact sensation per monofilament test bilaterally (exam 9/26/2022), no tremor  Psychological: appropriate mood     RESULTS   Latest Reference Range & Units 09/26/22 12:13   Hemoglobin A1C POCT 4.3 - <5.7 % 7.5 !   !: Data is abnormal  Lab Results   Component Value Date    A1C 7.6 12/14/2021    A1C 7.5 11/04/2021    A1C 7.2 04/26/2021    A1C 7.9 12/15/2020    A1C 7.7 06/23/2020    A1C 7.4 10/07/2019    A1C 7.2 06/10/2019        Ref. Range 2/5/2016 11:41   Glutamic Acid Decarboxylase Antibody Unknown 45.7 (H)   IA-2 Antibody Unknown 3.4 (H)   Islet Cell Antibody IgG Unknown <1:4...      Ref. Range 2/10/2016 08:35   C-Peptide Latest Ref Range: 0.9-6.9 ng/mL 0.7 (L)     ENDO DIABETES Latest Ref Rng & Units 12/14/2021   CHOL " <200 mg/dL 183   LDL <=100 mg/dL 85   HDL >=50 mg/dL 76   NHDL <130 mg/dL 107   VLDL 0 - 30 mg/dL    TRIGLYCERIDES <150 mg/dL 111   CREATININE 0.52 - 1.04 mg/dL 0.92   HCT 35.0 - 47.0 %    HGB 11.7 - 15.7 g/dL    MICROL mg/L 10   UMALCR 0.00 - 25.00 mg/g Cr 4.15       ASSESSMENT:    Ms Brigitte Robles is a 47 years old female who has a visit for follow up type 1 diabetes and hypothyroidism    1) type 1 diabetes: diagnosed type 1 diabetes in Feb 2016. A1c is 7.5%. She usually has postprandial hyperglycemia and elevated BG at night. No more hypoglycemia.     Change basal setting as follows    Plan:  Pump setting  Basal rate  Midnight: 1.4 unit/hr  0300 AM: 1.5 unit/hr  1000 PM: 1.5 unit/hr    ICR  Midnight 1 unit per 8 gram  0300 AM: 1 unit per 7 gram  1000 PM: 1 unit per 8 gram    Correction factor  Midnight: 1 unit per 50 mg/dl  0300 AM: 1 unit per 50 mg/dl  1000 PM: 1 unit per 50 mg/dl    - continue using Dexcom  - will check lab this year    2) Hypothyroidism:clinically euthyroid. Lab was normal in 12/2021 Continue Synthroid 125 mcg x6 days per week and 1/2 pill for 1 day per week.  - will check lab this year    3) Obesity: BMI 34. Encourage to continue on healthy diet and exercise. She is very frustrated that she could not lose weight despite multiple trials of weight loss program and exercise. She could not see significant weight loss result  - discussed weight loss and weight loss medications  - she is interested in trial of GLP-1RA.  - will start her on Ozempic    PLAN:   - change basal rate as above  - continue current dose of Synthroid  -Start Ozempic 0.25 mg weekly for 2 weeks then increase to 0.5 mg weekly for 1 month and then increase to 1.0 mg weekly thereafter  - lab for TSH, FT4, lipid panel, urine microalb, Cr  - RTC 6 months       External notes/medical records independently reviewed, labs and imaging independently reviewed, medical management and tests to be discussed/communicated to  patient.    Time: I spent 40 minutes on the date of the encounter preparing to see patient (including chart review and preparation), obtaining and or reviewing additional medical history, performing a physical exam and evaluation, documenting clinical information in the electronic health record, independently interpreting results, communicating results to the patient and coordinating care.    Lloyd Ledesma MD  Endocrinology  [phone]

## 2022-09-26 NOTE — NURSING NOTE
Brigitte Robles's goals for this visit include:   Chief Complaint   Patient presents with     RECHECK     Follow up diabetes and thyroid     She requests these members of her care team be copied on today's visit information: Yes    PCP: Kehr, Kristen M    Referring Provider:  No referring provider defined for this encounter.    BP (!) 141/82 (BP Location: Right arm, Patient Position: Sitting, Cuff Size: Adult Large)   Pulse 64   Wt 109.3 kg (241 lb)   LMP 09/22/2021 (Approximate)   SpO2 97%   BMI 38.90 kg/m      Do you need any medication refills at today's visit? Yes    Lucero Lopez [facility]  Adult Endocrinology  [facility]

## 2022-09-26 NOTE — PROGRESS NOTES
Endocrinology Note         Brigitte is a 47 year old female who has a visit for type 1 diabetes.    HPI  Ms Brigitte Robles is a 47 years old female who has a visit for type 1 diabetes. Last seen 10/2021    She was noted to have increased blurred vision and fatigue in February 2016 and noted to have blood glucose of 300s and A1C of 7.0%. She was initially diagnosed with type 2 diabetes and was started on metformin 500 mg bid which did not control her blood glucose. She was then seen by her PCP who ran more blood test and noted for low c-peptide and positive MONTSERRAT ab and IA2 antibody. She was then started on insulin since.    Interval history:  She is currently using Tandem and Dexcom and is very pleased with it.  Her main concern today is inability to lose weight. She said that she has gained about 20 lbs in the last year despite multiple trials of weight loss program and exercise. She is unable to see significant weight loss.    1) type 1 diabetes:  A1c is 7.5% (9/26/2022). Reviewed Dexcom and Tandem. She is using Control IQ. Her pattern is postprandial hyperglycemia after lunch and dinner. Her glucose tended to stay high all night (>200). She has not had much of hypoglycemia anymore.     Pump setting  Basal rate  Midnight: 1.1 unit/hr  0300 AM: 1.4 unit/hr  1000 PM: 1.3 unit/hr    ICR  Midnight 1 unit per 8 gram  0300 AM: 1 unit per 7 gram  1000 PM: 1 unit per 8 gram    Correction factor  Midnight: 1 unit per 50 mg/dl  0300 AM: 1 unit per 50 mg/dl  1000 PM: 1 unit per 50 mg/dl    Target  mg/dl    2) hypothyroidism: She has history of hypothyroidism for the past 11 years. It was diagnosed about 6 months postpartum. She is currently on Synthroid 125 mcg daily for 6 days and 1/2 pill for 1 day per week.   Lab on 12/14/2021 showed TSH 2.00, FT4 1.03.    Past Medical History  Type 1 diabetes  Hypothyroidism  Hx of hysterectomy in 2021 due to heavy menses and anemia.    Allergies  Allergies   Allergen Reactions      Levothyroxine      Dizzy to generic --- not to synthroid     Medications  Current Outpatient Medications   Medication Sig Dispense Refill     atorvastatin (LIPITOR) 10 MG tablet TAKE 1 TABLET BY MOUTH EVERY DAY 90 tablet 0     blood glucose monitoring (ONETOUCH VERIO IQ) test strip Use to test blood sugar 10 times daily or as directed. 900 strip 3     citalopram (CELEXA) 20 MG tablet Take 1 tablet (20 mg) by mouth daily 90 tablet 3     Continuous Blood Gluc  (DEXCOM G6 ) MAGGY 1 Device continuous 1 Device 0     Continuous Blood Gluc Sensor (DEXCOM G6 SENSOR) MISC 1 each See Admin Instructions Change sensor every 10 days. 9 each 3     Continuous Blood Gluc Transmit (DEXCOM G6 TRANSMITTER) MISC 1 each every 3 months 1 each 3     glucagon (GLUCAGON EMERGENCY) 1 MG kit Inject 1 mg into the muscle once for 1 dose 1 mg 0     insulin aspart (NOVOLOG FLEXPEN) 100 UNIT/ML pen USE 1 UNIT PER 10 GM CARB WITH BREAKFAST AND LUNCH AND 1 UNIT PER 8 GM CARB W/DINNER -=50UNITS/DAY 45 mL 3     insulin aspart (NOVOLOG VIAL) 100 UNITS/ML vial USE AS DIRECTED VIA INSULIN PUMP. TOTAL DAILY DOSE APPROX 80 UNITS. 70 mL 3     insulin degludec (TRESIBA FLEXTOUCH) 200 UNIT/ML pen INJECT 30 UNITS SUBCUTANEOUS DAILY 15 mL 3     insulin pen needle (B-D U/F) 31G X 5 MM miscellaneous USE 4 DAILY OR AS DIRECTED. 400 each 3     INSULIN PUMP - OUTPATIENT Date Last Updated: 1/25/2021  Tandem  Pump Website Username: [email or web address]  Pump Website Password: [email or web address]       lisinopril (ZESTRIL) 5 MG tablet Take 1 tablet (5 mg) by mouth daily 90 tablet 3     ONETOUCH DELICA LANCETS 33G MISC 1 Box 6 times daily 100 each 12     SYNTHROID 125 MCG tablet TAKE 1 TABLET FOR 6 DAYS AND TAKE 0.5 TABLET 1 DAY OF THE WEEK 90 tablet 1     Family History  Type 1 diabetes in her father, mother, paternal aunt, paternal uncle, brother and sister  Type 2 diabetes in her grandmother  Heart in her maternal grandfather and maternal  "grandmother  hypothyroid in her brother, father, maternal grandfather, maternal grandmother, mother, and sister.     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    Weight as of this encounter: 109.3 kg (241 lb).   Constitutional: no distress, comfortable, pleasant   HEENT: no pallor, no jaundice  Thyroid: no thyroid enlargement, no nodule appreciated  CVS: RRR, S1,S2, no murmur  Lungs: CTA B/l  Abd: soft, NT, ND, no hepatomegaly  Skin: no jaundice   Neurological: normal gait, intact sensation per monofilament test bilaterally (exam 9/26/2022), no tremor  Psychological: appropriate mood     RESULTS   Latest Reference Range & Units 09/26/22 12:13   Hemoglobin A1C POCT 4.3 - <5.7 % 7.5 !   !: Data is abnormal  Lab Results   Component Value Date    A1C 7.6 12/14/2021    A1C 7.5 11/04/2021    A1C 7.2 04/26/2021    A1C 7.9 12/15/2020    A1C 7.7 06/23/2020    A1C 7.4 10/07/2019    A1C 7.2 06/10/2019        Ref. Range 2/5/2016 11:41   Glutamic Acid Decarboxylase Antibody Unknown 45.7 (H)   IA-2 Antibody Unknown 3.4 (H)   Islet Cell Antibody IgG Unknown <1:4...      Ref. Range 2/10/2016 08:35   C-Peptide Latest Ref Range: 0.9-6.9 ng/mL 0.7 (L)     ENDO DIABETES Latest Ref Rng & Units 12/14/2021   CHOL <200 mg/dL 183   LDL <=100 mg/dL 85   HDL >=50 mg/dL 76   NHDL <130 mg/dL 107   VLDL 0 - 30 mg/dL    TRIGLYCERIDES <150 mg/dL 111   CREATININE 0.52 - 1.04 mg/dL 0.92   HCT 35.0 - 47.0 %    HGB 11.7 - 15.7 g/dL    MICROL mg/L 10   UMALCR 0.00 - 25.00 mg/g Cr 4.15 "       ASSESSMENT:    Ms Brigitte Robles is a 47 years old female who has a visit for follow up type 1 diabetes and hypothyroidism    1) type 1 diabetes: diagnosed type 1 diabetes in Feb 2016. A1c is 7.5%. She usually has postprandial hyperglycemia and elevated BG at night. No more hypoglycemia.     Change basal setting as follows    Plan:  Pump setting  Basal rate  Midnight: 1.4 unit/hr  0300 AM: 1.5 unit/hr  1000 PM: 1.5 unit/hr    ICR  Midnight 1 unit per 8 gram  0300 AM: 1 unit per 7 gram  1000 PM: 1 unit per 8 gram    Correction factor  Midnight: 1 unit per 50 mg/dl  0300 AM: 1 unit per 50 mg/dl  1000 PM: 1 unit per 50 mg/dl    - continue using Dexcom  - will check lab this year    2) Hypothyroidism:clinically euthyroid. Lab was normal in 12/2021 Continue Synthroid 125 mcg x6 days per week and 1/2 pill for 1 day per week.  - will check lab this year    3) Obesity: BMI 34. Encourage to continue on healthy diet and exercise. She is very frustrated that she could not lose weight despite multiple trials of weight loss program and exercise. She could not see significant weight loss result  - discussed weight loss and weight loss medications  - she is interested in trial of GLP-1RA.  - will start her on Ozempic    PLAN:   - change basal rate as above  - continue current dose of Synthroid  -Start Ozempic 0.25 mg weekly for 2 weeks then increase to 0.5 mg weekly for 1 month and then increase to 1.0 mg weekly thereafter  - lab for TSH, FT4, lipid panel, urine microalb, Cr  - RTC 6 months       External notes/medical records independently reviewed, labs and imaging independently reviewed, medical management and tests to be discussed/communicated to patient.    Time: I spent 40 minutes on the date of the encounter preparing to see patient (including chart review and preparation), obtaining and or reviewing additional medical history, performing a physical exam and evaluation, documenting clinical information in the  electronic health record, independently interpreting results, communicating results to the patient and coordinating care.    Lloyd Ledesma MD  Endocrinology  [phone]

## 2022-09-27 LAB
CREAT UR-MCNC: 21 MG/DL
MICROALBUMIN UR-MCNC: <5 MG/L
MICROALBUMIN/CREAT UR: NORMAL MG/G{CREAT}

## 2022-09-28 RX ORDER — LEVOTHYROXINE SODIUM 125 MCG
TABLET ORAL
Qty: 85 TABLET | Refills: 0 | Status: SHIPPED | OUTPATIENT
Start: 2022-09-28 | End: 2022-12-13

## 2022-09-28 NOTE — TELEPHONE ENCOUNTER
Last Clinic Visit: Endocrinology, Diabetes, and Metabolism  10/25/2021  [facility] [place]  Recommended 6 month follow up  [place] 4/3/23

## 2022-10-04 ENCOUNTER — ANCILLARY PROCEDURE (OUTPATIENT)
Dept: MAMMOGRAPHY | Facility: CLINIC | Age: 47
End: 2022-10-04
Attending: PHYSICIAN ASSISTANT
Payer: COMMERCIAL

## 2022-10-04 DIAGNOSIS — Z12.31 SCREENING MAMMOGRAM, ENCOUNTER FOR: ICD-10-CM

## 2022-10-04 PROCEDURE — 77067 SCR MAMMO BI INCL CAD: CPT | Mod: GC | Performed by: STUDENT IN AN ORGANIZED HEALTH CARE EDUCATION/TRAINING PROGRAM

## 2022-10-04 PROCEDURE — 77063 BREAST TOMOSYNTHESIS BI: CPT | Mod: GC | Performed by: STUDENT IN AN ORGANIZED HEALTH CARE EDUCATION/TRAINING PROGRAM

## 2022-10-14 DIAGNOSIS — F32.0 MILD MAJOR DEPRESSION (H): ICD-10-CM

## 2022-10-14 DIAGNOSIS — F41.1 GAD (GENERALIZED ANXIETY DISORDER): ICD-10-CM

## 2022-10-14 NOTE — LETTER
October 18, 2022    Brigitte Robles  [address] [place] [id number]    Dear Brigitte,       We recently received a refill request for citalopram (CELEXA) 20 MG tablet.  We have refilled this for a one time 90 day supply only because you are due for a:    Medication check office visit with in the next 90 days.      Please call at your earliest convenience so that there will not be a delay with your future refills.          Thank you,   Your [facility] Team/  [phone]

## 2022-10-17 RX ORDER — CITALOPRAM HYDROBROMIDE 20 MG/1
TABLET ORAL
Qty: 90 TABLET | Refills: 0 | Status: SHIPPED | OUTPATIENT
Start: 2022-10-17 | End: 2023-01-17

## 2022-10-17 NOTE — TELEPHONE ENCOUNTER
Please contact patient and schedule for medication check within the next 90 days.   Thank you.   Kristen Kehr PA-C

## 2022-11-20 ENCOUNTER — HEALTH MAINTENANCE LETTER (OUTPATIENT)
Age: 47
End: 2022-11-20

## 2022-12-06 DIAGNOSIS — E66.01 CLASS 2 SEVERE OBESITY WITH SERIOUS COMORBIDITY AND BODY MASS INDEX (BMI) OF 36.0 TO 36.9 IN ADULT, UNSPECIFIED OBESITY TYPE (H): ICD-10-CM

## 2022-12-06 DIAGNOSIS — E66.812 CLASS 2 SEVERE OBESITY WITH SERIOUS COMORBIDITY AND BODY MASS INDEX (BMI) OF 36.0 TO 36.9 IN ADULT, UNSPECIFIED OBESITY TYPE (H): ICD-10-CM

## 2022-12-08 RX ORDER — SEMAGLUTIDE 1.34 MG/ML
INJECTION, SOLUTION SUBCUTANEOUS
Refills: 1 | OUTPATIENT
Start: 2022-12-08

## 2022-12-08 NOTE — TELEPHONE ENCOUNTER
Patient should be taking 1mg dose now. Plan from September visit shown below:   -Start Ozempic 0.25 mg weekly for 2 weeks then increase to 0.5 mg weekly for 1 month and then increase to 1.0 mg weekly thereafter

## 2022-12-08 NOTE — TELEPHONE ENCOUNTER
OZEMPIC 0.25-0.5 MG/DOSE PEN  Last Written Prescription Date:   9/26/2022  Last Fill Quantity: 3,   # refills: 0  Last Office Visit :  9/26/2022  Future Office visit:   4/3/2023    Routing refill request to provider for review/approval because:  Please send new updated order with updated dose for Pt care.  Pt has a visit scheduled for 4/3/2023.       Anastasiya Barksdale RN  Central Triage Red Flags/Med Refills

## 2022-12-09 NOTE — TELEPHONE ENCOUNTER
Risa Brown  You 20 hours ago (2:43 PM)     TH  Please refuse and close. This dose is complete.

## 2022-12-11 DIAGNOSIS — E06.3 HYPOTHYROIDISM DUE TO HASHIMOTO'S THYROIDITIS: ICD-10-CM

## 2022-12-11 DIAGNOSIS — E10.9 TYPE 1 DIABETES MELLITUS WITHOUT COMPLICATION (H): ICD-10-CM

## 2022-12-12 RX ORDER — LISINOPRIL 5 MG/1
TABLET ORAL
Qty: 90 TABLET | Refills: 0 | Status: SHIPPED | OUTPATIENT
Start: 2022-12-12 | End: 2023-01-23

## 2022-12-13 RX ORDER — LEVOTHYROXINE SODIUM 125 MCG
TABLET ORAL
Qty: 90 TABLET | Refills: 3 | Status: SHIPPED | OUTPATIENT
Start: 2022-12-13 | End: 2023-04-04

## 2023-01-17 DIAGNOSIS — F41.1 GAD (GENERALIZED ANXIETY DISORDER): ICD-10-CM

## 2023-01-17 DIAGNOSIS — F32.0 MILD MAJOR DEPRESSION (H): ICD-10-CM

## 2023-01-17 RX ORDER — CITALOPRAM HYDROBROMIDE 20 MG/1
TABLET ORAL
Qty: 90 TABLET | Refills: 0 | Status: SHIPPED | OUTPATIENT
Start: 2023-01-17 | End: 2023-01-23

## 2023-01-22 ASSESSMENT — ENCOUNTER SYMPTOMS
EYE PAIN: 0
SORE THROAT: 0
ABDOMINAL PAIN: 0
FREQUENCY: 0
HEMATOCHEZIA: 0
HEADACHES: 0
HEMATURIA: 0
NERVOUS/ANXIOUS: 0
SHORTNESS OF BREATH: 0
PALPITATIONS: 0
JOINT SWELLING: 0
CHILLS: 0
DYSURIA: 0
HEARTBURN: 0
ARTHRALGIAS: 0
PARESTHESIAS: 0
WEAKNESS: 0
BREAST MASS: 0
DIZZINESS: 0
MYALGIAS: 0
FEVER: 0
CONSTIPATION: 0
COUGH: 0
DIARRHEA: 0
NAUSEA: 0

## 2023-01-23 ENCOUNTER — OFFICE VISIT (OUTPATIENT)
Dept: FAMILY MEDICINE | Facility: CLINIC | Age: 48
End: 2023-01-23
Payer: COMMERCIAL

## 2023-01-23 VITALS
WEIGHT: 231 LBS | RESPIRATION RATE: 20 BRPM | SYSTOLIC BLOOD PRESSURE: 124 MMHG | BODY MASS INDEX: 37.12 KG/M2 | DIASTOLIC BLOOD PRESSURE: 87 MMHG | OXYGEN SATURATION: 96 % | TEMPERATURE: 97.1 F | HEIGHT: 66 IN | HEART RATE: 78 BPM

## 2023-01-23 DIAGNOSIS — F41.1 GAD (GENERALIZED ANXIETY DISORDER): ICD-10-CM

## 2023-01-23 DIAGNOSIS — E10.9 TYPE 1 DIABETES MELLITUS WITHOUT COMPLICATION (H): ICD-10-CM

## 2023-01-23 DIAGNOSIS — F32.0 MILD MAJOR DEPRESSION (H): ICD-10-CM

## 2023-01-23 DIAGNOSIS — Z00.00 ROUTINE GENERAL MEDICAL EXAMINATION AT A HEALTH CARE FACILITY: Primary | ICD-10-CM

## 2023-01-23 PROCEDURE — 99396 PREV VISIT EST AGE 40-64: CPT | Performed by: PHYSICIAN ASSISTANT

## 2023-01-23 PROCEDURE — 99213 OFFICE O/P EST LOW 20 MIN: CPT | Mod: 25 | Performed by: PHYSICIAN ASSISTANT

## 2023-01-23 RX ORDER — LISINOPRIL 5 MG/1
5 TABLET ORAL DAILY
Qty: 90 TABLET | Refills: 3 | Status: SHIPPED | OUTPATIENT
Start: 2023-01-23 | End: 2024-03-18

## 2023-01-23 RX ORDER — CITALOPRAM HYDROBROMIDE 20 MG/1
20 TABLET ORAL DAILY
Qty: 90 TABLET | Refills: 3 | Status: SHIPPED | OUTPATIENT
Start: 2023-01-23 | End: 2024-03-18

## 2023-01-23 ASSESSMENT — ENCOUNTER SYMPTOMS
PALPITATIONS: 0
NERVOUS/ANXIOUS: 0
HEADACHES: 0
SORE THROAT: 0
SHORTNESS OF BREATH: 0
WEAKNESS: 0
HEMATOCHEZIA: 0
MYALGIAS: 0
EYE PAIN: 0
PARESTHESIAS: 0
BREAST MASS: 0
COUGH: 0
FREQUENCY: 0
JOINT SWELLING: 0
HEMATURIA: 0
HEARTBURN: 0
NAUSEA: 0
ABDOMINAL PAIN: 0
ARTHRALGIAS: 0
FEVER: 0
CHILLS: 0
DYSURIA: 0
CONSTIPATION: 0
DIZZINESS: 0
DIARRHEA: 0

## 2023-01-23 ASSESSMENT — PATIENT HEALTH QUESTIONNAIRE - PHQ9
SUM OF ALL RESPONSES TO PHQ QUESTIONS 1-9: 0
10. IF YOU CHECKED OFF ANY PROBLEMS, HOW DIFFICULT HAVE THESE PROBLEMS MADE IT FOR YOU TO DO YOUR WORK, TAKE CARE OF THINGS AT HOME, OR GET ALONG WITH OTHER PEOPLE: NOT DIFFICULT AT ALL
SUM OF ALL RESPONSES TO PHQ QUESTIONS 1-9: 0

## 2023-01-23 ASSESSMENT — PAIN SCALES - GENERAL: PAINLEVEL: NO PAIN (0)

## 2023-01-23 NOTE — NURSING NOTE
"Chief Complaint   Patient presents with     Physical       Initial /87   Pulse 78   Temp 97.1  F (36.2  C) (Tympanic)   Resp 20   Ht 1.664 m (5' 5.5\")   Wt 104.8 kg (231 lb)   LMP 09/22/2021 (Approximate)   SpO2 96%   BMI 37.86 kg/m   Estimated body mass index is 37.86 kg/m  as calculated from the following:    Height as of this encounter: 1.664 m (5' 5.5\").    Weight as of this encounter: 104.8 kg (231 lb).  Medication Reconciliation: complete    AUDRA Vera MA    "

## 2023-01-23 NOTE — PROGRESS NOTES
SUBJECTIVE:   CC: Brigitte is an 47 year old who presents for preventive health visit.   Patient has been advised of split billing requirements and indicates understanding: Yes  Healthy Habits:     Getting at least 3 servings of Calcium per day:  Yes    Bi-annual eye exam:  Yes    Dental care twice a year:  Yes    Sleep apnea or symptoms of sleep apnea:  None    Diet:  Diabetic and Carbohydrate counting    Frequency of exercise:  2-3 days/week    Duration of exercise:  30-45 minutes    Taking medications regularly:  Yes    Medication side effects:  None    PHQ-2 Total Score: 0    Additional concerns today:  No        Today's PHQ-2 Score:   PHQ-2 ( 1999 Pfizer) 1/22/2023   Q1: Little interest or pleasure in doing things 0   Q2: Feeling down, depressed or hopeless 0   PHQ-2 Score 0   PHQ-2 Total Score (12-17 Years)- Positive if 3 or more points; Administer PHQ-A if positive -   Q1: Little interest or pleasure in doing things Not at all   Q2: Feeling down, depressed or hopeless Not at all   PHQ-2 Score 0           Social History     Tobacco Use     Smoking status: Never     Smokeless tobacco: Never   Substance Use Topics     Alcohol use: Yes     Comment: rare     If you drink alcohol do you typically have >3 drinks per day or >7 drinks per week? No    Alcohol Use 1/22/2023   Prescreen: >3 drinks/day or >7 drinks/week? No   Prescreen: >3 drinks/day or >7 drinks/week? -   No flowsheet data found.    Reviewed orders with patient.  Reviewed health maintenance and updated orders accordingly - Yes  BP Readings from Last 3 Encounters:   01/23/23 124/87   09/26/22 (!) 141/82   12/14/21 (!) 145/84    Wt Readings from Last 3 Encounters:   01/23/23 104.8 kg (231 lb)   09/26/22 109.3 kg (241 lb)   12/14/21 105.4 kg (232 lb 6.4 oz)                  Patient Active Problem List   Diagnosis     Depressive disorder, not elsewhere classified     Hypothyroidism     GLUCOCORTICOID DEFICIENT     Anxiety     CARDIOVASCULAR SCREENING; LDL  GOAL LESS THAN 160     Seasonal allergic rhinitis     Type 1 diabetes mellitus without complication (H)     Morbid obesity (H)     MONTSERRAT (generalized anxiety disorder)     Mild major depression (H)     Metrorrhagia     Past Surgical History:   Procedure Laterality Date     ABDOMEN SURGERY  11.12.2021    Hysterectomy     HC REMOVAL OF TONSILS,12+ Y/O         Social History     Tobacco Use     Smoking status: Never     Smokeless tobacco: Never   Substance Use Topics     Alcohol use: Yes     Comment: rare     Family History   Problem Relation Age of Onset     Diabetes Mother      Hypertension Mother      Thyroid Disease Mother      Diabetes Father         1     Thyroid Disease Father         BORIS'S     Diabetes Sister      Thyroid Disease Sister      Heart Disease Maternal Grandmother         TRIPLE BYPASS     Thyroid Disease Maternal Grandmother      Diabetes Maternal Grandmother         2     Heart Disease Maternal Grandfather         TRIPLE BYPASS     Coronary Artery Disease Maternal Grandfather      Thyroid Disease Maternal Grandfather      Thyroid Disease Brother      Diabetes Paternal Aunt      Diabetes Paternal Uncle      Diabetes Brother         1     Thyroid Disease Brother      Diabetes Nephew         1     Depression Sister      Thyroid Disease Sister      Diabetes Sister         1     Breast Cancer No family hx of      Cancer - colorectal No family hx of          Current Outpatient Medications   Medication Sig Dispense Refill     atorvastatin (LIPITOR) 10 MG tablet Take 1 tablet (10 mg) by mouth daily 90 tablet 3     blood glucose monitoring (ONETOUCH VERIO IQ) test strip Use to test blood sugar 10 times daily or as directed. 900 strip 3     citalopram (CELEXA) 20 MG tablet Take 1 tablet (20 mg) by mouth daily 90 tablet 3     Continuous Blood Gluc  (DEXCOM G6 ) MAGGY 1 Device continuous 1 Device 0     Continuous Blood Gluc Sensor (DEXCOM G6 SENSOR) MISC 1 each See Admin Instructions Change  sensor every 10 days. 9 each 3     Continuous Blood Gluc Transmit (DEXCOM G6 TRANSMITTER) MISC 1 each every 3 months 1 each 3     insulin aspart (NOVOLOG VIAL) 100 UNITS/ML vial USE AS DIRECTED VIA INSULIN PUMP. TOTAL DAILY DOSE APPROX 80 UNITS 80 mL 3     insulin degludec (TRESIBA FLEXTOUCH) 200 UNIT/ML pen INJECT 30 UNITS SUBCUTANEOUS DAILY 15 mL 3     insulin pen needle (B-D U/F) 31G X 5 MM miscellaneous USE 4 DAILY OR AS DIRECTED. 400 each 3     INSULIN PUMP - OUTPATIENT Date Last Updated: 1/25/2021  Tandem  Pump Website Username: [email or web address]  Pump Website Password: [email or web address]       lisinopril (ZESTRIL) 5 MG tablet Take 1 tablet (5 mg) by mouth daily 90 tablet 3     ONETOUCH DELICA LANCETS 33G MISC 1 Box 6 times daily 100 each 12     Semaglutide, 1 MG/DOSE, 4 MG/3ML SOPN Inject 1 mg Subcutaneous once a week To be started after finishing 0.5 mg weekly 9 mL 3     SYNTHROID 125 MCG tablet TAKE 1 TABLET FOR 6 DAYS AND TAKE 0.5 TABLET 1 DAY OF THE WEEK 90 tablet 3     glucagon (GLUCAGON EMERGENCY) 1 MG kit Inject 1 mg into the muscle once for 1 dose 1 mg 0     semaglutide (OZEMPIC) 2 MG/1.5ML SOPN pen Start Ozempic 0.25 mg weekly for 2 weeks then increase to 0.5 mg weekly for 1 month and then increase to 1.0 mg weekly thereafter 3 mL 0     Allergies   Allergen Reactions     Levothyroxine      Dizzy to generic --- not to synthroid     Recent Labs   Lab Test 09/26/22  1259 12/14/21  0917 11/04/21  0915 04/26/21  0000 12/15/20  1624 10/07/19  0000 07/11/19  1743 06/10/19  0000 05/08/19  1029 02/10/16  0835 02/05/16  1141   A1C  --  7.6* 7.5* 7.2* 7.9*   < >  --    < >  --    < > 7.0*    85  --   --  85   < >  --   --   --    < >  --    HDL 63 76  --   --  73   < >  --   --   --    < >  --    TRIG 178* 111  --   --  85   < >  --   --   --    < >  --    ALT  --   --   --   --   --   --   --   --  23  --  20   CR 0.74 0.92  --   --  0.66  --  0.69  --  0.72   < > 0.57   GFRESTIMATED >90 75   --   --  >90  --  >90  --  >90   < > >90  Non  GFR Calc     GFRESTBLACK  --   --   --   --  >90  --  >90  --  >90   < > >90  African American GFR Calc     POTASSIUM 3.8  --   --   --   --   --  3.5  --  4.2   < > 3.8   TSH 1.43 2.00  --   --  2.07   < > 1.78   < > 0.07*   < >  --     < > = values in this interval not displayed.        Breast Cancer Screening:    Breast CA Risk Assessment (FHS-7) 2021   Do you have a family history of breast, colon, or ovarian cancer? No / Unknown No / Unknown No / Unknown         Mammogram Screening: Recommended annual mammography  Pertinent mammograms are reviewed under the imaging tab.    History of abnormal Pap smear: Status post benign hysterectomy. Health Maintenance and Surgical History updated.  PAP / HPV Latest Ref Rng & Units 2021 2017 3/6/2013   PAP   Negative for Intraepithelial Lesion or Malignancy (NILM) - -   PAP (Historical) - - NIL NIL   HPV16 Negative Negative Negative -   HPV18 Negative Negative Negative -   HRHPV Negative Negative Negative -     Reviewed and updated as needed this visit by clinical staff   Tobacco  Allergies  Meds              Reviewed and updated as needed this visit by Provider                 Past Medical History:   Diagnosis Date     Anxiety      DEPRESSIVE DISORDER NEC 2003     Type 1 diabetes mellitus without complication (H) 2016     Unspecified hypothyroidism 2003      Past Surgical History:   Procedure Laterality Date     ABDOMEN SURGERY  2021    Hysterectomy     HC REMOVAL OF TONSILS,12+ Y/O       OB History    Para Term  AB Living   2 2 2 0 0 2   SAB IAB Ectopic Multiple Live Births   0 0 0 0 2      # Outcome Date GA Lbr Nabeel/2nd Weight Sex Delivery Anes PTL Lv   2 Term         SHARRON   1 Term         SHARRON       Review of Systems   Constitutional: Negative for chills and fever.   HENT: Negative for congestion, ear pain, hearing loss and sore throat.   "  Eyes: Negative for pain and visual disturbance.   Respiratory: Negative for cough and shortness of breath.    Cardiovascular: Negative for chest pain, palpitations and peripheral edema.   Gastrointestinal: Negative for abdominal pain, constipation, diarrhea, heartburn, hematochezia and nausea.   Breasts:  Negative for tenderness, breast mass and discharge.   Genitourinary: Negative for dysuria, frequency, genital sores, hematuria, pelvic pain, urgency, vaginal bleeding and vaginal discharge.   Musculoskeletal: Negative for arthralgias, joint swelling and myalgias.   Skin: Negative for rash.   Neurological: Negative for dizziness, weakness, headaches and paresthesias.   Psychiatric/Behavioral: Negative for mood changes. The patient is not nervous/anxious.           OBJECTIVE:   /87   Pulse 78   Temp 97.1  F (36.2  C) (Tympanic)   Resp 20   Ht 1.664 m (5' 5.5\")   Wt 104.8 kg (231 lb)   LMP 09/22/2021 (Approximate)   SpO2 96%   BMI 37.86 kg/m    Physical Exam  GENERAL APPEARANCE: healthy, alert and no distress  EYES: Eyes grossly normal to inspection, PERRL and conjunctivae and sclerae normal  HENT: ear canals and TM's normal, nose and mouth without ulcers or lesions, oropharynx clear and oral mucous membranes moist  NECK: no adenopathy, no asymmetry, masses, or scars and thyroid normal to palpation  RESP: lungs clear to auscultation - no rales, rhonchi or wheezes  BREAST: normal without masses, tenderness or nipple discharge and no palpable axillary masses or adenopathy  CV: regular rate and rhythm, normal S1 S2, no S3 or S4, no murmur, click or rub, no peripheral edema and peripheral pulses strong  ABDOMEN: soft, nontender, no hepatosplenomegaly, no masses and bowel sounds normal  MS: no musculoskeletal defects are noted and gait is age appropriate without ataxia  SKIN: no suspicious lesions or rashes  NEURO: Normal strength and tone, sensory exam grossly normal, mentation intact and speech " normal  PSYCH: mentation appears normal and affect normal/bright    Diagnostic Test Results:  Labs reviewed in Epic    ASSESSMENT/PLAN:   (Z00.00) Routine general medical examination at a health care facility  (primary encounter diagnosis)  Comment: Health maintenance reviewed and updated.  She plans to check insurance coverage for colon screening.   Discussed cologuard vs colonoscopy.    (F41.1) MONTSERRAT (generalized anxiety disorder)  Comment: stable, refills given   Plan: citalopram (CELEXA) 20 MG tablet            (F32.0) Mild major depression (H)  Comment: stable, managed with citalopram  Plan: citalopram (CELEXA) 20 MG tablet            (E10.9) Type 1 diabetes mellitus without complication (H)  Comment: managed by Endocrinology  Plan: lisinopril (ZESTRIL) 5 MG tablet              Patient has been advised of split billing requirements and indicates understanding: Yes      COUNSELING:  Reviewed preventive health counseling, as reflected in patient instructions       Regular exercise       Healthy diet/nutrition        She reports that she has never smoked. She has never used smokeless tobacco.      Kristen M. Kehr, PA-C  [facility]

## 2023-01-23 NOTE — PATIENT INSTRUCTIONS
SCHEDULE EYE EXAM    CHECK ON COLOGUARD AND COLONOSCOPY COVERAGE    RECHECK IN 1 YEAR              Preventive Health Recommendations  Female Ages 40 to 49    Yearly exam:   See your health care provider every year in order to  Review health changes.   Discuss preventive care.    Review your medicines if your doctor prescribed any.    Get a Pap test every three years (unless you have an abnormal result and your provider advises testing more often).    If you get Pap tests with HPV test, you only need to test every 5 years, unless you have an abnormal result. You do not need a Pap test if your uterus was removed (hysterectomy) and you have not had cancer.    You should be tested each year for STDs (sexually transmitted diseases), if you're at risk.   Ask your doctor if you should have a mammogram.    Have a colonoscopy (test for colon cancer) if someone in your family has had colon cancer or polyps before age 50.     Have a cholesterol test every 5 years.     Have a diabetes test (fasting glucose) after age 45. If you are at risk for diabetes, you should have this test every 3 years.    Shots: Get a flu shot each year. Get a tetanus shot every 10 years.     Nutrition:   Eat at least 5 servings of fruits and vegetables each day.  Eat whole-grain bread, whole-wheat pasta and brown rice instead of white grains and rice.  Get adequate Calcium and Vitamin D.      Lifestyle  Exercise at least 150 minutes a week (an average of 30 minutes a day, 5 days a week). This will help you control your weight and prevent disease.  Limit alcohol to one drink per day.  No smoking.   Wear sunscreen to prevent skin cancer.  See your dentist every six months for an exam and cleaning.

## 2023-03-30 NOTE — PROGRESS NOTES
Outcome for 03/30/23 4:29 PM: Device upload instructions sent to patient via [employer] to upload to [employer].  Marielena Matthew [facility]  Adult Endocrinology  [facility], [place]

## 2023-04-03 ENCOUNTER — OFFICE VISIT (OUTPATIENT)
Dept: ENDOCRINOLOGY | Facility: CLINIC | Age: 48
End: 2023-04-03
Payer: COMMERCIAL

## 2023-04-03 VITALS
HEART RATE: 75 BPM | OXYGEN SATURATION: 96 % | DIASTOLIC BLOOD PRESSURE: 79 MMHG | WEIGHT: 220.6 LBS | SYSTOLIC BLOOD PRESSURE: 112 MMHG | BODY MASS INDEX: 36.15 KG/M2

## 2023-04-03 DIAGNOSIS — E10.9 TYPE 1 DIABETES MELLITUS WITHOUT COMPLICATION (H): Primary | ICD-10-CM

## 2023-04-03 DIAGNOSIS — E06.3 HYPOTHYROIDISM DUE TO HASHIMOTO'S THYROIDITIS: ICD-10-CM

## 2023-04-03 LAB
HBA1C MFR BLD: 6.8 % (ref 4.3–?)
T4 FREE SERPL-MCNC: 1.3 NG/DL (ref 0.76–1.46)
TSH SERPL DL<=0.005 MIU/L-ACNC: 0.09 MU/L (ref 0.4–4)

## 2023-04-03 PROCEDURE — 83036 HEMOGLOBIN GLYCOSYLATED A1C: CPT | Performed by: INTERNAL MEDICINE

## 2023-04-03 PROCEDURE — 84443 ASSAY THYROID STIM HORMONE: CPT | Performed by: INTERNAL MEDICINE

## 2023-04-03 PROCEDURE — 36415 COLL VENOUS BLD VENIPUNCTURE: CPT | Performed by: INTERNAL MEDICINE

## 2023-04-03 PROCEDURE — 99213 OFFICE O/P EST LOW 20 MIN: CPT | Performed by: INTERNAL MEDICINE

## 2023-04-03 PROCEDURE — 84439 ASSAY OF FREE THYROXINE: CPT | Performed by: INTERNAL MEDICINE

## 2023-04-03 NOTE — PATIENT INSTRUCTIONS
[facility]-Department of Endocrinology  Diabetes Educators:   Cora Amos, RN and Dora Ambrocio RN  Clinic Nurse: TAIMR Castanon  CMA's: Huseyin DERASN: Paulina  Scheduling/Clinic phone number : [phone]   Clinic Fax: [phone]  On-Call Endocrine at the [place] (after hours/weekends): [phone] option 4    Please call the number below to schedule your labs.    [facility] [phone]   [facility] [phone]   [place] [phone]   [facility]  [phone]   [facility] [phone]   [place] [phone]   [facility] [phone]   [facility] Walk-In Only   [place] [phone]   [place] [phone]   [place] [phone]   [place] [phone]     Please reach out to the following centers to schedule your imaging appointment:       Imaging (DEXA, CT, MRI, XRAY)    [facility] ([facility], [facility], [place]) [phone]   [facility] ([place], [place]) [phone]   [facility] ([facility]) [phone]   [facility] ([facility]) [phone]     Appointment Reminders:  * Please bring meter with for staff to download  * If you are due ONLY for an A1C, it is scheduled with the nurse and will be done in clinic. You do not need to schedule a lab appointment. Fasting is not required for an A1C.  * Refill request should be submitted to your pharmacy. They will contact clinic for approval.

## 2023-04-03 NOTE — NURSING NOTE
Brigitte Robles's goals for this visit include:   Chief Complaint   Patient presents with     Diabetes     She requests these members of her care team be copied on today's visit information: No    PCP: Kehr, Kristen M    Referring Provider:  No referring provider defined for this encounter.    /79 (BP Location: Left arm, Patient Position: Sitting, Cuff Size: Adult Large)   Pulse 75   Wt 100.1 kg (220 lb 9.6 oz)   LMP 09/22/2021 (Approximate)   SpO2 96%   BMI 36.15 kg/m      Do you need any medication refills at today's visit? No

## 2023-04-03 NOTE — LETTER
4/3/2023         RE: Brigitte Robles  [address] [facility] [address]        Dear Colleague,    Thank you for referring your patient, Brigitte Robles, to the [facility]. Please see a copy of my visit note below.

## 2023-04-03 NOTE — PROGRESS NOTES
Endocrinology Note         Brigitte is a 47 year old female who has a visit for type 1 diabetes.    HPI  Ms Brigitte Robles is a 47 years old female who has a visit for type 1 diabetes. Last seen 9/2022    She was noted to have increased blurred vision and fatigue in February 2016 and noted to have blood glucose of 300s and A1C of 7.0%. She was initially diagnosed with type 2 diabetes and was started on metformin 500 mg bid which did not control her blood glucose. She was then seen by her PCP who ran more blood test and noted for low c-peptide and positive MONTSERRAT ab and IA2 antibody. She was then started on insulin since.    Interval history:  She is currently using Tandem and Dexcom and is very pleased with it. At the last visit, Ozempic was started. She lost 20 lbs in the past 6 months. She said that it helps curbing her appetite and also smooth her glucose levels. She notices smaller postprandial glucose spike.    1) type 1 diabetes:  A1c is down to 6.8%. Reviewed Dexcom and Tandem. She is using Control IQ. Her pattern is postprandial hyperglycemia after lunch and dinner. Average glucose 165, SD 44, TIR 59%, time above range 40%, time below range 1%. Reviewed insulin pump, average insulin dose 74%, (basal 61% and bolus 39%).    Pump setting  Basal rate  Midnight: 1.4 unit/hr  0300 AM: 1.5 unit/hr  1000 PM: 1.5 unit/hr    ICR  Midnight 1 unit per 8 gram  0300 AM: 1 unit per 7 gram  1000 PM: 1 unit per 8 gram    Correction factor  Midnight: 1 unit per 50 mg/dl  0300 AM: 1 unit per 50 mg/dl  1000 PM: 1 unit per 50 mg/dl    Target  mg/dl    2) hypothyroidism: She has history of hypothyroidism for the past 11 years. It was diagnosed about 6 months postpartum. She is currently on Synthroid 125 mcg daily for 6 days and 1/2 pill for 1 day per week. Lab on 9/26/2022 showed TSH 1.43, FT4 1.01.    Past Medical History  Type 1 diabetes  Hypothyroidism  Hx of hysterectomy in 2021 due to heavy menses and  anemia.    Allergies  Allergies   Allergen Reactions     Levothyroxine      Dizzy to generic --- not to synthroid     Medications  Current Outpatient Medications   Medication Sig Dispense Refill     atorvastatin (LIPITOR) 10 MG tablet Take 1 tablet (10 mg) by mouth daily 90 tablet 3     blood glucose monitoring (ONETOUCH VERIO IQ) test strip Use to test blood sugar 10 times daily or as directed. 900 strip 3     citalopram (CELEXA) 20 MG tablet Take 1 tablet (20 mg) by mouth daily 90 tablet 3     Continuous Blood Gluc  (DEXCOM G6 ) MAGGY 1 Device continuous 1 Device 0     Continuous Blood Gluc Sensor (DEXCOM G6 SENSOR) MISC 1 each See Admin Instructions Change sensor every 10 days. 9 each 3     Continuous Blood Gluc Transmit (DEXCOM G6 TRANSMITTER) MISC 1 each every 3 months 1 each 3     insulin aspart (NOVOLOG VIAL) 100 UNITS/ML vial USE AS DIRECTED VIA INSULIN PUMP. TOTAL DAILY DOSE APPROX 80 UNITS 80 mL 3     INSULIN PUMP - OUTPATIENT Date Last Updated: 1/25/2021  Tandem  Pump Website Username: [email or web address]  Pump Website Password: [email or web address]       lisinopril (ZESTRIL) 5 MG tablet Take 1 tablet (5 mg) by mouth daily 90 tablet 3     ONETOUCH DELICA LANCETS 33G MISC 1 Box 6 times daily 100 each 12     Semaglutide, 1 MG/DOSE, 4 MG/3ML SOPN Inject 1 mg Subcutaneous once a week To be started after finishing 0.5 mg weekly 9 mL 3     SYNTHROID 125 MCG tablet TAKE 1 TABLET FOR 6 DAYS AND TAKE 0.5 TABLET 1 DAY OF THE WEEK 90 tablet 3     glucagon (GLUCAGON EMERGENCY) 1 MG kit Inject 1 mg into the muscle once for 1 dose 1 mg 0     insulin degludec (TRESIBA FLEXTOUCH) 200 UNIT/ML pen INJECT 30 UNITS SUBCUTANEOUS DAILY (Patient not taking: Reported on 4/3/2023) 15 mL 3     insulin pen needle (B-D U/F) 31G X 5 MM miscellaneous USE 4 DAILY OR AS DIRECTED. (Patient not taking: Reported on 4/3/2023) 400 each 3     Family History  Type 1 diabetes in her father, mother, paternal aunt, paternal  "uncle, brother and sister  Type 2 diabetes in her grandmother  Heart in her maternal grandfather and maternal grandmother  hypothyroid in her brother, father, maternal grandfather, maternal grandmother, mother, and sister.     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    ROS  10 points ROS were negative otherwise mentioned in HPI    Physical Exam  Vital signs:   /79 (BP Location: Left arm, Patient Position: Sitting, Cuff Size: Adult Large)   Pulse 75   Wt 100.1 kg (220 lb 9.6 oz)   LMP 09/22/2021 (Approximate)   SpO2 96%   BMI 36.15 kg/m    Estimated body mass index is 36.15 kg/m  as calculated from the following:    Height as of 1/23/23: 1.664 m (5' 5.5\").    Weight as of this encounter: 100.1 kg (220 lb 9.6 oz).   Constitutional: no distress, comfortable, pleasant   HEENT: no pallor, no jaundice  Thyroid: no thyroid enlargement, no nodule appreciated  CVS: RRR, S1,S2, no murmur  Lungs: CTA B/l  Abd: soft, NT, ND, no hepatomegaly  Skin: no jaundice   Neurological: normal gait, intact sensation per monofilament test bilaterally (exam 9/26/2022), no tremor  Psychological: appropriate mood     RESULTS     Latest Ref Rng 9/26/2022 4/3/2023   ENDO DIABETES      A1C, POC 4.3 - <5.7 % 7.5 !  6.8 !       Legend:  ! Abnormal     Latest Ref Rng 9/26/2022   ENDO DIABETES     Cholesterol <200 mg/dL 199    LDL Cholesterol Calculated <=100 mg/dL 100    HDL Cholesterol >=50 mg/dL 63    Non HDL Cholesterol <130 mg/dL 136 (H)    VLDL-Cholesterol 0 - 30 mg/dL    Triglycerides <150 mg/dL 178 (H)    TRIG (EXT) <150 mg/dL 178 (H)    Creatinine 0.52 - 1.04 mg/dL 0.74    Hematocrit 35.0 - 47.0 %    Hemoglobin 11.7 - 15.7 g/dL    Albumin Urine mg/L mg/L <5    Albumin Urine mg/g Cr  --    Potassium 3.4 - 5.3 mmol/L 3.8       Legend:  (H) High      ASSESSMENT:    Ms Brigitte Robles is a 47 years old female who has a visit for follow up type 1 diabetes and " hypothyroidism    1) type 1 diabetes: diagnosed type 1 diabetes in Feb 2016. A1c is down to 6.8%. after adding Ozempic, her postprandial spike is smaller and she has no more fluctuation of glucose levels.     Continue insulin pump setting as follows    Plan:  Pump setting  Basal rate  Midnight: 1.4 unit/hr  0300 AM: 1.5 unit/hr  1000 PM: 1.5 unit/hr    ICR  Midnight 1 unit per 8 gram  0300 AM: 1 unit per 7 gram  1000 PM: 1 unit per 8 gram    Correction factor  Midnight: 1 unit per 50 mg/dl  0300 AM: 1 unit per 50 mg/dl  1000 PM: 1 unit per 50 mg/dl    - continue using Control IQ  - will check lab next visit    2) Hypothyroidism:clinically euthyroid. Lab was normal in 9/2022. Continue Synthroid 125 mcg x6 days per week and 1/2 pill for 1 day per week.  - will check lab today    3) Obesity: BMI 34. Encourage to continue on healthy diet and exercise.   - started on Ozempic at the last visit. Lost 20 lbs in the past 6 months. No side effects.  - continue Ozempic 1.0 mg weekly    PLAN:   - continue current insulin pump  - continue Ozempic 1.0 mg weekly  - check TFT today  - lab for  lipid panel, urine microalb, Cr in 6 months  - RTC 6 months     External notes/medical records independently reviewed, labs and imaging independently reviewed, medical management and tests to be discussed/communicated to patient.    Time: I spent 27 minutes on the date of the encounter preparing to see patient (including chart review and preparation), obtaining and or reviewing additional medical history, performing a physical exam and evaluation, documenting clinical information in the electronic health record, independently interpreting results, communicating results to the patient and coordinating care.    Lloyd Ledesma MD  Endocrinology

## 2023-04-04 DIAGNOSIS — E06.3 HYPOTHYROIDISM DUE TO HASHIMOTO'S THYROIDITIS: Primary | ICD-10-CM

## 2023-04-04 RX ORDER — LEVOTHYROXINE SODIUM 125 MCG
TABLET ORAL
Qty: 90 TABLET | Refills: 3 | Status: SHIPPED | OUTPATIENT
Start: 2023-04-04 | End: 2024-07-09

## 2023-05-05 ENCOUNTER — MYC MEDICAL ADVICE (OUTPATIENT)
Dept: FAMILY MEDICINE | Facility: CLINIC | Age: 48
End: 2023-05-05
Payer: COMMERCIAL

## 2023-05-05 DIAGNOSIS — Z12.11 COLON CANCER SCREENING: Primary | ICD-10-CM

## 2023-05-15 ENCOUNTER — TELEPHONE (OUTPATIENT)
Dept: GASTROENTEROLOGY | Facility: CLINIC | Age: 48
End: 2023-05-15
Payer: COMMERCIAL

## 2023-05-15 NOTE — TELEPHONE ENCOUNTER
Screening Questions  BLUE  KIND OF PREP RED  LOCATION [review exclusion criteria] GREEN  SEDATION TYPE        NO Are you active on mychart?       Kehr, Kristen M, PA-C Ordering/Referring Provider?        BCBS What type of coverage do you have?      N Have you had a positive covid test in the last 14 days?     36.6 1. BMI  [BMI 40+ - review exclusion criteria& smart-phrase document]    Y  2. Are you able to give consent for your medical care? [IF NO,RN REVIEW]          N  3. Are you taking any prescription pain medications on a routine schedule   (ex narcotics: oxycodone, roxicodone, oxycontin,  and percocet)? [RN Review]        NA  3a. EXTENDED PREP What kind of prescription?     N 4. Do you have any chemical dependencies such as alcohol, street drugs, or methadone?        **If yes 3- 5 , please schedule with MAC sedation.**          IF YES TO ANY 6 - 10 - HOSPITAL SETTING ONLY.     N 6.   Do you need assistance transferring?     N 7.   Have you had a heart or lung transplant?    N 8.   Are you currently on dialysis?   N 9.   Do you use daily home oxygen?   N 10. Do you take nitroglycerin?   10a. NA If yes, how often?     N 11. Are you currently pregnant?    11a. NA If yes, how many weeks? [ Greater than 12 weeks, OR NEEDED]    N 12. Do you have Pulmonary Hypertension? *NEED PAC APPT AT [facility] w/ MAC*     N 13. [review exclusion criteria]  Do you have any implantable devices in your body (pacemaker, defib, LVAD)?    N 14. In the past 6 months, have you had any heart related issues including cardiomyopathy or heart attack?     14a. NA If yes, did it require cardiac stenting if so when?     N 15. Have you had a stroke or Transient ischemic attack (TIA - aka  mini stroke ) within 6 months?      N 16. Do you have mod to severe Obstructive Sleep Apnea?  [Hospital only]    N 17. Do you have SEVERE AND UNCONTROLLED asthma? *NEED PAC APPT AT [facility] w/MAC*     18.Do you take blood thinners?  No    N 19. Do you take the  "medication named Phentermine?    19a. If yes, \"Hold for 7 days before procedure.  Please consult your prescribing provider if you have questions about holding this medication.\"     N  20. Do you have chronic kidney disease?      Y TYPE 1  21. Do you have a diagnosis of diabetes?     N  22. On a regular basis do you go 3-5 days between bowel movements?      23. Preferred LOCAL Pharmacy for Pre Prescription         [id number] - [place], [place] - [address] [place] [address] AT [facility]          - CLOSING REMINDERS -    You will receive a call from a Nurse to review instructions and health history.  This assessment must be completed prior to your procedure.  Failure to complete the Nurse assessment may result in the procedure being cancelled.      On the day of your procedure, please designatean adult(s) who can drive you home stay with you for the next 24 hours. The medicines used in the exam will make you sleepy. You will not be able to drive.      You cannot take public transportation, ride share services, or non-medical taxi service without a responsible caregiver.  Medical transport services are allowed with the requirement that a responsible caregiver will receive you at your destination.  We require that drivers and caregivers are confirmed prior to your procedure.      - SCHEDULING DETAILS -    NO Hospital Setting Required & If yes, what is the exclusion?   VIKRAM  Surgeon    7/18/23  Date of Procedure  Lower Endoscopy [Colonoscopy]  [facility]ALAX GATORADE WITHOUT MAGNEISUM CITRATE Which Colonoscopy Prep was Sent?     MODERATE Sedation Type     N PAC / Pre-op Required                 "

## 2023-07-05 ENCOUNTER — LAB (OUTPATIENT)
Dept: LAB | Facility: CLINIC | Age: 48
End: 2023-07-05
Payer: COMMERCIAL

## 2023-07-05 DIAGNOSIS — E06.3 HYPOTHYROIDISM DUE TO HASHIMOTO'S THYROIDITIS: ICD-10-CM

## 2023-07-05 LAB
T4 FREE SERPL-MCNC: 1.03 NG/DL (ref 0.9–1.7)
TSH SERPL DL<=0.005 MIU/L-ACNC: 2.04 UIU/ML (ref 0.3–4.2)

## 2023-07-05 PROCEDURE — 84439 ASSAY OF FREE THYROXINE: CPT

## 2023-07-05 PROCEDURE — 36415 COLL VENOUS BLD VENIPUNCTURE: CPT

## 2023-07-05 PROCEDURE — 84443 ASSAY THYROID STIM HORMONE: CPT

## 2023-07-07 ENCOUNTER — TELEPHONE (OUTPATIENT)
Dept: GASTROENTEROLOGY | Facility: CLINIC | Age: 48
End: 2023-07-07

## 2023-07-07 DIAGNOSIS — Z12.11 ENCOUNTER FOR SCREENING COLONOSCOPY: Primary | ICD-10-CM

## 2023-07-07 RX ORDER — BISACODYL 5 MG/1
TABLET, DELAYED RELEASE ORAL
Qty: 4 TABLET | Refills: 0 | Status: SHIPPED | OUTPATIENT
Start: 2023-07-07 | End: 2023-10-09

## 2023-07-07 NOTE — TELEPHONE ENCOUNTER
Pre assessment completed for upcoming procedure.   (Please see previous telephone encounter notes for complete details)    Patient  returned call.       Procedure details:    Arrival time and facility location reviewed    Pre op exam needed? N/A    Designated  policy reviewed. Instructed to have someone stay 6 hours post procedure.     COVID policy reviewed.      Medication review:    Medications reviewed. Please see supporting documentation below. Holding recommendations discussed (if applicable).       Prep for procedure:     Reviewed procedure prep instructions.       Additional information needed?  N/A      Patient  verbalized understanding and had no questions or concerns at this time.      Concepción Miller RN  Endoscopy Procedure Pre Assessment RN  [phone] option 4

## 2023-07-07 NOTE — TELEPHONE ENCOUNTER
Called the Pt to complete Pre-Assessment. First Attempt. No answer, Left message.     Pre visit planning completed.      Procedure details:    Patient scheduled for Colonoscopy  on 7/18/23.     Arrival time: 0715. Procedure time 0800    Pre op exam needed? N/A    Facility location: [facility]; [address], [place] [address]    Sedation type: Conscious sedation     Indication for procedure: Screening      Chart review:     Electronic implanted devices? No    Diabetic? Yes- see medication holding recommendations     Diabetic medication HOLDING recommendations: (if applicable)  Oral diabetic medications: No  Diabetic injectables: Yes- Ozempic (Semaglutide). Weekly dosing of medication.  Hold 7 days before procedure.  Follow up with managing provider.   Insulin: Yes - Consult with managing provider       Medication review:    Anticoagulants? No    NSAIDS? No NSAID medications per patient's medication list.  RN will verify with pre-assessment call.    Other medication HOLDING recommendations:  N/A      Prep for procedure:     Bowel prep recommendation: Jg Douglas  -- Type 1 Diabetes    Prep instructions sent via [employer] Bowel prep script sent to    [id number] - [place], [place] - [address]  Endoscopy Procedure Pre Assessment RN  [phone] option 4

## 2023-07-18 ENCOUNTER — HOSPITAL ENCOUNTER (OUTPATIENT)
Facility: AMBULATORY SURGERY CENTER | Age: 48
Discharge: HOME | End: 2023-07-18
Attending: SURGERY
Payer: COMMERCIAL

## 2023-07-18 ENCOUNTER — ANESTHESIA EVENT (OUTPATIENT)
Dept: SURGERY | Facility: AMBULATORY SURGERY CENTER | Age: 48
End: 2023-07-18
Payer: COMMERCIAL

## 2023-07-18 ENCOUNTER — ANESTHESIA (OUTPATIENT)
Dept: SURGERY | Facility: AMBULATORY SURGERY CENTER | Age: 48
End: 2023-07-18
Payer: COMMERCIAL

## 2023-07-18 VITALS
SYSTOLIC BLOOD PRESSURE: 132 MMHG | OXYGEN SATURATION: 99 % | HEART RATE: 70 BPM | TEMPERATURE: 97 F | RESPIRATION RATE: 16 BRPM | DIASTOLIC BLOOD PRESSURE: 80 MMHG

## 2023-07-18 VITALS — HEART RATE: 81 BPM

## 2023-07-18 LAB
COLONOSCOPY: NORMAL
GLUCOSE BLDC GLUCOMTR-MCNC: 218 MG/DL (ref 70–99)

## 2023-07-18 PROCEDURE — G0121 COLON CA SCRN NOT HI RSK IND: HCPCS | Performed by: SURGERY

## 2023-07-18 PROCEDURE — G8907 PT DOC NO EVENTS ON DISCHARG: HCPCS

## 2023-07-18 PROCEDURE — 45378 DIAGNOSTIC COLONOSCOPY: CPT

## 2023-07-18 PROCEDURE — G8918 PT W/O PREOP ORDER IV AB PRO: HCPCS

## 2023-07-18 RX ORDER — LIDOCAINE HYDROCHLORIDE 20 MG/ML
INJECTION, SOLUTION INFILTRATION; PERINEURAL PRN
Status: DISCONTINUED | OUTPATIENT
Start: 2023-07-18 | End: 2023-07-18

## 2023-07-18 RX ORDER — ONDANSETRON 2 MG/ML
4 INJECTION INTRAMUSCULAR; INTRAVENOUS
Status: DISCONTINUED | OUTPATIENT
Start: 2023-07-18 | End: 2023-07-19 | Stop reason: HOSPADM

## 2023-07-18 RX ORDER — PROPOFOL 10 MG/ML
INJECTION, EMULSION INTRAVENOUS CONTINUOUS PRN
Status: DISCONTINUED | OUTPATIENT
Start: 2023-07-18 | End: 2023-07-18

## 2023-07-18 RX ORDER — PROPOFOL 10 MG/ML
INJECTION, EMULSION INTRAVENOUS PRN
Status: DISCONTINUED | OUTPATIENT
Start: 2023-07-18 | End: 2023-07-18

## 2023-07-18 RX ORDER — PROCHLORPERAZINE MALEATE 10 MG
10 TABLET ORAL EVERY 6 HOURS PRN
Status: DISCONTINUED | OUTPATIENT
Start: 2023-07-18 | End: 2023-07-19 | Stop reason: HOSPADM

## 2023-07-18 RX ORDER — NALOXONE HYDROCHLORIDE 0.4 MG/ML
0.4 INJECTION, SOLUTION INTRAMUSCULAR; INTRAVENOUS; SUBCUTANEOUS
Status: DISCONTINUED | OUTPATIENT
Start: 2023-07-18 | End: 2023-07-19 | Stop reason: HOSPADM

## 2023-07-18 RX ORDER — ONDANSETRON 4 MG/1
4 TABLET, ORALLY DISINTEGRATING ORAL EVERY 6 HOURS PRN
Status: DISCONTINUED | OUTPATIENT
Start: 2023-07-18 | End: 2023-07-19 | Stop reason: HOSPADM

## 2023-07-18 RX ORDER — NALOXONE HYDROCHLORIDE 0.4 MG/ML
0.2 INJECTION, SOLUTION INTRAMUSCULAR; INTRAVENOUS; SUBCUTANEOUS
Status: DISCONTINUED | OUTPATIENT
Start: 2023-07-18 | End: 2023-07-19 | Stop reason: HOSPADM

## 2023-07-18 RX ORDER — FLUMAZENIL 0.1 MG/ML
0.2 INJECTION, SOLUTION INTRAVENOUS
Status: ACTIVE | OUTPATIENT
Start: 2023-07-18 | End: 2023-07-18

## 2023-07-18 RX ORDER — ONDANSETRON 2 MG/ML
4 INJECTION INTRAMUSCULAR; INTRAVENOUS EVERY 6 HOURS PRN
Status: DISCONTINUED | OUTPATIENT
Start: 2023-07-18 | End: 2023-07-19 | Stop reason: HOSPADM

## 2023-07-18 RX ORDER — LIDOCAINE 40 MG/G
CREAM TOPICAL
Status: DISCONTINUED | OUTPATIENT
Start: 2023-07-18 | End: 2023-07-19 | Stop reason: HOSPADM

## 2023-07-18 RX ORDER — SODIUM CHLORIDE, SODIUM LACTATE, POTASSIUM CHLORIDE, CALCIUM CHLORIDE 600; 310; 30; 20 MG/100ML; MG/100ML; MG/100ML; MG/100ML
INJECTION, SOLUTION INTRAVENOUS CONTINUOUS
Status: DISCONTINUED | OUTPATIENT
Start: 2023-07-18 | End: 2023-07-19 | Stop reason: HOSPADM

## 2023-07-18 RX ADMIN — LIDOCAINE HYDROCHLORIDE 40 MG: 20 INJECTION, SOLUTION INFILTRATION; PERINEURAL at 08:02

## 2023-07-18 RX ADMIN — SODIUM CHLORIDE, SODIUM LACTATE, POTASSIUM CHLORIDE, CALCIUM CHLORIDE: 600; 310; 30; 20 INJECTION, SOLUTION INTRAVENOUS at 07:59

## 2023-07-18 RX ADMIN — PROPOFOL 100 MG: 10 INJECTION, EMULSION INTRAVENOUS at 08:02

## 2023-07-18 RX ADMIN — PROPOFOL 150 MCG/KG/MIN: 10 INJECTION, EMULSION INTRAVENOUS at 08:02

## 2023-07-18 RX ADMIN — PROPOFOL 50 MG: 10 INJECTION, EMULSION INTRAVENOUS at 08:03

## 2023-07-18 NOTE — H&P
Patient seen for Endoscopy    HPI:  Patient is a 47 year old female here for endoscopy. Not taking blood thinning medications. No MI or CVA history. No issues with previous sedation. No recent acute illness.    Review Of Systems    Skin: negative  Ears/Nose/Throat: negative  Respiratory: No shortness of breath, dyspnea on exertion, cough, or hemoptysis  Cardiovascular: negative  Gastrointestinal: negative  Genitourinary: negative  Musculoskeletal: negative  Neurologic: negative  Hematologic/Lymphatic/Immunologic: negative  Endocrine: negative      Past Medical History:   Diagnosis Date    Anxiety     DEPRESSIVE DISORDER NEC 2/8/2003    Type 1 diabetes mellitus without complication (H) 2/17/2016    Unspecified hypothyroidism 6/2003       Past Surgical History:   Procedure Laterality Date    ABDOMEN SURGERY  11.12.2021    Hysterectomy    HC REMOVAL OF TONSILS,12+ Y/O         Family History   Problem Relation Age of Onset    Diabetes Mother     Hypertension Mother     Thyroid Disease Mother     Diabetes Father         1    Thyroid Disease Father         BORIS'S    Diabetes Sister     Thyroid Disease Sister     Heart Disease Maternal Grandmother         TRIPLE BYPASS    Thyroid Disease Maternal Grandmother     Diabetes Maternal Grandmother         2    Heart Disease Maternal Grandfather         TRIPLE BYPASS    Coronary Artery Disease Maternal Grandfather     Thyroid Disease Maternal Grandfather     Thyroid Disease Brother     Diabetes Paternal Aunt     Diabetes Paternal Uncle     Diabetes Brother         1    Thyroid Disease Brother     Diabetes Nephew         1    Depression Sister     Thyroid Disease Sister     Diabetes Sister         1    Breast Cancer No family hx of     Cancer - colorectal No family hx of        Social History     Socioeconomic History    Marital status:      Spouse name: Dougie    Number of children: 1    Years of education: 18    Highest education level: Not on file   Occupational  History    Occupation: MLT     Comment: [employer]   Tobacco Use    Smoking status: Never     Passive exposure: Past    Smokeless tobacco: Never   Vaping Use    Vaping Use: Never used   Substance and Sexual Activity    Alcohol use: Yes     Comment: rare    Drug use: No    Sexual activity: Yes     Partners: Male     Birth control/protection: Male Surgical, Female Surgical     Comment:  had vascetomy 9 yrs ago   Other Topics Concern    Parent/sibling w/ CABG, MI or angioplasty before 65F 55M? No     Service No    Blood Transfusions No    Caffeine Concern No    Occupational Exposure Yes    Hobby Hazards No    Sleep Concern No    Stress Concern No    Weight Concern No    Special Diet No    Back Care No    Exercise Yes     Comment: Cardio, walking    Bike Helmet No    Seat Belt Yes    Self-Exams No     Comment: Every three months   Social History Narrative    Not on file     Social Determinants of Health     Financial Resource Strain: Not on file   Food Insecurity: Not on file   Transportation Needs: Not on file   Physical Activity: Not on file   Stress: Not on file   Social Connections: Not on file   Intimate Partner Violence: Not on file   Housing Stability: Not on file       Current Outpatient Medications   Medication Sig Dispense Refill    atorvastatin (LIPITOR) 10 MG tablet Take 1 tablet (10 mg) by mouth daily 90 tablet 3    citalopram (CELEXA) 20 MG tablet Take 1 tablet (20 mg) by mouth daily 90 tablet 3    lisinopril (ZESTRIL) 5 MG tablet Take 1 tablet (5 mg) by mouth daily 90 tablet 3    SYNTHROID 125 MCG tablet TAKE 1 TABLET FOR 6 DAYs 90 tablet 3    bisacodyl (DULCOLAX) 5 MG EC tablet Take 2 tablets at 3 pm the day before your procedure. If your procedure is before 11 am, take 2 additional tablets at 11 pm. If your procedure is after 11 am, take 2 additional tablets at 6 am. For additional instructions refer to your colonoscopy prep instructions. 4 tablet 0    blood glucose monitoring  (ONETOUCH VERIO IQ) test strip Use to test blood sugar 10 times daily or as directed. 900 strip 3    Continuous Blood Gluc  (DEXCOM G6 ) MAGGY 1 Device continuous 1 Device 0    Continuous Blood Gluc Sensor (DEXCOM G6 SENSOR) MISC 1 each See Admin Instructions Change sensor every 10 days. 9 each 3    Continuous Blood Gluc Transmit (DEXCOM G6 TRANSMITTER) MISC 1 each every 3 months 1 each 3    glucagon (GLUCAGON EMERGENCY) 1 MG kit Inject 1 mg into the muscle once for 1 dose 1 mg 0    insulin aspart (NOVOLOG VIAL) 100 UNITS/ML vial USE AS DIRECTED VIA INSULIN PUMP. TOTAL DAILY DOSE APPROX 80 UNITS 80 mL 3    insulin degludec (TRESIBA FLEXTOUCH) 200 UNIT/ML pen INJECT 30 UNITS SUBCUTANEOUS DAILY (Patient not taking: Reported on 4/3/2023) 15 mL 3    insulin pen needle (B-D U/F) 31G X 5 MM miscellaneous USE 4 DAILY OR AS DIRECTED. (Patient not taking: Reported on 4/3/2023) 400 each 3    INSULIN PUMP - OUTPATIENT Date Last Updated: 1/25/2021  [employer]  Pump Website Username: [email or web address]  Pump Website Password: [email or web address]      ONETOUCH DELICA LANCETS 33G MISC 1 Box 6 times daily 100 each 12    polyethylene glycol (GOLYTELY) 236 g suspension The night before the exam at 6 pm drink an 8-ounce glass every 15 minutes until the jug is half empty. If you arrive before 11 AM: Drink the other half of the Golytely jug at 11 PM night before procedure. If you arrive after 11 AM: Drink the other half of the Golytely jug at 6 AM day of procedure. For additional instructions refer to your colonoscopy prep instructions. 4000 mL 0    Semaglutide, 1 MG/DOSE, 4 MG/3ML SOPN Inject 1 mg Subcutaneous once a week To be started after finishing 0.5 mg weekly 9 mL 3       Medications and history reviewed    Physical exam:  Vitals: /71   Temp 97  F (36.1  C) (Temporal)   Resp 16   LMP 09/22/2021 (Approximate)   SpO2 98%   BMI= There is no height or weight on file to calculate BMI.    Constitutional:  Healthy, alert, non-distressed   Head: Normo-cephalic, atraumatic, no lesions, masses or tenderness   Cardiovascular: RRR, no new murmurs, +S1, +S2 heart sounds, no clicks, rubs or gallops   Respiratory: CTAB, no rales, rhonchi or wheezing, equal chest rise, good respiratory effort   Gastrointestinal: Soft, non-tender, non distended, no rebound rigidity or guarding, no masses or hernias palpated   : Deferred  Musculoskeletal: Moves all extremities, normal  strength, no deformities noted   Skin: No suspicious lesions or rashes   Psychiatric: Mentation appears normal, affect appropriate   Hematologic/Lymphatic/Immunologic: Normal cervical and supraclavicular lymph nodes   Patient able to get up on table without difficulty.    Labs show:  Results for orders placed or performed during the hospital encounter of 07/18/23 (from the past 24 hour(s))   Glucose by meter   Result Value Ref Range    GLUCOSE BY METER POCT 218 (H) 70 - 99 mg/dL       Assessment: Endoscopy  Plan: Pt cleared for anesthesia for proposed procedure.    Flavio Small, DO

## 2023-07-18 NOTE — ANESTHESIA CARE TRANSFER NOTE
Patient: Brigitte Robles    Procedure: Procedure(s):  Colonoscopy with CO2 insufflation       Diagnosis: Colon cancer screening [Z12.11]  Diagnosis Additional Information: No value filed.    Anesthesia Type:   MAC     Note:    Oropharynx: oropharynx clear of all foreign objects and spontaneously breathing  Level of Consciousness: awake and drowsy  Oxygen Supplementation: room air    Independent Airway: airway patency satisfactory and stable  Dentition: dentition unchanged  Vital Signs Stable: post-procedure vital signs reviewed and stable  Report to RN Given: handoff report given  Patient transferred to: Phase II    Handoff Report: Identifed the Patient, Identified the Reponsible Provider, Reviewed the pertinent medical history, Discussed the surgical course, Reviewed Intra-OP anesthesia mangement and issues during anesthesia, Set expectations for post-procedure period and Allowed opportunity for questions and acknowledgement of understanding      Vitals:  Vitals Value Taken Time   BP     Temp     Pulse     Resp     SpO2         Electronically Signed By: GAMALIEL Landaverde CRNA  July 18, 2023  8:16 AM

## 2023-07-18 NOTE — ANESTHESIA PREPROCEDURE EVALUATION
Anesthesia Pre-Procedure Evaluation    Patient: Brigitte Robles   MRN: [id number] : 1975        Procedure : Procedure(s):  Colonoscopy with CO2 insufflation          Past Medical History:   Diagnosis Date     Anxiety      DEPRESSIVE DISORDER NEC 2003     Type 1 diabetes mellitus without complication (H) 2016     Unspecified hypothyroidism 2003      Past Surgical History:   Procedure Laterality Date     ABDOMEN SURGERY  2021    Hysterectomy     HC REMOVAL OF TONSILS,12+ Y/O        Allergies   Allergen Reactions     Levothyroxine      Dizzy to generic --- not to synthroid      Social History     Tobacco Use     Smoking status: Never     Passive exposure: Past     Smokeless tobacco: Never   Substance Use Topics     Alcohol use: Yes     Comment: rare      Wt Readings from Last 1 Encounters:   23 100.1 kg (220 lb 9.6 oz)        Anesthesia Evaluation            ROS/MED HX  ENT/Pulmonary:     (+) allergic rhinitis,     Neurologic:  - neg neurologic ROS     Cardiovascular:  - neg cardiovascular ROS     METS/Exercise Tolerance: >4 METS    Hematologic:  - neg hematologic  ROS     Musculoskeletal:  - neg musculoskeletal ROS     GI/Hepatic:  - neg GI/hepatic ROS     Renal/Genitourinary:  - neg Renal ROS     Endo:     (+) type I DM, thyroid problem, Obesity,     Psychiatric/Substance Use:     (+) psychiatric history anxiety     Infectious Disease:  - neg infectious disease ROS     Malignancy:  - neg malignancy ROS     Other:  - neg other ROS          Physical Exam    Airway  airway exam normal      Mallampati: I   TM distance: > 3 FB   Neck ROM: full   Mouth opening: > 3 cm    Respiratory Devices and Support         Dental       (+) Completely normal teeth      Cardiovascular   cardiovascular exam normal       Rhythm and rate: regular and normal     Pulmonary   pulmonary exam normal        breath sounds clear to auscultation           OUTSIDE LABS:  CBC:   Lab Results   Component Value Date    WBC  9.5 09/21/2021    WBC 5.8 09/25/2017    HGB 11.1 (L) 09/21/2021    HGB 11.9 09/25/2017    HCT 34.6 (L) 09/21/2021    HCT 36.1 09/25/2017     (H) 09/21/2021     09/25/2017     BMP:   Lab Results   Component Value Date     09/26/2022     07/11/2019    POTASSIUM 3.8 09/26/2022    POTASSIUM 3.5 07/11/2019    CHLORIDE 106 09/26/2022    CHLORIDE 106 07/11/2019    CO2 24 09/26/2022    CO2 25 07/11/2019    BUN 15 09/26/2022    BUN 9 07/11/2019    CR 0.74 09/26/2022    CR 0.92 12/14/2021    GLC 97 09/26/2022     (H) 07/11/2019     COAGS: No results found for: PTT, INR, FIBR  POC: No results found for: BGM, HCG, HCGS  HEPATIC:   Lab Results   Component Value Date    ALBUMIN 3.5 05/08/2019    PROTTOTAL 8.1 05/08/2019    ALT 23 05/08/2019    AST 11 05/08/2019    ALKPHOS 61 05/08/2019    BILITOTAL 0.4 05/08/2019     OTHER:   Lab Results   Component Value Date    A1C 7.6 (H) 12/14/2021    CHRIS 9.2 09/26/2022    LIPASE 245 02/05/2016    TSH 2.04 07/05/2023    T4 1.03 07/05/2023    T3 85 08/31/2011       Anesthesia Plan    ASA Status:  2   NPO Status:  NPO Appropriate    Anesthesia Type: MAC.     - Reason for MAC: Deep or markedly invasive procedure (G8)   Induction: Propofol.   Maintenance: N/A.        Consents    Anesthesia Plan(s) and associated risks, benefits, and realistic alternatives discussed. Questions answered and patient/representative(s) expressed understanding.    - Discussed:     - Discussed with:  Patient    Use of blood products discussed: No .     Postoperative Care            Comments:           H&P reviewed: Unable to attach H&P to encounter due to EHR limitations. H&P Update: appropriate H&P reviewed, patient examined. No interval changes since H&P (within 30 days).         John Herbert DO

## 2023-07-18 NOTE — ANESTHESIA POSTPROCEDURE EVALUATION
Patient: Brigitte Robles    Procedure: Procedure(s):  Colonoscopy with CO2 insufflation       Anesthesia Type:  MAC    Note:  Disposition: Outpatient   Postop Pain Control: Uneventful            Sign Out: Well controlled pain   PONV: No   Neuro/Psych: Uneventful            Sign Out: Acceptable/Baseline neuro status   Airway/Respiratory: Uneventful            Sign Out: Acceptable/Baseline resp. status   CV/Hemodynamics: Uneventful            Sign Out: Acceptable CV status; No obvious hypovolemia; No obvious fluid overload   Other NRE: NONE   DID A NON-ROUTINE EVENT OCCUR? No           Last vitals:  Vitals Value Taken Time   /80 07/18/23 0841   Temp     Pulse 70 07/18/23 0841   Resp 16 07/18/23 0841   SpO2 99 % 07/18/23 0841       Electronically Signed By: John Herbert DO  July 18, 2023  10:19 AM

## 2023-09-15 ENCOUNTER — TRANSFERRED RECORDS (OUTPATIENT)
Dept: MULTI SPECIALTY CLINIC | Facility: CLINIC | Age: 48
End: 2023-09-15

## 2023-09-15 LAB — RETINOPATHY: NORMAL

## 2023-10-04 NOTE — PROGRESS NOTES
Outcome for 10/04/23 2:42 PM: Data uploaded on Dexcom - printed  Marielena Matthew CMA  Adult Endocrinology  [facility] [place]

## 2023-10-09 ENCOUNTER — VIRTUAL VISIT (OUTPATIENT)
Dept: ENDOCRINOLOGY | Facility: CLINIC | Age: 48
End: 2023-10-09
Payer: COMMERCIAL

## 2023-10-09 VITALS
SYSTOLIC BLOOD PRESSURE: 118 MMHG | HEART RATE: 79 BPM | OXYGEN SATURATION: 98 % | WEIGHT: 222.9 LBS | BODY MASS INDEX: 36.53 KG/M2 | DIASTOLIC BLOOD PRESSURE: 80 MMHG

## 2023-10-09 DIAGNOSIS — E10.9 TYPE 1 DIABETES MELLITUS WITHOUT COMPLICATION (H): ICD-10-CM

## 2023-10-09 DIAGNOSIS — E06.3 HYPOTHYROIDISM DUE TO HASHIMOTO'S THYROIDITIS: ICD-10-CM

## 2023-10-09 DIAGNOSIS — Z23 NEED FOR PROPHYLACTIC VACCINATION AND INOCULATION AGAINST INFLUENZA: Primary | ICD-10-CM

## 2023-10-09 LAB
CHOLEST SERPL-MCNC: 173 MG/DL
CREAT SERPL-MCNC: 0.86 MG/DL (ref 0.51–0.95)
CREAT UR-MCNC: 54 MG/DL
EGFRCR SERPLBLD CKD-EPI 2021: 83 ML/MIN/1.73M2
HBA1C MFR BLD: 7.5 % (ref 4.3–?)
HDLC SERPL-MCNC: 66 MG/DL
LDLC SERPL CALC-MCNC: 83 MG/DL
MICROALBUMIN UR-MCNC: <12 MG/L
MICROALBUMIN/CREAT UR: NORMAL MG/G{CREAT}
NONHDLC SERPL-MCNC: 107 MG/DL
T4 FREE SERPL-MCNC: 1.05 NG/DL (ref 0.9–1.7)
TRIGL SERPL-MCNC: 118 MG/DL
TSH SERPL DL<=0.005 MIU/L-ACNC: 2.32 UIU/ML (ref 0.3–4.2)

## 2023-10-09 PROCEDURE — 82043 UR ALBUMIN QUANTITATIVE: CPT | Mod: VID | Performed by: INTERNAL MEDICINE

## 2023-10-09 PROCEDURE — 80061 LIPID PANEL: CPT | Mod: VID | Performed by: INTERNAL MEDICINE

## 2023-10-09 PROCEDURE — 84443 ASSAY THYROID STIM HORMONE: CPT | Mod: VID | Performed by: INTERNAL MEDICINE

## 2023-10-09 PROCEDURE — 82565 ASSAY OF CREATININE: CPT | Mod: VID | Performed by: INTERNAL MEDICINE

## 2023-10-09 PROCEDURE — 84439 ASSAY OF FREE THYROXINE: CPT | Mod: VID | Performed by: INTERNAL MEDICINE

## 2023-10-09 PROCEDURE — 82570 ASSAY OF URINE CREATININE: CPT | Mod: VID | Performed by: INTERNAL MEDICINE

## 2023-10-09 PROCEDURE — 36415 COLL VENOUS BLD VENIPUNCTURE: CPT | Mod: VID | Performed by: INTERNAL MEDICINE

## 2023-10-09 PROCEDURE — 90686 IIV4 VACC NO PRSV 0.5 ML IM: CPT | Mod: VID | Performed by: INTERNAL MEDICINE

## 2023-10-09 PROCEDURE — 99213 OFFICE O/P EST LOW 20 MIN: CPT | Mod: 25 | Performed by: INTERNAL MEDICINE

## 2023-10-09 PROCEDURE — 83036 HEMOGLOBIN GLYCOSYLATED A1C: CPT | Mod: VID | Performed by: INTERNAL MEDICINE

## 2023-10-09 PROCEDURE — 90471 IMMUNIZATION ADMIN: CPT | Mod: VID | Performed by: INTERNAL MEDICINE

## 2023-10-09 RX ORDER — ATORVASTATIN CALCIUM 10 MG/1
10 TABLET, FILM COATED ORAL DAILY
Qty: 90 TABLET | Refills: 3 | Status: SHIPPED | OUTPATIENT
Start: 2023-10-09 | End: 2024-08-05

## 2023-10-09 RX ORDER — INSULIN ASPART 100 [IU]/ML
INJECTION, SOLUTION INTRAVENOUS; SUBCUTANEOUS
Qty: 80 ML | Refills: 3 | Status: SHIPPED | OUTPATIENT
Start: 2023-10-09 | End: 2024-09-16

## 2023-10-09 RX ORDER — PROCHLORPERAZINE 25 MG/1
1 SUPPOSITORY RECTAL SEE ADMIN INSTRUCTIONS
Qty: 9 EACH | Refills: 3 | Status: SHIPPED | OUTPATIENT
Start: 2023-10-09 | End: 2024-08-05

## 2023-10-09 RX ORDER — INSULIN DEGLUDEC 200 U/ML
INJECTION, SOLUTION SUBCUTANEOUS
Qty: 15 ML | Refills: 3 | Status: SHIPPED | OUTPATIENT
Start: 2023-10-09

## 2023-10-09 RX ORDER — PROCHLORPERAZINE 25 MG/1
1 SUPPOSITORY RECTAL
Qty: 1 EACH | Refills: 3 | Status: SHIPPED | OUTPATIENT
Start: 2023-10-09 | End: 2024-08-05

## 2023-10-09 NOTE — PATIENT INSTRUCTIONS
Lab today    Return in 6 months    If you have any questions, please do not hesitate to call [facility] at [phone] and ask for Endocrinology clinic.    Sincerely,    Lloyd Ledesma MD  Endocrinology

## 2023-10-09 NOTE — LETTER
10/9/2023         RE: Brigitte Robles  [address] [facility] [address]        Dear Colleague,    Thank you for referring your patient, Brigitte Robles, to the [facility]. Please see a copy of my visit note below.            Endocrinology Note         Brigitte is a 47 year old female who has a visit for type 1 diabetes.    HPI  Ms Brigitte Robles is a 47 years old female who has a visit for type 1 diabetes.     She was noted to have increased blurred vision and fatigue in February 2016 and noted to have blood glucose of 300s and A1C of 7.0%. She was initially diagnosed with type 2 diabetes and was started on metformin 500 mg bid which did not control her blood glucose. She was then seen by her PCP who ran more blood test and noted for low c-peptide and positive MONTSERRAT ab and IA2 antibody. She was then started on insulin since.    Interval history:  She is currently using Tandem and Dexcom. She had to stopp Ozempic about 6 weeks ago due to nausea. She lost 20 lbs while on Ozempic.     She has been working on healthy diet and exercise. Started flatev for dinner -- more meat and veggie.  Walk daily -- 2 miles per day. She does not eat anything after 9 pm.    1) type 1 diabetes:  A1c is 7.5%. Reviewed Dexcom and Tandem. She is using Control IQ. Her pattern is postprandial hyperglycemia after lunch and dinner.         Pump setting  Basal rate  Midnight: 1.4 unit/hr  0300 AM: 1.5 unit/hr  1000 PM: 1.5 unit/hr    ICR  Midnight 1 unit per 8 gram  0300 AM: 1 unit per 7 gram  1000 PM: 1 unit per 8 gram    Correction factor  Midnight: 1 unit per 50 mg/dl  0300 AM: 1 unit per 50 mg/dl  1000 PM: 1 unit per 50 mg/dl    Target  mg/dl    2) hypothyroidism:  It was diagnosed about 6 months postpartum. Lab on 4/3/2023 showed TSH 0.09 FT4 1.3. She is currently on Synthroid 125 mcg daily for 6 days per week.     Past Medical History  Type 1 diabetes  Hypothyroidism  Hx of  hysterectomy in 2021 due to heavy menses and anemia.    Allergies  Allergies   Allergen Reactions    Levothyroxine      Dizzy to generic --- not to synthroid     Medications  Current Outpatient Medications   Medication Sig Dispense Refill    atorvastatin (LIPITOR) 10 MG tablet Take 1 tablet (10 mg) by mouth daily 90 tablet 3    bisacodyl (DULCOLAX) 5 MG EC tablet Take 2 tablets at 3 pm the day before your procedure. If your procedure is before 11 am, take 2 additional tablets at 11 pm. If your procedure is after 11 am, take 2 additional tablets at 6 am. For additional instructions refer to your colonoscopy prep instructions. 4 tablet 0    blood glucose monitoring (ONETOUCH VERIO IQ) test strip Use to test blood sugar 10 times daily or as directed. 900 strip 3    citalopram (CELEXA) 20 MG tablet Take 1 tablet (20 mg) by mouth daily 90 tablet 3    Continuous Blood Gluc  (DEXCOM G6 ) MAGGY 1 Device continuous 1 Device 0    Continuous Blood Gluc Sensor (DEXCOM G6 SENSOR) MISC 1 each See Admin Instructions Change sensor every 10 days. 9 each 3    Continuous Blood Gluc Transmit (DEXCOM G6 TRANSMITTER) MISC 1 each every 3 months 1 each 3    glucagon (GLUCAGON EMERGENCY) 1 MG kit Inject 1 mg into the muscle once for 1 dose 1 mg 0    insulin aspart (NOVOLOG VIAL) 100 UNITS/ML vial USE AS DIRECTED VIA INSULIN PUMP. TOTAL DAILY DOSE APPROX 80 UNITS 80 mL 3    insulin degludec (TRESIBA FLEXTOUCH) 200 UNIT/ML pen INJECT 30 UNITS SUBCUTANEOUS DAILY (Patient not taking: Reported on 4/3/2023) 15 mL 3    insulin pen needle (B-D U/F) 31G X 5 MM miscellaneous USE 4 DAILY OR AS DIRECTED. (Patient not taking: Reported on 4/3/2023) 400 each 3    INSULIN PUMP - OUTPATIENT Date Last Updated: 1/25/2021  Tandem  Pump Website Username: [email or web address]  Pump Website Password: [email or web address]      lisinopril (ZESTRIL) 5 MG tablet Take 1 tablet (5 mg) by mouth daily 90 tablet 3    ONETOUCH DELICA LANCETS 33G MISC 1  "Box 6 times daily 100 each 12    polyethylene glycol (GOLYTELY) 236 g suspension The night before the exam at 6 pm drink an 8-ounce glass every 15 minutes until the jug is half empty. If you arrive before 11 AM: Drink the other half of the Golytely jug at 11 PM night before procedure. If you arrive after 11 AM: Drink the other half of the Golytely jug at 6 AM day of procedure. For additional instructions refer to your colonoscopy prep instructions. 4000 mL 0    Semaglutide, 1 MG/DOSE, 4 MG/3ML SOPN Inject 1 mg Subcutaneous once a week To be started after finishing 0.5 mg weekly 9 mL 3    SYNTHROID 125 MCG tablet TAKE 1 TABLET FOR 6 DAYs 90 tablet 3     Family History  Type 1 diabetes in her father, mother, paternal aunt, paternal uncle, brother and sister  Type 2 diabetes in her grandmother  Heart in her maternal grandfather and maternal grandmother  hypothyroid in her brother, father, maternal grandfather, maternal grandmother, mother, and sister.     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    Weight as of 4/3/23: 100.1 kg (220 lb 9.6 oz).   Constitutional: no distress, comfortable, pleasant   HEENT: no pallor  Skin: no jaundice   Neurological:intact sensation per monofilament test bilaterally (exam 9/26/2022)  Psychological: appropriate mood     RESULTS      Latest Ref Rng 10/9/2023  12:40 PM   ENDO DIABETES     A1C, POC 4.3 - <5.7 % 7.5 !       Legend:  ! Abnormal   Latest Ref Rng 9/26/2022   ENDO DIABETES     Cholesterol <200 mg/dL 199  "   LDL Cholesterol Calculated <=100 mg/dL 100    HDL Cholesterol >=50 mg/dL 63    Non HDL Cholesterol <130 mg/dL 136 (H)    VLDL-Cholesterol 0 - 30 mg/dL    Triglycerides <150 mg/dL 178 (H)    TRIG (EXT) <150 mg/dL 178 (H)    Creatinine 0.52 - 1.04 mg/dL 0.74    Hematocrit 35.0 - 47.0 %    Hemoglobin 11.7 - 15.7 g/dL    Albumin Urine mg/L mg/L <5    Albumin Urine mg/g Cr  --    Potassium 3.4 - 5.3 mmol/L 3.8       ASSESSMENT:    Ms Brigitte Robels is a 48 years old female who has a visit for follow up type 1 diabetes and hypothyroidism    1) type 1 diabetes: diagnosed type 1 diabetes in Feb 2016. A1c is 7.5%. she could not no longer taking Ozempic due to persistent nausea.    Plan: change pump setting in bold  Pump setting  Basal rate  Midnight: 1.4 unit/hr  0300 AM: 1.5 unit/hr  1000 PM: 1.7 unit/hr    ICR  Midnight 1 unit per 8 gram  0300 AM: 1 unit per 7 gram  1000 PM: 1 unit per 8 gram    Correction factor  Midnight: 1 unit per 45 mg/dl  0300 AM: 1 unit per 45 mg/dl  1000 PM: 1 unit per 45 mg/dl    - continue using Control IQ  - will check lab today    2) Hypothyroidism:clinically euthyroid. Lab was normal in 9/2022. Continue Synthroid 125 mcg x6 days per week  - will check lab today    3) Obesity: BMI 34. Encourage to continue on healthy diet and exercise.   - Lost 20 lbs with Ozempic and had to stop about 6 weeks ago due to persistent nausea.  - continue healthy diet and exercise    PLAN:   - see insulin pump change above  - check TFT, lipid panel, urine microalb, Cr today  - RTC 6 months     Joined the call at 10/9/2023, 12:44:53 pm.  Left the call at 10/9/2023, 12:53:01 pm.  You were on the call for 8 minutes 7 seconds .    External notes/medical records independently reviewed, labs and imaging independently reviewed, medical management and tests to be discussed/communicated to patient.    Time: I spent 20 minutes on the date of the encounter preparing to see patient (including chart review and preparation),  obtaining and or reviewing additional medical history, performing a physical exam and evaluation, documenting clinical information in the electronic health record, independently interpreting results, communicating results to the patient and coordinating care.    Lloyd Ledesma MD  Endocrinology

## 2023-10-09 NOTE — PROGRESS NOTES
Endocrinology Note         Brigitte is a 47 year old female who has a visit for type 1 diabetes.    HPI  Ms Brigitte Robles is a 47 years old female who has a visit for type 1 diabetes.     She was noted to have increased blurred vision and fatigue in February 2016 and noted to have blood glucose of 300s and A1C of 7.0%. She was initially diagnosed with type 2 diabetes and was started on metformin 500 mg bid which did not control her blood glucose. She was then seen by her PCP who ran more blood test and noted for low c-peptide and positive MONTSERRAT ab and IA2 antibody. She was then started on insulin since.    Interval history:  She is currently using Tandem and Dexcom. She had to stopp Ozempic about 6 weeks ago due to nausea. She lost 20 lbs while on Ozempic.     She has been working on healthy diet and exercise. Started [employer] for dinner -- more meat and veggie.  Walk daily -- 2 miles per day. She does not eat anything after 9 pm.    1) type 1 diabetes:  A1c is 7.5%. Reviewed Dexcom and Tandem. She is using Control IQ. Her pattern is postprandial hyperglycemia after lunch and dinner.         Pump setting  Basal rate  Midnight: 1.4 unit/hr  0300 AM: 1.5 unit/hr  1000 PM: 1.5 unit/hr    ICR  Midnight 1 unit per 8 gram  0300 AM: 1 unit per 7 gram  1000 PM: 1 unit per 8 gram    Correction factor  Midnight: 1 unit per 50 mg/dl  0300 AM: 1 unit per 50 mg/dl  1000 PM: 1 unit per 50 mg/dl    Target  mg/dl    2) hypothyroidism:  It was diagnosed about 6 months postpartum. Lab on 4/3/2023 showed TSH 0.09 FT4 1.3. She is currently on Synthroid 125 mcg daily for 6 days per week.     Past Medical History  Type 1 diabetes  Hypothyroidism  Hx of hysterectomy in 2021 due to heavy menses and anemia.    Allergies  Allergies   Allergen Reactions    Levothyroxine      Dizzy to generic --- not to synthroid     Medications  Current Outpatient Medications   Medication Sig Dispense Refill    atorvastatin (LIPITOR) 10 MG tablet Take  1 tablet (10 mg) by mouth daily 90 tablet 3    bisacodyl (DULCOLAX) 5 MG EC tablet Take 2 tablets at 3 pm the day before your procedure. If your procedure is before 11 am, take 2 additional tablets at 11 pm. If your procedure is after 11 am, take 2 additional tablets at 6 am. For additional instructions refer to your colonoscopy prep instructions. 4 tablet 0    blood glucose monitoring (ONETOUCH VERIO IQ) test strip Use to test blood sugar 10 times daily or as directed. 900 strip 3    citalopram (CELEXA) 20 MG tablet Take 1 tablet (20 mg) by mouth daily 90 tablet 3    Continuous Blood Gluc  (DEXCOM G6 ) MAGGY 1 Device continuous 1 Device 0    Continuous Blood Gluc Sensor (DEXCOM G6 SENSOR) MISC 1 each See Admin Instructions Change sensor every 10 days. 9 each 3    Continuous Blood Gluc Transmit (DEXCOM G6 TRANSMITTER) MISC 1 each every 3 months 1 each 3    glucagon (GLUCAGON EMERGENCY) 1 MG kit Inject 1 mg into the muscle once for 1 dose 1 mg 0    insulin aspart (NOVOLOG VIAL) 100 UNITS/ML vial USE AS DIRECTED VIA INSULIN PUMP. TOTAL DAILY DOSE APPROX 80 UNITS 80 mL 3    insulin degludec (TRESIBA FLEXTOUCH) 200 UNIT/ML pen INJECT 30 UNITS SUBCUTANEOUS DAILY (Patient not taking: Reported on 4/3/2023) 15 mL 3    insulin pen needle (B-D U/F) 31G X 5 MM miscellaneous USE 4 DAILY OR AS DIRECTED. (Patient not taking: Reported on 4/3/2023) 400 each 3    INSULIN PUMP - OUTPATIENT Date Last Updated: 1/25/2021  Tandem  Pump Website Username: [email or web address]  Pump Website Password: [email or web address]      lisinopril (ZESTRIL) 5 MG tablet Take 1 tablet (5 mg) by mouth daily 90 tablet 3    ONETOUCH DELICA LANCETS 33G MISC 1 Box 6 times daily 100 each 12    polyethylene glycol (GOLYTELY) 236 g suspension The night before the exam at 6 pm drink an 8-ounce glass every 15 minutes until the jug is half empty. If you arrive before 11 AM: Drink the other half of the Golytely jug at 11 PM night before  "procedure. If you arrive after 11 AM: Drink the other half of the [employer] jug at 6 AM day of procedure. For additional instructions refer to your colonoscopy prep instructions. 4000 mL 0    Semaglutide, 1 MG/DOSE, 4 MG/3ML SOPN Inject 1 mg Subcutaneous once a week To be started after finishing 0.5 mg weekly 9 mL 3    SYNTHROID 125 MCG tablet TAKE 1 TABLET FOR 6 DAYs 90 tablet 3     Family History  Type 1 diabetes in her father, mother, paternal aunt, paternal uncle, brother and sister  Type 2 diabetes in her grandmother  Heart in her maternal grandfather and maternal grandmother  hypothyroid in her brother, father, maternal grandfather, maternal grandmother, mother, and sister.     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    Weight as of 4/3/23: 100.1 kg (220 lb 9.6 oz).   Constitutional: no distress, comfortable, pleasant   HEENT: no pallor  Skin: no jaundice   Neurological:intact sensation per monofilament test bilaterally (exam 9/26/2022)  Psychological: appropriate mood     RESULTS      Latest Ref Rng 10/9/2023  12:40 PM   ENDO DIABETES     A1C, POC 4.3 - <5.7 % 7.5 !       Legend:  ! Abnormal   Latest Ref Rng 9/26/2022   ENDO DIABETES     Cholesterol <200 mg/dL 199    LDL Cholesterol Calculated <=100 mg/dL 100    HDL Cholesterol >=50 mg/dL 63    Non HDL Cholesterol <130 mg/dL 136 (H)    VLDL-Cholesterol 0 - 30 mg/dL    Triglycerides <150 mg/dL 178 (H)    TRIG (EXT) <150 mg/dL 178 (H)    Creatinine 0.52 - 1.04 mg/dL 0.74    Hematocrit " 35.0 - 47.0 %    Hemoglobin 11.7 - 15.7 g/dL    Albumin Urine mg/L mg/L <5    Albumin Urine mg/g Cr  --    Potassium 3.4 - 5.3 mmol/L 3.8       ASSESSMENT:    Ms Brigitte Robles is a 48 years old female who has a visit for follow up type 1 diabetes and hypothyroidism    1) type 1 diabetes: diagnosed type 1 diabetes in Feb 2016. A1c is 7.5%. she could not no longer taking Ozempic due to persistent nausea.    Plan: change pump setting in bold  Pump setting  Basal rate  Midnight: 1.4 unit/hr  0300 AM: 1.5 unit/hr  1000 PM: 1.7 unit/hr    ICR  Midnight 1 unit per 8 gram  0300 AM: 1 unit per 7 gram  1000 PM: 1 unit per 8 gram    Correction factor  Midnight: 1 unit per 45 mg/dl  0300 AM: 1 unit per 45 mg/dl  1000 PM: 1 unit per 45 mg/dl    - continue using Control IQ  - will check lab today    2) Hypothyroidism:clinically euthyroid. Lab was normal in 9/2022. Continue Synthroid 125 mcg x6 days per week  - will check lab today    3) Obesity: BMI 34. Encourage to continue on healthy diet and exercise.   - Lost 20 lbs with Ozempic and had to stop about 6 weeks ago due to persistent nausea.  - continue healthy diet and exercise    PLAN:   - see insulin pump change above  - check TFT, lipid panel, urine microalb, Cr today  - RTC 6 months     Joined the call at 10/9/2023, 12:44:53 pm.  Left the call at 10/9/2023, 12:53:01 pm.  You were on the call for 8 minutes 7 seconds .    External notes/medical records independently reviewed, labs and imaging independently reviewed, medical management and tests to be discussed/communicated to patient.    Time: I spent 20 minutes on the date of the encounter preparing to see patient (including chart review and preparation), obtaining and or reviewing additional medical history, performing a physical exam and evaluation, documenting clinical information in the electronic health record, independently interpreting results, communicating results to the patient and coordinating care.    Lloyd  MD Baltazar  Endocrinology

## 2023-10-10 ENCOUNTER — ANCILLARY PROCEDURE (OUTPATIENT)
Dept: MAMMOGRAPHY | Facility: CLINIC | Age: 48
End: 2023-10-10
Attending: PHYSICIAN ASSISTANT
Payer: COMMERCIAL

## 2023-10-10 DIAGNOSIS — Z12.31 VISIT FOR SCREENING MAMMOGRAM: ICD-10-CM

## 2023-10-10 PROCEDURE — 77063 BREAST TOMOSYNTHESIS BI: CPT | Performed by: RADIOLOGY

## 2023-10-10 PROCEDURE — 77067 SCR MAMMO BI INCL CAD: CPT | Performed by: RADIOLOGY

## 2023-11-13 ENCOUNTER — TELEPHONE (OUTPATIENT)
Dept: ENDOCRINOLOGY | Facility: CLINIC | Age: 48
End: 2023-11-13
Payer: COMMERCIAL

## 2023-11-13 NOTE — TELEPHONE ENCOUNTER
Prior Authorization Approval    Medication: DEXCOM G6 SENSOR MISC  Authorization Effective Date: 11/13/2023  Authorization Expiration Date: 11/13/2024  Approved Dose/Quantity: 3/30 days   Reference #: [id number]   Insurance Company: [employer] Non-Specialty PA's - Phone [phone] Fax [phone]  Expected CoPay: $    CoPay Card Available:      Financial Assistance Needed: no  Which Pharmacy is filling the prescription:    Pharmacy Notified: no  Patient Notified: no

## 2023-11-13 NOTE — TELEPHONE ENCOUNTER
PA Initiation    Medication: DEXCOM G6 SENSOR MISC  Insurance Company: [employer]Specialty [employer] - Phone [phone] Fax [phone]  Pharmacy Filling the Rx:    Filling Pharmacy Phone:    Filling Pharmacy Fax:    Start Date: 11/13/2023    Key: [id number]

## 2023-12-27 ENCOUNTER — E-VISIT (OUTPATIENT)
Dept: FAMILY MEDICINE | Facility: CLINIC | Age: 48
End: 2023-12-27
Payer: COMMERCIAL

## 2023-12-27 DIAGNOSIS — U07.1 INFECTION DUE TO 2019 NOVEL CORONAVIRUS: Primary | ICD-10-CM

## 2023-12-27 PROCEDURE — 99422 OL DIG E/M SVC 11-20 MIN: CPT | Performed by: PHYSICIAN ASSISTANT

## 2023-12-28 NOTE — TELEPHONE ENCOUNTER
-- Message is from the [facility]--    Reason for Call:  Patient is calling to speak to doctor after surgery since she did not get the chance to see doctor after surgery. Please call to advise     Caller Information       Type Contact Phone    03/29/2019 09:30 AM Phone (Incoming) Susan PADILLA (Self) [phone] (H)          Alternative phone number:     Turnaround time given to caller:   \"This message will be sent to [state Provider's name]. The clinical team will fulfill your request as soon as they review your message.\"     Provider E-Visit time total (minutes): 15

## 2024-01-10 ENCOUNTER — E-VISIT (OUTPATIENT)
Dept: FAMILY MEDICINE | Facility: CLINIC | Age: 49
End: 2024-01-10
Payer: COMMERCIAL

## 2024-01-10 DIAGNOSIS — J01.90 ACUTE SINUSITIS, RECURRENCE NOT SPECIFIED, UNSPECIFIED LOCATION: Primary | ICD-10-CM

## 2024-01-10 PROCEDURE — 99421 OL DIG E/M SVC 5-10 MIN: CPT | Performed by: PHYSICIAN ASSISTANT

## 2024-01-10 NOTE — PATIENT INSTRUCTIONS
Acute Sinusitis: Care Instructions  Overview     Acute sinusitis is an inflammation of the mucous membranes inside the nose and sinuses. Sinuses are the hollow spaces in your skull around the eyes and nose. Acute sinusitis often follows a cold. Acute sinusitis causes thick, discolored mucus that drains from the nose or down the back of the throat. It also can cause pain and pressure in your head and face along with a stuffy or blocked nose.  In most cases, sinusitis gets better on its own in 1 to 2 weeks. But some mild symptoms may last for several weeks. Sometimes antibiotics are needed if there is a bacterial infection.  Follow-up care is a key part of your treatment and safety. Be sure to make and go to all appointments, and call your doctor if you are having problems. It's also a good idea to know your test results and keep a list of the medicines you take.  How can you care for yourself at home?  Use saline (saltwater) nasal washes. This can help keep your nasal passages open and wash out mucus and allergens.  You can buy saline nose washes at a grocery store or drugstore. Follow the instructions on the package.  You can make your own at home. Add 1 teaspoon of non-iodized salt and 1 teaspoon of baking soda to 2 cups of distilled or boiled and cooled water. Fill a squeeze bottle or a nasal cleansing pot (such as a neti pot) with the nasal wash. Then put the tip into your nostril, and lean over the sink. With your mouth open, gently squirt the liquid. Repeat on the other side.  Try a decongestant nasal spray like oxymetazoline (Afrin). Do not use it for more than 3 days in a row. Using it for more than 3 days can make your congestion worse.  If needed, take an over-the-counter pain medicine, such as acetaminophen (Tylenol), ibuprofen (Advil, Motrin), or naproxen (Aleve). Read and follow all instructions on the label.  If the doctor prescribed antibiotics, take them as directed. Do not stop taking them just  "because you feel better. You need to take the full course of antibiotics.  Be careful when taking over-the-counter cold or flu medicines and Tylenol at the same time. Many of these medicines have acetaminophen, which is Tylenol. Read the labels to make sure that you are not taking more than the recommended dose. Too much acetaminophen (Tylenol) can be harmful.  Try a steroid nasal spray. It may help with your symptoms.  Breathe warm, moist air. You can use a steamy shower, a hot bath, or a sink filled with hot water. Avoid cold, dry air. Using a humidifier in your home may help. Follow the directions for cleaning the machine.  When should you call for help?   Call your doctor now or seek immediate medical care if:    You have new or worse swelling, redness, or pain in your face or around one or both of your eyes.     You have double vision or a change in your vision.     You have a high fever.     You have a severe headache and a stiff neck.     You have mental changes, such as feeling confused or much less alert.   Watch closely for changes in your health, and be sure to contact your doctor if:    You are not getting better as expected.   Where can you learn more?  Go to https://www.Disc[employer].net/patiented  Enter I933 in the search box to learn more about \"Acute Sinusitis: Care Instructions.\"  Current as of: February 28, 2023               Content Version: 13.8    0808-2125 [employer].   Care instructions adapted under license by your healthcare professional. If you have questions about a medical condition or this instruction, always ask your healthcare professional. [employer] disclaims any warranty or liability for your use of this information.      Dear Brigitte Robles    After reviewing your responses, I've been able to diagnose you with Acute sinusitis, recurrence not specified, unspecified location.      Based on your responses and diagnosis, I have prescribed   Orders Placed " This Encounter   Medications     amoxicillin-clavulanate (AUGMENTIN) 875-125 MG tablet     Sig: Take 1 tablet by mouth 2 times daily for 7 days     Dispense:  14 tablet     Refill:  0      to treat your symptoms. I have sent this to your pharmacy.?     It is also important to stay well hydrated, get lots of rest and take over-the-counter decongestants,?tylenol?or ibuprofen if you?are able to?take those medications per your primary care provider to help relieve discomfort.?     It is important that you take?all of?your prescribed medication even if your symptoms are improving after a few doses.? Taking?all of?your medicine helps prevent the symptoms from returning.?     If your symptoms worsen, you develop severe headache, vomiting, high fever (>102), or are not improving in 7 days, please contact your primary care provider for an appointment or visit any of our convenient Walk-in Care or Urgent Care Centers to be seen which can be found on our website?here.?     Thanks again for choosing?us?as your health care partner,?   ?  Kristen M. Kehr, PA-C?   Thank you for choosing us for your care. I have placed an order for a prescription so that you can start treatment. View your full visit summary for details by clicking on the link below. Your pharmacist will able to address any questions you may have about the medication.     If you're not feeling better within 5-7 days, please schedule an appointment.  You can schedule an appointment right here in [facility], or call [phone]  If the visit is for the same symptoms as your eVisit, we'll refund the cost of your eVisit if seen within seven days.

## 2024-02-15 ENCOUNTER — E-VISIT (OUTPATIENT)
Dept: FAMILY MEDICINE | Facility: CLINIC | Age: 49
End: 2024-02-15
Payer: COMMERCIAL

## 2024-02-15 DIAGNOSIS — M54.41 ACUTE RIGHT-SIDED LOW BACK PAIN WITH RIGHT-SIDED SCIATICA: Primary | ICD-10-CM

## 2024-02-15 PROCEDURE — 99422 OL DIG E/M SVC 11-20 MIN: CPT | Performed by: PHYSICIAN ASSISTANT

## 2024-02-15 RX ORDER — CYCLOBENZAPRINE HCL 10 MG
10 TABLET ORAL 3 TIMES DAILY PRN
Qty: 30 TABLET | Refills: 0 | Status: SHIPPED | OUTPATIENT
Start: 2024-02-15

## 2024-02-15 RX ORDER — PREDNISONE 20 MG/1
40 TABLET ORAL DAILY
Qty: 10 TABLET | Refills: 0 | Status: SHIPPED | OUTPATIENT
Start: 2024-02-15 | End: 2024-02-20

## 2024-03-15 DIAGNOSIS — E10.9 TYPE 1 DIABETES MELLITUS WITHOUT COMPLICATION (H): ICD-10-CM

## 2024-03-15 NOTE — LETTER
March 18, 2024    Brigitte Robles  [address] [place] [id number]    Dear Brigitte,     We recently received a refill request for Lisinopril.  We have refilled this for a one time 90 day supply only because you are due for a:    Medication Recheck office visit.    Please call at your earliest convenience so that there will not be a delay with your future refills.    Thank you,         Your [facility] Team/[facility]  [phone]

## 2024-03-17 DIAGNOSIS — F41.1 GAD (GENERALIZED ANXIETY DISORDER): ICD-10-CM

## 2024-03-17 DIAGNOSIS — F32.0 MILD MAJOR DEPRESSION (H): ICD-10-CM

## 2024-03-18 RX ORDER — CITALOPRAM HYDROBROMIDE 20 MG/1
20 TABLET ORAL DAILY
Qty: 90 TABLET | Refills: 0 | Status: SHIPPED | OUTPATIENT
Start: 2024-03-18 | End: 2024-06-17

## 2024-03-18 RX ORDER — LISINOPRIL 5 MG/1
5 TABLET ORAL DAILY
Qty: 90 TABLET | Refills: 0 | Status: SHIPPED | OUTPATIENT
Start: 2024-03-18 | End: 2024-06-17

## 2024-03-18 NOTE — TELEPHONE ENCOUNTER
Letter sent with previous refill notice for lisinopril.  Appointment needed within the next 90 days.   Kristen Kehr PA-C

## 2024-04-11 DIAGNOSIS — E10.9 TYPE 1 DIABETES MELLITUS WITHOUT COMPLICATION (H): ICD-10-CM

## 2024-04-11 DIAGNOSIS — E66.812 CLASS 2 SEVERE OBESITY WITH SERIOUS COMORBIDITY AND BODY MASS INDEX (BMI) OF 36.0 TO 36.9 IN ADULT, UNSPECIFIED OBESITY TYPE (H): Primary | ICD-10-CM

## 2024-04-11 DIAGNOSIS — E66.01 CLASS 2 SEVERE OBESITY WITH SERIOUS COMORBIDITY AND BODY MASS INDEX (BMI) OF 36.0 TO 36.9 IN ADULT, UNSPECIFIED OBESITY TYPE (H): Primary | ICD-10-CM

## 2024-04-13 ENCOUNTER — HEALTH MAINTENANCE LETTER (OUTPATIENT)
Age: 49
End: 2024-04-13

## 2024-04-19 RX ORDER — SEMAGLUTIDE 1.34 MG/ML
INJECTION, SOLUTION SUBCUTANEOUS
Qty: 9 ML | Refills: 3 | Status: SHIPPED | OUTPATIENT
Start: 2024-04-19 | End: 2024-04-29

## 2024-04-19 NOTE — TELEPHONE ENCOUNTER
OZEMPIC 4 MG/3 ML (1 MG/DOSE)       Last Office Visit: 10/9/23  Future Office visit:   4/29/24      GLP-1 Agonists Protocol [id number] 08:05 PM   Protocol Details HgbA1C in past 3 or 6 months    Medication is active on med list    Medication indicated for associated diagnosis        Routing refill request to provider for review/approval because:  Drug not active on patient's medication list  Not on refill protocol    Hanny Patel RN  P Red Flag Triage/MRT

## 2024-04-29 ENCOUNTER — OFFICE VISIT (OUTPATIENT)
Dept: ENDOCRINOLOGY | Facility: CLINIC | Age: 49
End: 2024-04-29
Payer: COMMERCIAL

## 2024-04-29 VITALS
DIASTOLIC BLOOD PRESSURE: 91 MMHG | SYSTOLIC BLOOD PRESSURE: 138 MMHG | HEART RATE: 76 BPM | WEIGHT: 231 LBS | OXYGEN SATURATION: 97 % | RESPIRATION RATE: 18 BRPM | BODY MASS INDEX: 37.86 KG/M2

## 2024-04-29 DIAGNOSIS — E10.9 TYPE 1 DIABETES MELLITUS WITHOUT COMPLICATION (H): ICD-10-CM

## 2024-04-29 DIAGNOSIS — E66.812 CLASS 2 SEVERE OBESITY WITH SERIOUS COMORBIDITY AND BODY MASS INDEX (BMI) OF 36.0 TO 36.9 IN ADULT, UNSPECIFIED OBESITY TYPE (H): Primary | ICD-10-CM

## 2024-04-29 DIAGNOSIS — E66.01 CLASS 2 SEVERE OBESITY WITH SERIOUS COMORBIDITY AND BODY MASS INDEX (BMI) OF 36.0 TO 36.9 IN ADULT, UNSPECIFIED OBESITY TYPE (H): Primary | ICD-10-CM

## 2024-04-29 DIAGNOSIS — E06.3 HYPOTHYROIDISM DUE TO HASHIMOTO'S THYROIDITIS: ICD-10-CM

## 2024-04-29 LAB — HBA1C MFR BLD: 7.6 %

## 2024-04-29 PROCEDURE — 83036 HEMOGLOBIN GLYCOSYLATED A1C: CPT | Performed by: INTERNAL MEDICINE

## 2024-04-29 PROCEDURE — 99214 OFFICE O/P EST MOD 30 MIN: CPT | Performed by: INTERNAL MEDICINE

## 2024-04-29 RX ORDER — ACYCLOVIR 400 MG/1
TABLET ORAL
Qty: 9 EACH | Refills: 3 | Status: SHIPPED | OUTPATIENT
Start: 2024-04-29
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
## 2024-04-29 NOTE — PATIENT INSTRUCTIONS
Start Wegovy 0.25 mg weekly x4 weeks;  increase to 0.5 mg weekly x4 weeks;  increase to 1.0 mg weekly for 4 weeks;  increase to 1.7 mg weekly for 4 weeks;  increase to 2.4 mg weekly thereafter     If you have any questions, please do not hesitate to call [facility] at [phone] and ask for Endocrinology clinic.    Sincerely,    Lloyd Ledesma MD  Endocrinology
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
## 2024-04-29 NOTE — NURSING NOTE
Brigitte Robles's goals for this visit include:   Chief Complaint   Patient presents with    Follow Up     DMI       She requests these members of her care team be copied on today's visit information: yes    PCP: Kehr, Kristen M    Referring Provider:  No referring provider defined for this encounter.    BP (!) 138/91 (BP Location: Left arm, Patient Position: Sitting, Cuff Size: Adult Large)   Pulse 76   Resp 18   Wt 104.8 kg (231 lb)   LMP 09/22/2021 (Approximate)   SpO2 97%   BMI 37.86 kg/m      Do you need any medication refills at today's visit? None    León ePralta, EMT

## 2024-06-14 DIAGNOSIS — E10.9 TYPE 1 DIABETES MELLITUS WITHOUT COMPLICATION (H): ICD-10-CM

## 2024-06-14 DIAGNOSIS — F32.0 MILD MAJOR DEPRESSION (H): ICD-10-CM

## 2024-06-14 DIAGNOSIS — F41.1 GAD (GENERALIZED ANXIETY DISORDER): ICD-10-CM

## 2024-06-17 RX ORDER — CITALOPRAM HYDROBROMIDE 20 MG/1
20 TABLET ORAL DAILY
Qty: 30 TABLET | Refills: 0 | Status: SHIPPED | OUTPATIENT
Start: 2024-06-17 | End: 2024-06-19

## 2024-06-17 RX ORDER — LISINOPRIL 5 MG/1
5 TABLET ORAL DAILY
Qty: 30 TABLET | Refills: 0 | Status: SHIPPED | OUTPATIENT
Start: 2024-06-17 | End: 2024-07-23

## 2024-06-18 DIAGNOSIS — F41.1 GAD (GENERALIZED ANXIETY DISORDER): ICD-10-CM

## 2024-06-18 DIAGNOSIS — F32.0 MILD MAJOR DEPRESSION (H): ICD-10-CM

## 2024-06-18 NOTE — TELEPHONE ENCOUNTER
Medication Question or Refill        What medication are you calling about (include dose and sig)?:   citalopram (CELEXA) 20 MG tablet 30 tablet 0 6/17/2024 -- No   Sig - Route: TAKE 1 TABLET BY MOUTH EVERY DAY - Oral     Preferred Pharmacy:   [facility]/pharmacy #[id number] - [place] - [address] [facility]  [address] [address]  Phone: [phone] Fax: [phone]    Controlled Substance Agreement on file:   CSA -- Patient Level:    CSA: None found at the patient level.       Who prescribed the medication?: Kristen Kehr    Do you have any questions or concerns?  Yes: Fax received from [facility] with the following Pharmacy Comments:    Alternative requested.  Plan requires 90 day supply for maintenance refills.  Please send #[phone]

## 2024-06-19 RX ORDER — CITALOPRAM HYDROBROMIDE 20 MG/1
20 TABLET ORAL DAILY
Qty: 90 TABLET | Refills: 0 | Status: SHIPPED | OUTPATIENT
Start: 2024-06-19 | End: 2024-08-05

## 2024-06-23 ENCOUNTER — NURSE TRIAGE (OUTPATIENT)
Dept: NURSING | Facility: CLINIC | Age: 49
End: 2024-06-23
Payer: COMMERCIAL

## 2024-06-23 NOTE — TELEPHONE ENCOUNTER
Nurse Triage SBAR    Is this a 2nd Level Triage? YES, LICENSED PRACTITIONER REVIEW IS REQUIRED    Situation: Medication Question    Background: :Patient's insulin pump stopped working today.    Assessment: Patient calling to inquire on what dosage she should  use for her Tresiba. She was using 80 units daily before using her pump. She does not have questions on dosage of Novolog. BG is currently 180.    Protocol Recommended Disposition:   According to the protocol, patient should discuss with endocrinologist on-call..      Dr Jeronimo Umaña to call back patient.    Ching Webb RN  06/23/24 7:23 PM  [facility] Nurse Advisor      Reason for Disposition   [1] Caller has URGENT medicine question about med that PCP or specialist prescribed AND [2] triager unable to answer question    Additional Information   Negative: [1] Intentional drug overdose AND [2] suicidal thoughts or ideas   Negative: Drug overdose and triager unable to answer question   Negative: Caller requesting a renewal or refill of a medicine patient is currently taking   Negative: Caller requesting information unrelated to medicine   Negative: Caller requesting information about COVID-19 Vaccine   Negative: Caller requesting information about Emergency Contraception   Negative: Caller requesting information about Combined Birth Control Pills   Negative: Caller requesting information about Progestin Birth Control Pills   Negative: Caller requesting information about Post-Op pain or medicines   Negative: Caller requesting a prescription antibiotic (such as Penicillin) for Strep throat and has a positive culture result   Negative: Caller requesting a prescription anti-viral med (such as Tamiflu) and has influenza (flu) symptoms   Negative: Immunization reaction suspected   Negative: Rash while taking a medicine or within 3 days of stopping it   Negative: [1] Asthma and [2] having symptoms of asthma (cough, wheezing, etc.)   Negative: [1] Symptom  of illness (e.g., headache, abdominal pain, earache, vomiting) AND [2] more than mild   Negative: Breastfeeding questions about mother's medicines and diet   Negative: MORE THAN A DOUBLE DOSE of a prescription or over-the-counter (OTC) drug   Negative: [1] DOUBLE DOSE (an extra dose or lesser amount) of prescription drug AND [2] any symptoms (e.g., dizziness, nausea, pain, sleepiness)   Negative: [1] DOUBLE DOSE (an extra dose or lesser amount) of over-the-counter (OTC) drug AND [2] any symptoms (e.g., dizziness, nausea, pain, sleepiness)   Negative: Took another person's prescription drug   Negative: [1] DOUBLE DOSE (an extra dose or lesser amount) of prescription drug AND [2] NO symptoms  (Exception: A double dose of antibiotics.)   Negative: Diabetes drug error or overdose (e.g., took wrong type of insulin or took extra dose)   Negative: [1] Prescription not at pharmacy AND [2] was prescribed by PCP recently (Exception: Triager has access to EMR and prescription is recorded there. Go to Home Care and confirm for pharmacy.)   Negative: [1] Pharmacy calling with prescription question AND [2] triager unable to answer question    Protocols used: Medication Question Call-A-

## 2024-07-01 DIAGNOSIS — E06.3 HYPOTHYROIDISM DUE TO HASHIMOTO'S THYROIDITIS: ICD-10-CM

## 2024-07-09 RX ORDER — LEVOTHYROXINE SODIUM 125 MCG
TABLET ORAL
Qty: 84 TABLET | Refills: 2 | Status: SHIPPED | OUTPATIENT
Start: 2024-07-09

## 2024-07-09 NOTE — TELEPHONE ENCOUNTER
Thyroid Protocol Passed   SYNTHROID 125 MCG tablet   90 tablet 3 4/4/2023       Last Office Visit : 4-  Future Office visit:  11-

## 2024-07-23 DIAGNOSIS — E10.9 TYPE 1 DIABETES MELLITUS WITHOUT COMPLICATION (H): ICD-10-CM

## 2024-07-23 RX ORDER — LISINOPRIL 5 MG/1
5 TABLET ORAL DAILY
Qty: 30 TABLET | Refills: 0 | Status: SHIPPED | OUTPATIENT
Start: 2024-07-23 | End: 2024-07-23

## 2024-07-23 NOTE — TELEPHONE ENCOUNTER
Received fax from pharmacy that 30 day of lisinopril (ZESTRIL) 5 MG tablet is NOT covered by insurance. Requesting 90 day refill be sent to pending pharmacy.        Hiro Medina

## 2024-07-24 RX ORDER — LISINOPRIL 5 MG/1
5 TABLET ORAL DAILY
Qty: 90 TABLET | Refills: 0 | Status: SHIPPED | OUTPATIENT
Start: 2024-07-24 | End: 2024-08-05

## 2024-08-05 ENCOUNTER — OFFICE VISIT (OUTPATIENT)
Dept: FAMILY MEDICINE | Facility: CLINIC | Age: 49
End: 2024-08-05
Payer: COMMERCIAL

## 2024-08-05 VITALS
WEIGHT: 240 LBS | HEART RATE: 77 BPM | BODY MASS INDEX: 37.67 KG/M2 | TEMPERATURE: 96.9 F | HEIGHT: 67 IN | SYSTOLIC BLOOD PRESSURE: 110 MMHG | OXYGEN SATURATION: 98 % | DIASTOLIC BLOOD PRESSURE: 72 MMHG

## 2024-08-05 DIAGNOSIS — F32.0 MILD MAJOR DEPRESSION (H): ICD-10-CM

## 2024-08-05 DIAGNOSIS — E10.9 TYPE 1 DIABETES MELLITUS WITHOUT COMPLICATION (H): ICD-10-CM

## 2024-08-05 DIAGNOSIS — F41.1 GAD (GENERALIZED ANXIETY DISORDER): ICD-10-CM

## 2024-08-05 LAB
ALBUMIN SERPL BCG-MCNC: 4 G/DL (ref 3.5–5.2)
ALP SERPL-CCNC: 60 U/L (ref 40–150)
ALT SERPL W P-5'-P-CCNC: 17 U/L (ref 0–50)
ANION GAP SERPL CALCULATED.3IONS-SCNC: 11 MMOL/L (ref 7–15)
AST SERPL W P-5'-P-CCNC: 17 U/L (ref 0–45)
BILIRUB SERPL-MCNC: 0.3 MG/DL
BUN SERPL-MCNC: 17.3 MG/DL (ref 6–20)
CALCIUM SERPL-MCNC: 9.3 MG/DL (ref 8.8–10.4)
CHLORIDE SERPL-SCNC: 101 MMOL/L (ref 98–107)
CHOLEST SERPL-MCNC: 187 MG/DL
CREAT SERPL-MCNC: 0.94 MG/DL (ref 0.51–0.95)
EGFRCR SERPLBLD CKD-EPI 2021: 75 ML/MIN/1.73M2
FASTING STATUS PATIENT QL REPORTED: YES
FASTING STATUS PATIENT QL REPORTED: YES
GLUCOSE SERPL-MCNC: 119 MG/DL (ref 70–99)
HCO3 SERPL-SCNC: 23 MMOL/L (ref 22–29)
HDLC SERPL-MCNC: 64 MG/DL
LDLC SERPL CALC-MCNC: 100 MG/DL
NONHDLC SERPL-MCNC: 123 MG/DL
POTASSIUM SERPL-SCNC: 4.3 MMOL/L (ref 3.4–5.3)
PROT SERPL-MCNC: 7.8 G/DL (ref 6.4–8.3)
SODIUM SERPL-SCNC: 135 MMOL/L (ref 135–145)
TRIGL SERPL-MCNC: 113 MG/DL

## 2024-08-05 PROCEDURE — 99214 OFFICE O/P EST MOD 30 MIN: CPT | Performed by: PHYSICIAN ASSISTANT

## 2024-08-05 PROCEDURE — 36415 COLL VENOUS BLD VENIPUNCTURE: CPT | Performed by: PHYSICIAN ASSISTANT

## 2024-08-05 PROCEDURE — 80053 COMPREHEN METABOLIC PANEL: CPT | Performed by: PHYSICIAN ASSISTANT

## 2024-08-05 PROCEDURE — 80061 LIPID PANEL: CPT | Performed by: PHYSICIAN ASSISTANT

## 2024-08-05 RX ORDER — CITALOPRAM HYDROBROMIDE 20 MG/1
20 TABLET ORAL DAILY
Qty: 90 TABLET | Refills: 3 | Status: SHIPPED | OUTPATIENT
Start: 2024-08-05

## 2024-08-05 RX ORDER — LISINOPRIL 5 MG/1
5 TABLET ORAL DAILY
Qty: 90 TABLET | Refills: 3 | Status: SHIPPED | OUTPATIENT
Start: 2024-08-05

## 2024-08-05 RX ORDER — ATORVASTATIN CALCIUM 10 MG/1
10 TABLET, FILM COATED ORAL DAILY
Qty: 90 TABLET | Refills: 3 | Status: SHIPPED | OUTPATIENT
Start: 2024-08-05

## 2024-08-05 ASSESSMENT — PAIN SCALES - GENERAL: PAINLEVEL: NO PAIN (0)

## 2024-08-05 NOTE — PROGRESS NOTES
"  Assessment & Plan     Type 1 diabetes mellitus without complication (H)  Insulin managed by Endocrinology   Continue with the atorvastatin and lisinopril  - atorvastatin (LIPITOR) 10 MG tablet; Take 1 tablet (10 mg) by mouth daily  - lisinopril (ZESTRIL) 5 MG tablet; Take 1 tablet (5 mg) by mouth daily  - Lipid panel reflex to direct LDL Fasting; Future  - Comprehensive metabolic panel (BMP + Alb, Alk Phos, ALT, AST, Total. Bili, TP); Future  - Lipid panel reflex to direct LDL Fasting  - Comprehensive metabolic panel (BMP + Alb, Alk Phos, ALT, AST, Total. Bili, TP)    MONTSERRAT (generalized anxiety disorder)  Well controlled with the citalopram  - citalopram (CELEXA) 20 MG tablet; Take 1 tablet (20 mg) by mouth daily    Mild major depression (H24)  Well controlled with citalopram.   - citalopram (CELEXA) 20 MG tablet; Take 1 tablet (20 mg) by mouth daily      Plan to combine routine physical exam and medication check next year.   She is up to date on preventative screening.     BMI  Estimated body mass index is 37.87 kg/m  as calculated from the following:    Height as of this encounter: 1.695 m (5' 6.75\").    Weight as of this encounter: 108.9 kg (240 lb).   Weight management plan: Discussed healthy diet and exercise guidelines          Tomi Briggs is a 48 year old, presenting for the following health issues:  Hypertension        8/5/2024     7:54 AM   Additional Questions   Roomed by Roge GARCIA MA     History of Present Illness       Hypertension: She presents for follow up of hypertension.  She does not check blood pressure  regularly outside of the clinic. Outpatient blood pressures have not been over 140/90. She follows a low salt diet.     Reason for visit:  Med check    She eats 2-3 servings of fruits and vegetables daily.She consumes 0 sweetened beverage(s) daily.She exercises with enough effort to increase her heart rate 30 to 60 minutes per day.  She exercises with enough effort to increase her heart rate " "5 days per week.   She is taking medications regularly.     Brigitte is here today for annual medication check.   She has been doing well. She continues to see Endocrinology for management of her insulin pump.     Type 1 Diabetes: managed with insulin, but also taking lipitor 10 mg and lisinopril 5 mg.   2.    Anxiety and Depression: managed with citalopram 20 mg and doing well.                 Review of Systems  Constitutional, HEENT, cardiovascular, pulmonary, GI, , musculoskeletal, neuro, skin, endocrine and psych systems are negative, except as otherwise noted.      Objective    /72   Pulse 77   Temp 96.9  F (36.1  C) (Tympanic)   Ht 1.695 m (5' 6.75\")   Wt 108.9 kg (240 lb)   LMP 09/22/2021 (Approximate)   SpO2 98%   Breastfeeding No   BMI 37.87 kg/m    Body mass index is 37.87 kg/m .  Physical Exam   GENERAL: alert and no distress  RESP: lungs clear to auscultation - no rales, rhonchi or wheezes  CV: regular rate and rhythm, normal S1 S2, no S3 or S4, no murmur, click or rub, no peripheral edema  MS: no gross musculoskeletal defects noted, no edema  SKIN: no suspicious lesions or rashes  NEURO: Normal strength and tone, mentation intact and speech normal  PSYCH: mentation appears normal, affect normal/bright    Office Visit on 04/29/2024   Component Date Value Ref Range Status    Afinion Hemoglobin A1c POCT 04/29/2024 7.6 (H)  <=5.7 % Final    Normal <5.7%   Prediabetes 5.7-6.4%     Diabetes 6.5% or higher       Note: Adopted from ADA consensus guidelines.           Signed Electronically by: Kristen M. Kehr, PA-C    "

## 2024-08-06 DIAGNOSIS — E66.01 CLASS 2 SEVERE OBESITY WITH SERIOUS COMORBIDITY AND BODY MASS INDEX (BMI) OF 36.0 TO 36.9 IN ADULT, UNSPECIFIED OBESITY TYPE (H): Primary | ICD-10-CM

## 2024-08-06 DIAGNOSIS — E66.812 CLASS 2 SEVERE OBESITY WITH SERIOUS COMORBIDITY AND BODY MASS INDEX (BMI) OF 36.0 TO 36.9 IN ADULT, UNSPECIFIED OBESITY TYPE (H): Primary | ICD-10-CM

## 2024-08-12 ENCOUNTER — OFFICE VISIT (OUTPATIENT)
Dept: PLASTIC SURGERY | Facility: CLINIC | Age: 49
End: 2024-08-12
Payer: COMMERCIAL

## 2024-08-12 DIAGNOSIS — J34.2 DEVIATED NASAL SEPTUM: ICD-10-CM

## 2024-08-12 DIAGNOSIS — J34.89 NASAL OBSTRUCTION: Primary | ICD-10-CM

## 2024-08-12 DIAGNOSIS — S02.2XXS CLOSED FRACTURE OF NASAL BONE, SEQUELA: ICD-10-CM

## 2024-08-12 DIAGNOSIS — J34.829 NASAL VALVE COLLAPSE: ICD-10-CM

## 2024-08-12 DIAGNOSIS — J34.3 NASAL TURBINATE HYPERTROPHY: ICD-10-CM

## 2024-08-12 DIAGNOSIS — R09.81 NASAL CONGESTION: ICD-10-CM

## 2024-08-12 DIAGNOSIS — J34.89 NASAL VALVE STENOSIS: ICD-10-CM

## 2024-08-12 NOTE — PROGRESS NOTES
Facial Plastic and Reconstructive Surgery Consultation        HPI:   I had the pleasure of seeing Brigitte Robles today in clinic for consultation for nasal obstruction. Brigitte Robles is a 48 year old female.  She reports longstanding issues with breathing through her nose.  She has a history of 2 prior significant nasal traumas where she has broken her nose.  Since that time, she has difficulty breathing through the right greater than left side of her nose.  After the most recent break she noticed significant changes to the appearance of her nose.  She reports she has type 1 diabetes and used Flonase nasal spray for a month but this made her sugars go too high and therefore she had to stop.  It did not help when she was on it.  She does not get regular epistaxis.  She does have some seasonal allergies but even those are well-controlled she has difficulties with breathing through her nose.  She never had surgery on her nose before.        Review Of Systems  ROS: 10 point ROS neg other than the symptoms noted above in the HPI.    Patient Active Problem List   Diagnosis    Depressive disorder, not elsewhere classified    Hypothyroidism    GLUCOCORTICOID DEFICIENT    Anxiety    CARDIOVASCULAR SCREENING; LDL GOAL LESS THAN 160    Seasonal allergic rhinitis    Type 1 diabetes mellitus without complication (H)    Morbid obesity (H)    MONTSERRAT (generalized anxiety disorder)    Mild major depression (H24)    Metrorrhagia     Past Surgical History:   Procedure Laterality Date    ABDOMEN SURGERY  11.12.2021    Hysterectomy    COLONOSCOPY WITH CO2 INSUFFLATION N/A 7/18/2023    Procedure: Colonoscopy with CO2 insufflation;  Surgeon: Flavio Small DO;  Location:  OR     REMOVAL OF TONSILS,12+ Y/O       Current Outpatient Medications   Medication Sig Dispense Refill    atorvastatin (LIPITOR) 10 MG tablet Take 1 tablet (10 mg) by mouth daily 90 tablet 3    blood glucose monitoring (ONETOUCH VERIO IQ) test strip Use  to test blood sugar 10 times daily or as directed. 900 strip 3    citalopram (CELEXA) 20 MG tablet Take 1 tablet (20 mg) by mouth daily 90 tablet 3    Continuous Glucose Sensor (DEXCOM G7 SENSOR) MISC Change every 10 days. Please dispense G7 sensor with underline below LBL code on the side of box. This is needed for compatibility with insulin pump. 9 each 3    cyclobenzaprine (FLEXERIL) 10 MG tablet Take 1 tablet (10 mg) by mouth 3 times daily as needed for muscle spasms 30 tablet 0    Glucagon (BAQSIMI) 3 MG/DOSE nasal powder Spray 1 spray (3 mg) in nostril as needed (for hypoglycemia episode) in the event of unconscious hypoglycemia or hypoglycemic seizure. May repeat dose if no response after 15 minutes. 1 each 1    insulin aspart (NOVOLOG VIAL) 100 UNITS/ML vial USE AS DIRECTED VIA INSULIN PUMP. TOTAL DAILY DOSE APPROX 80 UNITS 80 mL 3    insulin degludec (TRESIBA FLEXTOUCH) 200 UNIT/ML pen INJECT 30 UNITS SUBCUTANEOUS DAILY 15 mL 3    insulin pen needle (B-D U/F) 31G X 5 MM miscellaneous USE 4 DAILY OR AS DIRECTED. 400 each 3    INSULIN PUMP - OUTPATIENT Date Last Updated: 1/25/2021  [employer]  Pump Website Username: [email or web address]  Pump Website Password: [email or web address]      lisinopril (ZESTRIL) 5 MG tablet Take 1 tablet (5 mg) by mouth daily 90 tablet 3    ONETOUCH DELICA LANCETS 33G MISC 1 Box 6 times daily 100 each 12    semaglutide (OZEMPIC) 2 MG/3ML pen -Start Ozempic 0.25 mg weekly for 1 month then increase to 0.5 mg weekly for 1 month and then increase to 1.0 mg weekly thereafter 6 mL 0    SYNTHROID 125 MCG tablet TAKE 1 TABLET DAILY FOR 6 DAYS 84 tablet 2     Levothyroxine  Social History     Socioeconomic History    Marital status:      Spouse name: Dougie    Number of children: 1    Years of education: 18    Highest education level: Not on file   Occupational History    Occupation: MLT     Comment: [employer]   Tobacco Use    Smoking status: Never     Passive exposure:  Past    Smokeless tobacco: Never   Vaping Use    Vaping status: Never Used   Substance and Sexual Activity    Alcohol use: Yes     Comment: rare    Drug use: No    Sexual activity: Yes     Partners: Male     Birth control/protection: Male Surgical, Female Surgical     Comment:  had vascetomy 9 yrs ago   Other Topics Concern    Parent/sibling w/ CABG, MI or angioplasty before 65F 55M? No     Service No    Blood Transfusions No    Caffeine Concern No    Occupational Exposure Yes    Hobby Hazards No    Sleep Concern No    Stress Concern No    Weight Concern No    Special Diet No    Back Care No    Exercise Yes     Comment: Cardio, walking    Bike Helmet No    Seat Belt Yes    Self-Exams No     Comment: Every three months   Social History Narrative    Not on file     Social Determinants of Health     Financial Resource Strain: Not on file   Food Insecurity: Not on file   Transportation Needs: Not on file   Physical Activity: Not on file   Stress: Not on file   Social Connections: Not on file   Interpersonal Safety: Low Risk  (8/5/2024)    Interpersonal Safety     Do you feel physically and emotionally safe where you currently live?: Yes     Within the past 12 months, have you been hit, slapped, kicked or otherwise physically hurt by someone?: No     Within the past 12 months, have you been humiliated or emotionally abused in other ways by your partner or ex-partner?: No   Housing Stability: Not on file     Family History   Problem Relation Age of Onset    Diabetes Mother     Hypertension Mother     Thyroid Disease Mother     Diabetes Father         1    Thyroid Disease Father         BORIS'S    Diabetes Sister     Thyroid Disease Sister     Heart Disease Maternal Grandmother         TRIPLE BYPASS    Thyroid Disease Maternal Grandmother     Diabetes Maternal Grandmother         2    Heart Disease Maternal Grandfather         TRIPLE BYPASS    Coronary Artery Disease Maternal Grandfather     Thyroid  Disease Maternal Grandfather     Thyroid Disease Brother     Diabetes Paternal Aunt     Diabetes Paternal Uncle     Diabetes Brother         1    Thyroid Disease Brother     Diabetes Nephew         1    Depression Sister     Thyroid Disease Sister     Diabetes Sister         1    Breast Cancer No family hx of     Cancer - colorectal No family hx of        PE:  Alert and Oriented, Answering Questions Appropriately  Atraumatic, Normocephalic, Face Symmetric  Skin: [place] 2  Facial Nerve Intact and facial movement symmetric  EOMI  Nasal Exam: Her nasal bones are deviated and her dorsal septum is deviated off to the left causing her tip to also be deviated to the left.  She has significant narrowing of her right greater than left internal nasal valve.  She has significant improvement in breathing with the modified Dia maneuver on the right greater than left side.  Anterior rhinoscopy reveals a pink and healthy mucosa.  She is a caudal septal deviation to the left.  She has an obvious previous fracture to her septum with a buckling.  She has bilateral septal deviations.  She has inferior turbinate hypertrophy on both sides.  Chin: Normal   Lips/Teeth/Toungue/Gums: Lips intact  Neck: Trachea midline  Chest: No wheezing, cyanosis, or stridor  Card: not diaphoretic  Neuro/Psych: CN's 2-12 intact, Moves all extremities, ambulation in intact, positive affect, no notable muscle weakness              IMPRESSION/PLAN: Brigitte Robles is a 48 year old female presenting for evaluation of longstanding nasal obstruction in the setting of 2 prior significant nasal traumas.  On examination, she has nasal bone deviation, dorsal septal and caudal deviation, internal nasal valve narrowing, and inferior turban hypertrophy.  Septoplasty alone would not improve her breathing.  She has tried Flonase in the past with no significant improvement in her breathing.  She has never had surgery on her nose before.  In order to improve her  breathing she would need an open septorhinoplasty with osteotomies,  grafts, nasal septal reconstruction with repositioning, caudal septal extension graft, and inferior turbinate reduction.  She does have an insulin pump and we would have to make sure that that can stay on during her procedure.Risks and benefits were discussed including but not limited to bleeding, infection, asymmetry, persistent or worsened breathing, septal perforation, numbness/tingling, need for additional procedures.       Photodocumentation was obtained.

## 2024-08-13 ENCOUNTER — PREP FOR PROCEDURE (OUTPATIENT)
Dept: OTOLARYNGOLOGY | Facility: CLINIC | Age: 49
End: 2024-08-13
Payer: COMMERCIAL

## 2024-08-13 DIAGNOSIS — J34.3 HYPERTROPHY OF INFERIOR NASAL TURBINATE: ICD-10-CM

## 2024-08-13 DIAGNOSIS — M95.0 NASAL DEFORMITY, ACQUIRED: ICD-10-CM

## 2024-08-13 DIAGNOSIS — J34.89 NASAL VALVE STENOSIS: ICD-10-CM

## 2024-08-13 DIAGNOSIS — S02.2XXA NASAL FRACTURE: Primary | ICD-10-CM

## 2024-08-13 DIAGNOSIS — J34.2 NASAL SEPTAL DEVIATION: ICD-10-CM

## 2024-08-13 RX ORDER — CEFAZOLIN SODIUM 2 G/50ML
2 SOLUTION INTRAVENOUS
OUTPATIENT
Start: 2024-08-13

## 2024-08-13 RX ORDER — CEFAZOLIN SODIUM 2 G/50ML
2 SOLUTION INTRAVENOUS SEE ADMIN INSTRUCTIONS
OUTPATIENT
Start: 2024-08-13

## 2024-08-13 NOTE — NURSING NOTE
Photo documentation obtained.    Consent obtained for nasal endoscopy.    Will work to obtain PA for functional nasal surgery.     Pratibha Uribe RN  08/13/24 11:27 AM

## 2024-08-22 ENCOUNTER — TELEPHONE (OUTPATIENT)
Dept: PLASTIC SURGERY | Facility: CLINIC | Age: 49
End: 2024-08-22

## 2024-08-22 NOTE — TELEPHONE ENCOUNTER
Called patient to inform her that her surgery has been approved.     Left voicemail.     Zena Junior, Surgical Coordinator  8/22/2024

## 2024-09-09 ENCOUNTER — OFFICE VISIT (OUTPATIENT)
Dept: FAMILY MEDICINE | Facility: CLINIC | Age: 49
End: 2024-09-09
Payer: COMMERCIAL

## 2024-09-09 VITALS
BODY MASS INDEX: 37.2 KG/M2 | DIASTOLIC BLOOD PRESSURE: 76 MMHG | RESPIRATION RATE: 16 BRPM | SYSTOLIC BLOOD PRESSURE: 122 MMHG | WEIGHT: 237 LBS | OXYGEN SATURATION: 95 % | HEART RATE: 99 BPM | HEIGHT: 67 IN | TEMPERATURE: 97.8 F

## 2024-09-09 DIAGNOSIS — Z01.818 PREOP GENERAL PHYSICAL EXAM: Primary | ICD-10-CM

## 2024-09-09 DIAGNOSIS — F41.1 GAD (GENERALIZED ANXIETY DISORDER): ICD-10-CM

## 2024-09-09 DIAGNOSIS — F32.0 MILD MAJOR DEPRESSION (H): ICD-10-CM

## 2024-09-09 DIAGNOSIS — J34.2 DEVIATED NASAL SEPTUM: ICD-10-CM

## 2024-09-09 DIAGNOSIS — E06.3 HYPOTHYROIDISM DUE TO HASHIMOTO'S THYROIDITIS: ICD-10-CM

## 2024-09-09 DIAGNOSIS — E10.9 TYPE 1 DIABETES MELLITUS WITHOUT COMPLICATION (H): ICD-10-CM

## 2024-09-09 LAB
HBA1C MFR BLD: 7.3 % (ref 0–5.6)
HGB BLD-MCNC: 12.8 G/DL (ref 11.7–15.7)

## 2024-09-09 PROCEDURE — 85018 HEMOGLOBIN: CPT | Performed by: PHYSICIAN ASSISTANT

## 2024-09-09 PROCEDURE — 90656 IIV3 VACC NO PRSV 0.5 ML IM: CPT | Performed by: PHYSICIAN ASSISTANT

## 2024-09-09 PROCEDURE — 84443 ASSAY THYROID STIM HORMONE: CPT | Performed by: PHYSICIAN ASSISTANT

## 2024-09-09 PROCEDURE — 83036 HEMOGLOBIN GLYCOSYLATED A1C: CPT | Performed by: PHYSICIAN ASSISTANT

## 2024-09-09 PROCEDURE — 36415 COLL VENOUS BLD VENIPUNCTURE: CPT | Performed by: PHYSICIAN ASSISTANT

## 2024-09-09 PROCEDURE — 84132 ASSAY OF SERUM POTASSIUM: CPT | Performed by: PHYSICIAN ASSISTANT

## 2024-09-09 PROCEDURE — 90471 IMMUNIZATION ADMIN: CPT | Performed by: PHYSICIAN ASSISTANT

## 2024-09-09 PROCEDURE — 99214 OFFICE O/P EST MOD 30 MIN: CPT | Mod: 25 | Performed by: PHYSICIAN ASSISTANT

## 2024-09-09 ASSESSMENT — PAIN SCALES - GENERAL: PAINLEVEL: NO PAIN (0)

## 2024-09-09 NOTE — PATIENT INSTRUCTIONS
Make adjustments with the following medications:  Hold Ozempic at least 7 days prior to surgery  Discuss insulin with your Endocrinology provider  Hold ibuprofen /aleve at least 7 days prior  Stop supplements / vitamins up to 2 weeks prior to surgery  Take all other medications as scheduled

## 2024-09-09 NOTE — PROGRESS NOTES
Preoperative Evaluation  [facility]  [address] [facility] [address]  Phone: [phone]  Primary Provider: Kristen M. Kehr, PA-C  Pre-op Performing Provider: Kristen M. Kehr, PA-C  Sep 9, 2024             9/9/2024   Surgical Information   What procedure is being done? nose fix   Facility or Hospital where procedure/surgery will be performed: [facility]   Who is doing the procedure / surgery? opal roman   Date of surgery / procedure: oct 7   Time of surgery / procedure: 1130   Where do you plan to recover after surgery? at home with family        Fax number for surgical facility: [phone]    Assessment & Plan     The proposed surgical procedure is considered LOW risk.    Preop general physical exam  Deviated nasal septum  Make adjustments with the following medications:  Hold Ozempic at least 7 days prior to surgery  Discuss insulin with your Endocrinology provider  Hold ibuprofen /aleve at least 7 days prior  Stop supplements / vitamins up to 2 weeks prior to surgery  Take all other medications as scheduled  - Hemoglobin; Future  - Potassium; Future  - Hemoglobin A1c; Future  - TSH with free T4 reflex; Future  - Hemoglobin  - Potassium  - Hemoglobin A1c  - TSH with free T4 reflex    Type 1 diabetes mellitus without complication (H)  Stable  She will be discussing insulin adjustment with her Endocrinology provider prior to surgery  - Hemoglobin A1c; Future    MONTSERRAT (generalized anxiety disorder)  Mild major depression (H24)  Stable  Continue medication as prescribed    Hypothyroidism due to Hashimoto's thyroiditis  Stable  Continue medication as prescribed  - TSH with free T4 reflex; Future    Obesity  Hold ozempic 1 week prior to surgery         Risks and Recommendations  The patient has the following additional risks and recommendations for perioperative complications:  Diabetes:  - Patient is on insulin therapy; diabetic NPO guidelines  provided and discussed.  She will be reaching out to her Endocrinology provider about insulin management recommendations prior to her procedure.   She will hold the ozempic at least 1 week prior to surgery           Recommendation  See above. She will be working with Endocrinology regarding insulin dosage    Subjective   Brigitte is a 48 year old, presenting for the following:  No chief complaint on file.          9/9/2024     1:04 PM   Additional Questions   Roomed by Roge GARCIA MA         9/9/2024   Forms   Any forms needing to be completed Yes        HPI related to upcoming procedure: Brigitte will be having surgery for deviated nasal septum.         9/9/2024   Pre-Op Questionnaire   Have you ever had a heart attack or stroke? No   Have you ever had surgery on your heart or blood vessels, such as a stent placement, a coronary artery bypass, or surgery on an artery in your head, neck, heart, or legs? No   Do you have chest pain with activity? No   Do you have a history of heart failure? No   Do you currently have a cold, bronchitis or symptoms of other infection? No   Do you have a cough, shortness of breath, or wheezing? No   Do you or anyone in your family have previous history of blood clots? No   Do you or does anyone in your family have a serious bleeding problem such as prolonged bleeding following surgeries or cuts? No   Have you ever had problems with anemia or been told to take iron pills? No   Have you had any abnormal blood loss such as black, tarry or bloody stools, or abnormal vaginal bleeding? No   Have you ever had a blood transfusion? No   Are you willing to have a blood transfusion if it is medically needed before, during, or after your surgery? Yes   Have you or any of your relatives ever had problems with anesthesia? No   Do you have sleep apnea, excessive snoring or daytime drowsiness? No   Do you have any artifical heart valves or other implanted medical devices like a pacemaker, defibrillator, or  continuous glucose monitor? No   Do you have artificial joints? No   Are you allergic to latex? No        Health Care Directive  Patient does not have a Health Care Directive or Living Will: Discussed advance care planning with patient; information given to patient to review.    Preoperative Review of    reviewed - no record of controlled substances prescribed.      Status of Chronic Conditions:  DEPRESSION - Patient has a long history of Depression of moderate severity requiring medication for control with recent symptoms being stable..Current symptoms of depression include none.     DIABETES - Patient has a longstanding history of DiabetesType Type I . Patient is being treated with insulin pump and denies significant side effects. Control has been good. Complicating factors include but are not limited to: morbid obesity .     HYPOTHYROIDISM - Patient has a longstanding history of chronic Hypothyroidism. Patient has been doing well, noting no tremor, insomnia, hair loss or changes in skin texture. Continues to take medications as directed, without adverse reactions or side effects. Last TSH   Lab Results   Component Value Date    TSH 2.32 10/09/2023   .      Patient Active Problem List    Diagnosis Date Noted    MONTSERRAT (generalized anxiety disorder) 09/22/2021     Priority: Medium    Mild major depression (H24) 09/22/2021     Priority: Medium    Metrorrhagia 09/22/2021     Priority: Medium    Morbid obesity (H) 09/21/2021     Priority: Medium    Type 1 diabetes mellitus without complication (H) 02/17/2016     Priority: Medium    Seasonal allergic rhinitis 05/21/2012     Priority: Medium    CARDIOVASCULAR SCREENING; LDL GOAL LESS THAN 160 10/31/2010     Priority: Medium    Anxiety      Priority: Medium    GLUCOCORTICOID DEFICIENT 08/12/2009     Priority: Medium    Hypothyroidism 06/17/2003     Priority: Medium     Problem list name updated by automated process. Provider to review      Depressive disorder, not  elsewhere classified 02/08/2003     Priority: Medium      Past Medical History:   Diagnosis Date    Anxiety     DEPRESSIVE DISORDER NEC 2/8/2003    Type 1 diabetes mellitus without complication (H) 2/17/2016    Unspecified hypothyroidism 6/2003     Past Surgical History:   Procedure Laterality Date    ABDOMEN SURGERY  11.12.2021    Hysterectomy    COLONOSCOPY WITH CO2 INSUFFLATION N/A 7/18/2023    Procedure: Colonoscopy with CO2 insufflation;  Surgeon: Flavio Small DO;  Location: [facility] OR    HC REMOVAL OF TONSILS,13+ Y/O       Current Outpatient Medications   Medication Sig Dispense Refill    atorvastatin (LIPITOR) 10 MG tablet Take 1 tablet (10 mg) by mouth daily 90 tablet 3    blood glucose monitoring (ONETOUCH VERIO IQ) test strip Use to test blood sugar 10 times daily or as directed. 900 strip 3    citalopram (CELEXA) 20 MG tablet Take 1 tablet (20 mg) by mouth daily 90 tablet 3    Continuous Glucose Sensor (DEXCOM G7 SENSOR) MISC Change every 10 days. Please dispense G7 sensor with underline below LBL code on the side of box. This is needed for compatibility with insulin pump. 9 each 3    cyclobenzaprine (FLEXERIL) 10 MG tablet Take 1 tablet (10 mg) by mouth 3 times daily as needed for muscle spasms 30 tablet 0    Glucagon (BAQSIMI) 3 MG/DOSE nasal powder Spray 1 spray (3 mg) in nostril as needed (for hypoglycemia episode) in the event of unconscious hypoglycemia or hypoglycemic seizure. May repeat dose if no response after 15 minutes. 1 each 1    insulin aspart (NOVOLOG VIAL) 100 UNITS/ML vial USE AS DIRECTED VIA INSULIN PUMP. TOTAL DAILY DOSE APPROX 80 UNITS 80 mL 3    insulin degludec (TRESIBA FLEXTOUCH) 200 UNIT/ML pen INJECT 30 UNITS SUBCUTANEOUS DAILY 15 mL 3    insulin pen needle (B-D U/F) 31G X 5 MM miscellaneous USE 4 DAILY OR AS DIRECTED. 400 each 3    INSULIN PUMP - OUTPATIENT Date Last Updated: 1/25/2021  Tandem  Pump Website Username: [email or web address]  Pump Website  "Password: [email or web address]!      lisinopril (ZESTRIL) 5 MG tablet Take 1 tablet (5 mg) by mouth daily 90 tablet 3    ONETOUCH DELICA LANCETS 33G MISC 1 Box 6 times daily 100 each 12    semaglutide (OZEMPIC) 2 MG/3ML pen -Start Ozempic 0.25 mg weekly for 1 month then increase to 0.5 mg weekly for 1 month and then increase to 1.0 mg weekly thereafter 6 mL 0    SYNTHROID 125 MCG tablet TAKE 1 TABLET DAILY FOR 6 DAYS 84 tablet 2       Allergies   Allergen Reactions    Levothyroxine      Dizzy to generic --- not to synthroid        Social History     Tobacco Use    Smoking status: Never     Passive exposure: Past    Smokeless tobacco: Never   Substance Use Topics    Alcohol use: Yes     Comment: rare     Family History   Problem Relation Age of Onset    Diabetes Mother     Hypertension Mother     Thyroid Disease Mother     Diabetes Father         1    Thyroid Disease Father         BORIS'S    Diabetes Sister     Thyroid Disease Sister     Heart Disease Maternal Grandmother         TRIPLE BYPASS    Thyroid Disease Maternal Grandmother     Diabetes Maternal Grandmother         2    Heart Disease Maternal Grandfather         TRIPLE BYPASS    Coronary Artery Disease Maternal Grandfather     Thyroid Disease Maternal Grandfather     Thyroid Disease Brother     Diabetes Paternal Aunt     Diabetes Paternal Uncle     Diabetes Brother         1    Thyroid Disease Brother     Diabetes Nephew         1    Depression Sister     Thyroid Disease Sister     Diabetes Sister         1    Breast Cancer No family hx of     Cancer - colorectal No family hx of      History   Drug Use No             Review of Systems  Constitutional, HEENT, cardiovascular, pulmonary, GI, , musculoskeletal, neuro, skin, endocrine and psych systems are negative, except as otherwise noted.    Objective    /76   Pulse 99   Temp 97.8  F (36.6  C) (Tympanic)   Resp 16   Ht 1.689 m (5' 6.5\")   Wt 107.5 kg (237 lb)   LMP 09/22/2021 (Approximate)   SpO2 " "95%   Breastfeeding No   BMI 37.68 kg/m     Estimated body mass index is 37.68 kg/m  as calculated from the following:    Height as of this encounter: 1.689 m (5' 6.5\").    Weight as of this encounter: 107.5 kg (237 lb).  Physical Exam  GENERAL: alert and no distress  EYES: Eyes grossly normal to inspection, PERRL and conjunctivae and sclerae normal  HENT: ear canals and TM's normal, nose and mouth without ulcers or lesions  NECK: no adenopathy, no asymmetry, masses, or scars  RESP: lungs clear to auscultation - no rales, rhonchi or wheezes  CV: regular rate and rhythm, normal S1 S2, no S3 or S4, no murmur, click or rub, no peripheral edema  ABDOMEN: soft, nontender, no hepatosplenomegaly, no masses and bowel sounds normal  MS: no gross musculoskeletal defects noted, no edema  SKIN: no suspicious lesions or rashes  NEURO: Normal strength and tone, mentation intact and speech normal  PSYCH: mentation appears normal, affect normal/bright    Recent Labs   Lab Test 08/05/24  0852 04/29/24  1209 10/09/23  1300     --   --    POTASSIUM 4.3  --   --    CR 0.94  --  0.86   A1C  --  7.6*  --         Diagnostics  Recent Results (from the past 24 hour(s))   Hemoglobin    Collection Time: 09/09/24  1:52 PM   Result Value Ref Range    Hemoglobin 12.8 11.7 - 15.7 g/dL   Potassium    Collection Time: 09/09/24  1:52 PM   Result Value Ref Range    Potassium 4.3 3.4 - 5.3 mmol/L   Hemoglobin A1c    Collection Time: 09/09/24  1:52 PM   Result Value Ref Range    Hemoglobin A1C 7.3 (H) 0.0 - 5.6 %   TSH with free T4 reflex    Collection Time: 09/09/24  1:52 PM   Result Value Ref Range    TSH 2.76 0.30 - 4.20 uIU/mL      No EKG required, no history of coronary heart disease, significant arrhythmia, peripheral arterial disease or other structural heart disease.    Revised Cardiac Risk Index (RCRI)  The patient has the following serious cardiovascular risks for perioperative complications:   - High risk surgery (>5% cardiac " complication risk) = 1 point     RCRI Interpretation: 1 point: Class II (low risk - 0.9% complication rate)         Signed Electronically by: Kristen M. Kehr, PA-C  A copy of this evaluation report is provided to the requesting physician.

## 2024-09-10 LAB
POTASSIUM SERPL-SCNC: 4.3 MMOL/L (ref 3.4–5.3)
TSH SERPL DL<=0.005 MIU/L-ACNC: 2.76 UIU/ML (ref 0.3–4.2)

## 2024-09-10 NOTE — PROGRESS NOTES
Preop Evaluation faxed to [facility] at [phone], Rightfax confirmed.  Cloe TADEO    [facility]

## 2024-09-15 DIAGNOSIS — E10.9 TYPE 1 DIABETES MELLITUS WITHOUT COMPLICATION (H): ICD-10-CM

## 2024-09-16 RX ORDER — INSULIN ASPART 100 [IU]/ML
INJECTION, SOLUTION INTRAVENOUS; SUBCUTANEOUS
Qty: 80 ML | Refills: 2 | Status: SHIPPED | OUTPATIENT
Start: 2024-09-16 | End: 2024-09-19

## 2024-09-16 NOTE — TELEPHONE ENCOUNTER
insulin aspart (NOVOLOG VIAL) 100 UNITS/ML vial 80 mL 3 10/9/2023     Last Office Visit: 4/29/24  Future Office visit:  11/25/24      Routing refill request to provider for review/approval because:  Insulin and insulin pump supplies - refilled per Endocrine clinic.    Hanny Patel, RN  P Central Nursing/Red Flag Triage & Med Refill Team

## 2024-09-19 ENCOUNTER — MYC REFILL (OUTPATIENT)
Dept: ENDOCRINOLOGY | Facility: CLINIC | Age: 49
End: 2024-09-19
Payer: COMMERCIAL

## 2024-09-19 DIAGNOSIS — E10.9 TYPE 1 DIABETES MELLITUS WITHOUT COMPLICATION (H): ICD-10-CM

## 2024-09-20 RX ORDER — INSULIN ASPART 100 [IU]/ML
INJECTION, SOLUTION INTRAVENOUS; SUBCUTANEOUS
Qty: 80 ML | Refills: 2 | Status: SHIPPED | OUTPATIENT
Start: 2024-09-20

## 2024-10-02 ENCOUNTER — VIRTUAL VISIT (OUTPATIENT)
Dept: PHARMACY | Facility: CLINIC | Age: 49
End: 2024-10-02
Attending: INTERNAL MEDICINE
Payer: COMMERCIAL

## 2024-10-02 DIAGNOSIS — E03.9 HYPOTHYROIDISM, UNSPECIFIED TYPE: ICD-10-CM

## 2024-10-02 DIAGNOSIS — F32.0 MILD MAJOR DEPRESSION (H): ICD-10-CM

## 2024-10-02 DIAGNOSIS — E10.9 TYPE 1 DIABETES MELLITUS WITHOUT COMPLICATION (H): ICD-10-CM

## 2024-10-02 DIAGNOSIS — E78.5 HYPERLIPIDEMIA LDL GOAL <70: ICD-10-CM

## 2024-10-02 DIAGNOSIS — E66.01 MORBID OBESITY (H): Primary | ICD-10-CM

## 2024-10-02 DIAGNOSIS — I10 HYPERTENSION, UNSPECIFIED TYPE: ICD-10-CM

## 2024-10-02 NOTE — PROGRESS NOTES
Medication Therapy Management (MTM) Encounter    ASSESSMENT:                            Medication Adherence/Access: No issues identified    Diabetes/Weight Management: Patient has been tolerating Ozempic and has been able to use less insulin thus plan in place for patient to increase Ozempic dose after finishing current pen for additional benefits of weight loss and to reduce overall insulin requirements. Walked through steps with patient to sync pump with tandem blake on phone in order to see pump data, patient was able to sync data but no data was found on clinic portal thus encouraged patient to continue to use blake in order to access data at next follow up visit. Per CGM report, patient's blood sugars are below time in target range goal of >70% and patient has had very infrequent events of hypoglycemia since starting Ozempic thus recommended patient continue on current pump settings but educated to reach out if patient's blood sugars start to go low with upcoming Ozempic dose increase.     Hyperlipidemia: Patient's most recent LDL is above goal of <70, suspect that cholesterol will improve with weight loss after starting Ozempic, if LDL remains above goal at next check, could consider dose increase of atorvastatin.      Hypertension: Stable, blood pressures have been within normal range, patient using low dose lisinopril mostly for kidney protection.      Hypothyroidism: Stable, most recent TSH has been within normal limits.      Depression: Stable per patient reports.     PLAN:                            Continue Ozempic at 0.5mg for 3 more weeks from current pen then increase to Ozempic 1mg weekly     Continue on current pump settings, if you start to have frequent low blood sugars then reach out to assist with adjustments.     Follow-up: 11/12/24 at 9AM    Endocrine Team & Next Follow-Up:  11/12/24 with Donna  11/25/24 with Dr. Ledesma    SUBJECTIVE/OBJECTIVE:                          Brigitte Robles is  a 49 year old female called for an initial visit. She was referred to me from Dr. Ledesma.      Reason for visit: Ozempic follow up.    Allergies/ADRs: Reviewed in chart  Past Medical History: Reviewed in chart  Tobacco: She reports that she has never smoked. She has been exposed to tobacco smoke. She has never used smokeless tobacco.  Alcohol: 1 beverage per month     Medication Adherence/Access: no issues reported    Diabetes /Weight Management:   Patient diagnosed with type 1 diabetes, previously treated for type 2 after diagnostic workup was complete.   Current Medications:  Ozempic 0.5mg weekly on Wednesdays- patient recently re-started taking again and patient reports that they have completed two doses at 0.25mg and four at 0.5mg thus far. Patient notes that they have had a little nausea and constipation but notes that symptoms are overall tolerable. Notes that they were experiencing constipation prior to Ozempic but symptoms have not worsened since starting Ozempic. Patient reports that their insulin use has decreased since re-starting Ozempic. Notes that she was previously using 100 units/day prior to starting Ozempic and now has been using less and up to about 60 units/day.   Patient notes that they are using Tslim X2 insulin pump currently but warranty is ending in 30 days. Patient notes that they will start process to start new pump with Tandem as recommended. Patient notes that they have not logged into [employer] mobile blake since April thus unable to see pump data.   Keeps Tresiba on hand as needed for vacation or pump failure     Medication history per chart review:  - metformin: ineffective  - Ozempic: patient had previous trial but did not tolerate due to nausea thus stopped taking, noted that they had weight loss benefits while taking and weight gain has occurred since stopping.   Blood sugar monitoring: Continuous Glucose Monitor:               Diet/Exercise: She has been doing [employer]  several times per week and walks 2 miles every day.      Hyperlipidemia   Current Medications:  atorvastatin 10mg daily  Patient declines side effects   Recent Labs   Lab Test 08/05/24  0852 10/09/23  1300   CHOL 187 173   HDL 64 66    83   TRIG 113 118      Hypertension   Current Medications:   Lisinopril 5mg daily   Patient reports no current medication side effects  Patient does not self-monitor blood pressure.    BP Readings from Last 3 Encounters:   09/09/24 122/76   08/05/24 110/72   04/29/24 (!) 138/91      Hypothyroidism   Current Medications:  Levothyroxine 125 mcg daily in the morning   Patient is having the following symptoms: none.   TSH   Date Value Ref Range Status   09/09/2024 2.76 0.30 - 4.20 uIU/mL Final   04/03/2023 0.09 (L) 0.40 - 4.00 mU/L Final   12/15/2020 2.07 0.40 - 4.00 mU/L Final      Depression:  Current Medications:  Citalopram 20mg daily - patient reports that this medication has been effective     Today's Vitals: LMP 09/22/2021 (Approximate)   ----------------  I spent 55 minutes with this patient today. All changes were made via collaborative practice agreement with Lloyd Ledesma. A copy of the visit note was provided to the patient's provider(s).    A summary of these recommendations was sent via [employer].    Donna Fernandes, PharmD  Endocrine & Diabetes Medication Therapy Management Pharmacist   [address] [place] [id number]     Telemedicine Visit Details  The patient's medications can be safely assessed via a telemedicine encounter.  Type of service:  Telephone visit  Originating Location (pt. Location): Home    Distant Location (provider location):  Off-site  Start Time:  9:02 AM  End Time: 9:57 AM     Medication Therapy Recommendations  Morbid obesity (H)    Current Medication: semaglutide (OZEMPIC) 2 MG/3ML pen (Discontinued)   Rationale: Dose too low - Dosage too low - Effectiveness   Recommendation: Increase Dose   Status: Accepted per CPA

## 2024-10-02 NOTE — PATIENT INSTRUCTIONS
"Recommendations from today's MTM visit:                                                      Continue Ozempic at 0.5mg for 3 more weeks from current pen then increase to Ozempic 1mg weekly     Continue on current pump settings, if you start to have frequent low blood sugars then reach out to assist with adjustments.     Follow-up: 11/12/24 at 9AM    Endocrine Team & Next Follow-Up:  11/12/24 with Donna  11/25/24 with Dr. Ledesma    It was great speaking with you today.  I value your experience and would be very thankful for your time in providing feedback in our clinic survey. In the next few days, you may receive an email or text message from [employer] with a link to a survey related to your  clinical pharmacist.\"     To schedule another MTM appointment, please call the clinic directly or you may call the MTM scheduling line at [phone].    My Clinical Pharmacist's contact information:                                                      Please feel free to contact me with any questions or concerns you have.      Donna Fernandes, PharmD  Endocrine & Diabetes Medication Therapy Management Pharmacist   [address] [place] [phone]    "

## 2024-10-07 ENCOUNTER — LAB REQUISITION (OUTPATIENT)
Dept: LAB | Facility: CLINIC | Age: 49
End: 2024-10-07
Payer: COMMERCIAL

## 2024-10-07 ENCOUNTER — TRANSFERRED RECORDS (OUTPATIENT)
Dept: HEALTH INFORMATION MANAGEMENT | Facility: CLINIC | Age: 49
End: 2024-10-07

## 2024-10-07 DIAGNOSIS — Z98.890 POSTOPERATIVE STATE: Primary | ICD-10-CM

## 2024-10-07 DIAGNOSIS — J34.2 NASAL SEPTAL DEVIATION: Primary | ICD-10-CM

## 2024-10-07 DIAGNOSIS — J34.89 OTHER SPECIFIED DISORDERS OF NOSE AND NASAL SINUSES: ICD-10-CM

## 2024-10-07 LAB
BACTERIA SPEC CULT: NORMAL
BACTERIA SPEC CULT: NORMAL
GRAM STAIN RESULT: NORMAL

## 2024-10-07 PROCEDURE — 88304 TISSUE EXAM BY PATHOLOGIST: CPT | Mod: 26 | Performed by: PATHOLOGY

## 2024-10-07 PROCEDURE — 87075 CULTR BACTERIA EXCEPT BLOOD: CPT | Mod: ORL | Performed by: STUDENT IN AN ORGANIZED HEALTH CARE EDUCATION/TRAINING PROGRAM

## 2024-10-07 PROCEDURE — 87205 SMEAR GRAM STAIN: CPT | Mod: ORL | Performed by: STUDENT IN AN ORGANIZED HEALTH CARE EDUCATION/TRAINING PROGRAM

## 2024-10-07 PROCEDURE — 87102 FUNGUS ISOLATION CULTURE: CPT | Mod: ORL | Performed by: STUDENT IN AN ORGANIZED HEALTH CARE EDUCATION/TRAINING PROGRAM

## 2024-10-07 PROCEDURE — 87077 CULTURE AEROBIC IDENTIFY: CPT | Mod: ORL | Performed by: STUDENT IN AN ORGANIZED HEALTH CARE EDUCATION/TRAINING PROGRAM

## 2024-10-07 PROCEDURE — 88304 TISSUE EXAM BY PATHOLOGIST: CPT | Mod: TC,ORL | Performed by: STUDENT IN AN ORGANIZED HEALTH CARE EDUCATION/TRAINING PROGRAM

## 2024-10-07 PROCEDURE — 88304 TISSUE EXAM BY PATHOLOGIST: CPT | Mod: TC,ORL

## 2024-10-07 PROCEDURE — 87206 SMEAR FLUORESCENT/ACID STAI: CPT | Mod: ORL | Performed by: STUDENT IN AN ORGANIZED HEALTH CARE EDUCATION/TRAINING PROGRAM

## 2024-10-07 RX ORDER — ONDANSETRON 4 MG/1
4 TABLET, ORALLY DISINTEGRATING ORAL EVERY 6 HOURS PRN
Qty: 12 TABLET | Refills: 0 | Status: SHIPPED | OUTPATIENT
Start: 2024-10-07

## 2024-10-07 RX ORDER — OXYCODONE HYDROCHLORIDE 5 MG/1
5 TABLET ORAL EVERY 6 HOURS PRN
Qty: 12 TABLET | Refills: 0 | Status: SHIPPED | OUTPATIENT
Start: 2024-10-07 | End: 2024-10-10

## 2024-10-08 ENCOUNTER — LAB REQUISITION (OUTPATIENT)
Dept: LAB | Facility: CLINIC | Age: 49
End: 2024-10-08
Payer: COMMERCIAL

## 2024-10-08 DIAGNOSIS — J34.89 OTHER SPECIFIED DISORDERS OF NOSE AND NASAL SINUSES: ICD-10-CM

## 2024-10-08 LAB
ACID FAST STAIN (ARUP): NORMAL
ACID FAST STAIN (ARUP): NORMAL

## 2024-10-09 LAB
BACTERIA TISS BX CULT: ABNORMAL
PATH REPORT.COMMENTS IMP SPEC: NORMAL
PATH REPORT.COMMENTS IMP SPEC: NORMAL
PATH REPORT.FINAL DX SPEC: NORMAL
PATH REPORT.GROSS SPEC: NORMAL
PATH REPORT.MICROSCOPIC SPEC OTHER STN: NORMAL
PATH REPORT.RELEVANT HX SPEC: NORMAL
PHOTO IMAGE: NORMAL

## 2024-10-10 LAB — BACTERIA TISS BX CULT: ABNORMAL

## 2024-10-11 ENCOUNTER — TELEPHONE (OUTPATIENT)
Dept: OTOLARYNGOLOGY | Facility: CLINIC | Age: 49
End: 2024-10-11
Payer: COMMERCIAL

## 2024-10-11 NOTE — TELEPHONE ENCOUNTER
Called pt to discuss path results that have come back so far. RN informed pt that she should  the antibiotics and start taking them as the growth in her nose is susceptible to Augmentin. Pt will come in on Monday to discuss plan going forward.     Pratibha Uribe RN  10/11/24 9:52 AM

## 2024-10-14 ENCOUNTER — ALLIED HEALTH/NURSE VISIT (OUTPATIENT)
Dept: PLASTIC SURGERY | Facility: CLINIC | Age: 49
End: 2024-10-14
Payer: COMMERCIAL

## 2024-10-14 DIAGNOSIS — Z98.890 POSTOPERATIVE STATE: Primary | ICD-10-CM

## 2024-10-14 LAB
BACTERIA TISS BX CULT: ABNORMAL
BACTERIA TISS BX CULT: ABNORMAL

## 2024-10-14 RX ORDER — MUPIROCIN 20 MG/G
OINTMENT TOPICAL 3 TIMES DAILY
Qty: 30 G | Refills: 0 | Status: SHIPPED | OUTPATIENT
Start: 2024-10-14 | End: 2024-10-28

## 2024-10-14 NOTE — PROGRESS NOTES
Pt came in POD#7 s/p aborted septorhinoplasty.     Pt came in today to talk about options going forward with Dr. Lu.     Pratibha Uribe RN  10/14/24 1:34 PM

## 2024-10-31 ENCOUNTER — TELEPHONE (OUTPATIENT)
Dept: ENDOCRINOLOGY | Facility: CLINIC | Age: 49
End: 2024-10-31
Payer: COMMERCIAL

## 2024-10-31 NOTE — TELEPHONE ENCOUNTER
PA Initiation    Medication: DEXCOM G6 SENSOR MISC  Insurance Company: [employer] Non-Specialty PA's - Phone [phone] Fax [phone]  Pharmacy Filling the Rx: [facility]/PHARMACY #[id number] - [place] - [address] [facility] AT [address] [facility]  Filling Pharmacy Phone:    Filling Pharmacy Fax:    Start Date: 10/31/2024    Key: [id number]  Pending PA Questions

## 2024-11-01 NOTE — TELEPHONE ENCOUNTER
Prior Authorization Approval    Medication: DEXCOM G6 SENSOR MISC  Authorization Effective Date: 11/1/2024  Authorization Expiration Date: 11/1/2025  Approved Dose/Quantity: 3 per 30 days  Reference #: Key: [id number]   Insurance Company: [employer] Non-Specialty PA's - Phone [phone] Fax [phone]  Expected CoPay: $    CoPay Card Available: No    Financial Assistance Needed: no  Which Pharmacy is filling the prescription: [facility]/PHARMACY #[id number] - [place]  [address] [facility] [address] [facility]  Pharmacy Notified: no  Patient Notified: no renewal

## 2024-11-04 LAB
BACTERIA TISS BX CULT: NO GROWTH
BACTERIA TISS BX CULT: NO GROWTH

## 2024-11-12 ENCOUNTER — VIRTUAL VISIT (OUTPATIENT)
Dept: PHARMACY | Facility: CLINIC | Age: 49
End: 2024-11-12
Attending: INTERNAL MEDICINE
Payer: COMMERCIAL

## 2024-11-12 DIAGNOSIS — E10.9 TYPE 1 DIABETES MELLITUS WITHOUT COMPLICATION (H): Primary | ICD-10-CM

## 2024-11-12 DIAGNOSIS — E66.01 MORBID OBESITY (H): ICD-10-CM

## 2024-11-12 NOTE — Clinical Note
KANWALI- we have been having troubles with getting patient's pump data via the portal, you may have to upload manually at upcoming visit, have been able to go off CGM data.   Donna

## 2024-11-12 NOTE — PATIENT INSTRUCTIONS
"Recommendations from today's MTM visit:                                                      With your schedule determine if you want to increase to the 2mg Ozempic dose starting 11/28 or 12/12. If starting 2mg on 12/12 then continue to use 1mg dose ~37 clicks from 2mg pen for 2 more weeks then change to 2mg dose on 12/12.    Continue on current pump settings, if you start to have frequent low blood sugars then reach out to assist with adjustments.     Follow-up: 12/24/2024 at 9AM    Endocrine Team & Next Follow-Up:  11/25/24 with Dr. Ledesma   12/24/24 with Donna    It was great speaking with you today.  I value your experience and would be very thankful for your time in providing feedback in our clinic survey. In the next few days, you may receive an email or text message from [employer] with a link to a survey related to your  clinical pharmacist.\"     To schedule another MTM appointment, please call the clinic directly or you may call the MTM scheduling line at [phone].    My Clinical Pharmacist's contact information:                                                      Please feel free to contact me with any questions or concerns you have.      Donna Graff), PharmD  Endocrine & Diabetes Medication Therapy Management Pharmacist   [address] [place] [id number]    "

## 2024-11-12 NOTE — PROGRESS NOTES
Medication Therapy Management (MTM) Encounter    ASSESSMENT:                            Medication Adherence/Access: No issues identified.    Diabetes /Weight Management: Since unable to get updated data from patient's pump via the [employer] portal, going off recent CGM data which shows that patient's time in range remains below goal of >70% thus recommended dose increase of Ozempic. Patient will decide when to increase dose based on their holiday schedule and educated how to do so. Recommend patient continue on current pump settings since patient has had no recent events of hypoglycemia but recommend patient reach out as needed for assistance. May need to upload patient's pump manually at upcoming visit with Dr. Ledesma.     PLAN:                            With your schedule determine if you want to increase to the 2mg Ozempic dose starting 11/28 or 12/12. If starting 2mg on 12/12 then continue to use 1mg dose ~37 clicks from 2mg pen for 2 more weeks then change to 2mg dose on 12/12.    Continue on current pump settings, if you start to have frequent low blood sugars then reach out to assist with adjustments.     Follow-up: 12/24/2024 at 9AM    Endocrine Team & Next Follow-Up:  11/25/24 with Dr. Ledesma   12/24/24 with Donna    SUBJECTIVE/OBJECTIVE:                          Brigitte Robles is a 49 year old female called for a follow-up visit.       Reason for visit: Ozempic follow up.    Allergies/ADRs: Reviewed in chart  Past Medical History: Reviewed in chart  Tobacco: She reports that she has never smoked. She has been exposed to tobacco smoke. She has never used smokeless tobacco.  Alcohol: Reviewed in chart     Medication Adherence/Access: no issues reported.    Diabetes /Weight Management:   Patient diagnosed with type 1 diabetes, previously treated for type 2 after diagnostic workup was complete.   Current Medications:  Ozempic 1mg weekly on Thursdays- patient has recently increased dose  and has completed 2 doses at 1mg dose with 3rd dose planned for this week. Patient reports some nausea on day of the injection and notes that symptoms are overall tolerable. Notes that constipation symptoms are off and on but overall tolerable. Reports that symptoms have not worsened since increasing dose.   Patient notes that they are using Tslim X2 insulin pump currently but warranty has ended at the end of October. Patient notes that they are working tandem directly to start on new pump. After multiple troubleshoot efforts, was not able to have patient's pump data upload to [employer] portal. Patient confirms the following pump settings below.     Keeps Tresiba on hand as needed for vacation or pump failure   Medication history per chart review:  - metformin: ineffective  Weight:  Patient notes that they have not been monitoring their weight but overall feels less hungry and has a reduced appetite while using Ozempic.   Blood sugar monitoring: Continuous Glucose Monitor:                  Today's Vitals: LMP 09/22/2021 (Approximate)   ----------------  I spent 33 minutes with this patient today. All changes were made via collaborative practice agreement with Lloyd Ledesma. A copy of the visit note was provided to the patient's provider(s).    A summary of these recommendations was sent via [employer].    Donna Graff), PharmD  Endocrine & Diabetes Medication Therapy Management Pharmacist   [address] [place] [id number]     Telemedicine Visit Details  The patient's medications can be safely assessed via a telemedicine encounter.  Type of service:  Telephone visit  Originating Location (pt. Location): Home    Distant Location (provider location):  Off-site  Start Time:  9:00 AM  End Time: 9:33 AM     Medication Therapy Recommendations  Morbid obesity (H)   1 Current Medication: Semaglutide, 1 MG/DOSE, (OZEMPIC) 4 MG/3ML pen (Discontinued)   Current Medication Sig: Inject 1 mg subcutaneously  every 7 days.   Rationale: Dose too low - Dosage too low - Effectiveness   Recommendation: Increase Dose   Status: Accepted per CPA   Identified Date: 11/12/2024 Completed Date: 11/12/2024

## 2024-11-13 ENCOUNTER — DOCUMENTATION ONLY (OUTPATIENT)
Dept: ENDOCRINOLOGY | Facility: CLINIC | Age: 49
End: 2024-11-13
Payer: COMMERCIAL

## 2024-11-13 DIAGNOSIS — E10.9 TYPE 1 DIABETES MELLITUS WITHOUT COMPLICATION (H): Primary | ICD-10-CM

## 2024-11-13 NOTE — PROGRESS NOTES
Type of form:  SMN   From:  [employer]andem  Supplies requested on form:  tslim pump and dexcom  Provider:  Dr. Lloyd Ledesma   Date provider will be in clinic to sign:  11/18/24  Form completed to the best of writers ability and placed in providers file for completion.    Marielena Matthew CMA  Adult Endocrinology  [facility], [place]

## 2024-11-15 NOTE — TELEPHONE ENCOUNTER
Medication Requested:  INSULIN PUMP - OUTPATIENT -- --  -- --   Sig - Route: Date Last Updated: 1/25/2021  Tandem     ----------------------  Last Office Visit : 4/29/2024  [facility]      Future Office visit:     11/25/2024 12:10 PM (50 min)  [facility]   RETURN DIABETES   [facility] ([place])   Lloyd Ledesma MD [facility] END[facility] NURSE     ----------------------          Medication unable to be refilled by RN due to criteria not met as indicated.       Insulin and insulin pump supplies - refilled per Endocrine clinic.  Historical script

## 2024-11-18 ENCOUNTER — MEDICAL CORRESPONDENCE (OUTPATIENT)
Dept: HEALTH INFORMATION MANAGEMENT | Facility: CLINIC | Age: 49
End: 2024-11-18

## 2024-11-18 RX ORDER — SUBCUTANEOUS INSULIN PUMP
1 EACH MISCELLANEOUS SEE ADMIN INSTRUCTIONS
Qty: 1 EACH | Refills: 0 | Status: SHIPPED | OUTPATIENT
Start: 2024-11-18

## 2024-11-18 NOTE — TELEPHONE ENCOUNTER
Email sent to Riri Oliver verifying of correct distributor.     Prescription completed.     Cora Amos RN, Formerly named [facility]  Diabetes Education Department  [facility], [place]  Phone: [phone]  [phone]

## 2024-11-18 NOTE — PROGRESS NOTES
Provider reviewed and signed.   Faxed back to [phone] as requested on cover sheet.   See image below for Timestamp confirmation of successful transmission.         Placed in [employer] file      Britany Levy CMA  Adult Endocrinology   [facility]

## 2024-11-21 NOTE — PROGRESS NOTES
Outcome for 11/21/24 1:31 PM: Device upload instructions sent to patient via Yasmeen Levy CMA  Adult Endocrinology   [facility]

## 2024-11-25 ENCOUNTER — OFFICE VISIT (OUTPATIENT)
Dept: ENDOCRINOLOGY | Facility: CLINIC | Age: 49
End: 2024-11-25
Payer: COMMERCIAL

## 2024-11-25 VITALS
OXYGEN SATURATION: 96 % | BODY MASS INDEX: 36.57 KG/M2 | DIASTOLIC BLOOD PRESSURE: 80 MMHG | WEIGHT: 230 LBS | SYSTOLIC BLOOD PRESSURE: 117 MMHG | HEART RATE: 74 BPM | RESPIRATION RATE: 16 BRPM

## 2024-11-25 DIAGNOSIS — E06.3 HYPOTHYROIDISM DUE TO HASHIMOTO'S THYROIDITIS: ICD-10-CM

## 2024-11-25 RX ORDER — LEVOTHYROXINE SODIUM 125 MCG
TABLET ORAL
Qty: 84 TABLET | Refills: 4 | Status: SHIPPED | OUTPATIENT
Start: 2024-11-25

## 2024-11-25 NOTE — PATIENT INSTRUCTIONS
Welcome to the [facility] Endocrinology and Diabetes Clinics     Our Endocrinology Clinics are here to provide you with a team-based, collaborative approach in the diagnosis and treatment of patients with diabetes and endocrine disorders. The team is made up of Physicians, Physician Assistants, Certified Diabetes Educators, Registered Nurses, Medical Assistants, Emergency Medical Technicians, and many others, all of whom have the unified goal of providing our patients with high quality care.     Please see below for some helpful tips to best navigate and use the [facility] Endocrinology clinic:     [place] Respect: At [facility], we are committed to a respectful and safe space for all patients, visitors, and staff.  We believe that mutual respect between patients and their care team is the foundation of quality care.  It is our expectation that you will be treated with respect by your care team.  In turn, we ask that all communication with the care team (written and verbal) be respectful and free from profanity, threatening, or abusive language.  Disrespectful communication undermines our therapeutic relationship with you and may result in us being unable to continue to provide your care.    Refills: A provider must see you at least annually to prescribe and refill medications. This is to ensure your safety as well as meet insurance and compliance regulations.    Scheduling: Many of our Providers offer both in-person or video visits. Please call to schedule any needed follow ups as soon as possible because our provider schedules fill up very quickly. Our care team has the right to require an in-person visit when they believe that it is medically necessary. Please remember that for any virtual visits, you must be in the [facility] at the time of the visit, otherwise we are unable to see you and you will need to be rescheduled.    Missed Appointments: If you need to cancel or miss your  scheduled appointment, please call the clinic at [phone] to reschedule.  Please note if you repeatedly miss appointments or repeatedly miss appointments without calling to inform us ahead of time (no-show), the clinic may elect to not allow you to reschedule without speaking to a manager, may require a Partnership In Care Agreement prior to rescheduling, or could result in you no longer being able to receive care from the clinic. Providing the clinic with timely notification if you have to miss an appointment, allows us to better serve the needs of all of our patients.    Primary Care Provider: Our Endocrinologists are Specialists in their field. We expect you to have a Primary Care Provider established to handle any needs outside of your diabetes and endocrine care.  We would be happy to assist you find a Primary Care Provider, if you do not have one.    Consulting Services: [employer] is a wonderful resource that allows you access to your Care Team via online or the blake. Please ask a member of the team if you would like help creating an account. Please note that it may take up to 2 business days for a response. [employer] messages are not reviewed on weekends or after business hours.  Emergent or urgent care needs should never be communicated via [employer].  If you experience a medical emergency call 911 or go to the nearest emergency room.    Labs: It is recommended that you stay within the [facility] for labs but you are welcome to obtain ordered labs (with some exceptions) from any location of your choice as long as they are able to complete and process the needed labs. If you need us to fax orders to your preferred lab, please provide us the name and fax number of the lab you would like to go to so we can fax the orders. If your labs are drawn outside of the [facility], please have them fax the results to [phone] ([place]) or [phone] ([place]) or via [facility]. It is your  responsibility to ensure that outside lab results are sent to us.    We look forward to working with you. Please do not hesitate to reach out with any questions.    Thank you,    The Endocrine Team    [facility] Address:   [place] Address:     [address] [place] [address]    Phone: [phone]  Fax: [phone] [address] [place] [address]    Phone: [phone]  Fax: [phone]     [facility] Cost Estimate Phone Number: [phone]    General Lab and Imaging Scheduling Phone Number: [phone]

## 2024-11-25 NOTE — PROGRESS NOTES
Endocrinology Note         Brigitte is a 49 year old female who has a visit for type 1 diabetes.    HPI  Ms Brigitte Robles is a 49 years old female who has a visit for type 1 diabetes.     She was noted to have increased blurred vision and fatigue in February 2016 and noted to have blood glucose of 300s and A1C of 7.0%. She was initially diagnosed with type 2 diabetes and was started on metformin 500 mg bid which did not control her blood glucose. She was then seen by her PCP who ran more blood test and noted for low c-peptide and positive MONTSERRAT ab and IA2 antibody. She was then started on insulin since.    Interval history:  She is currently using Tandem and Dexcom G7. She has been quite busy for the past few months so has not done much of exercise.     1) type 1 diabetes:  A1c is 7.3% on 9/9/2024. Reviewed Dexcom and Tandem. She is using Control IQ. Her pattern is postprandial hyperglycemia after lunch and dinner. Overnight BGs were good.     Average glucose 170, SD 77, GMI 7.4%, time in range 74%, time above range 30%, time below range 0.2%.    She restarted Ozempic again around September 2024 and so far tolerated it well. She is currently on Ozempic 1.0 mg weekly. It helps with both weight and diabetes. Her numbers are more controlled.  She will be on 2 mg starting next week.    Pump setting  Basal rate  Midnight: 1.8 unit/hr  1000 PM: 1.8 unit/hr    ICR  Midnight 1 unit per 8 gram  1000 PM: 1 unit per 8 gram    Correction factor  Midnight: 1 unit per 50 mg/dl  0300 AM: 1 unit per 50 mg/dl  1000 PM: 1 unit per 50 mg/dl    Target  mg/dl    2) hypothyroidism:  It was diagnosed about 6 months postpartum. Lab on 9/9/2024 showed TSH 2.7. She is currently on Synthroid 125 mcg daily for 6 days per week.     Past Medical History  Type 1 diabetes  Hypothyroidism  Hx of hysterectomy in 2021 due to heavy menses and anemia.    Allergies  Allergies   Allergen Reactions    Levothyroxine      Dizzy to generic --- not to  synthroid     Medications  Current Outpatient Medications   Medication Sig Dispense Refill    atorvastatin (LIPITOR) 10 MG tablet Take 1 tablet (10 mg) by mouth daily 90 tablet 3    blood glucose monitoring (ONETOUCH VERIO IQ) test strip Use to test blood sugar 10 times daily or as directed. 900 strip 3    citalopram (CELEXA) 20 MG tablet Take 1 tablet (20 mg) by mouth daily 90 tablet 3    Continuous Glucose Sensor (DEXCOM G7 SENSOR) MISC Change every 10 days. Please dispense G7 sensor with underline below LBL code on the side of box. This is needed for compatibility with insulin pump. 9 each 3    Glucagon (BAQSIMI) 3 MG/DOSE nasal powder Spray 1 spray (3 mg) in nostril as needed (for hypoglycemia episode) in the event of unconscious hypoglycemia or hypoglycemic seizure. May repeat dose if no response after 15 minutes. 1 each 1    insulin aspart (NOVOLOG VIAL) 100 UNITS/ML vial Using up to 80 units in insulin pump. 80 mL 2    Insulin Infusion Pump (T:SLIM X2 CONTROL-IQ 7.8 PUMP) MAGGY 1 each by Other route See Admin Instructions. Change every 2-3 days 1 each 0    insulin pen needle (B-D U/F) 31G X 5 MM miscellaneous USE 4 DAILY OR AS DIRECTED. 400 each 3    INSULIN PUMP - OUTPATIENT Date Last Updated: 1/25/2021  Tandem  Pump Website Username: [email or web address]  Pump Website Password: [email or web address]      lisinopril (ZESTRIL) 5 MG tablet Take 1 tablet (5 mg) by mouth daily 90 tablet 3    ondansetron (ZOFRAN ODT) 4 MG ODT tab Take 1 tablet (4 mg) by mouth every 6 hours as needed for nausea. 12 tablet 0    ONETOUCH DELICA LANCETS 33G MISC 1 Box 6 times daily 100 each 12    Semaglutide, 2 MG/DOSE, (OZEMPIC) 8 MG/3ML pen Inject 2 mg subcutaneously every 7 days. 9 mL 3    sodium chloride (OCEAN) 0.65 % nasal spray Spray 2 sprays into both nostrils every 2 hours (while awake). 50 mL 11    SYNTHROID 125 MCG tablet TAKE 1 TABLET DAILY FOR 6 DAYS 84 tablet 2    cyclobenzaprine (FLEXERIL) 10 MG tablet Take 1 tablet  "(10 mg) by mouth 3 times daily as needed for muscle spasms (Patient not taking: Reported on 11/25/2024) 30 tablet 0    insulin degludec (TRESIBA FLEXTOUCH) 200 UNIT/ML pen INJECT 30 UNITS SUBCUTANEOUS DAILY (Patient not taking: Reported on 11/25/2024) 15 mL 3     Family History  Type 1 diabetes in her father, mother, paternal aunt, paternal uncle, brother and sister  Type 2 diabetes in her grandmother  Heart in her maternal grandfather and maternal grandmother  hypothyroid in her brother, father, maternal grandfather, maternal grandmother, mother, and sister.     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    Weight as of this encounter: 104.3 kg (230 lb).   Constitutional: no distress, comfortable, pleasant   HEENT: no pallor  CVS: RRR, normal S1,S2, no murmur  Lungs: CTA B/L  Skin: no jaundice   Neurological:intact sensation per monofilament test bilaterally (exam 4/29/2024)  Psychological: appropriate mood     RESULTS  Lab Results   Component Value Date    A1C 7.6 04/29/2024    A1C 7.6 12/14/2021    A1C 7.5 11/04/2021    A1C 7.2 04/26/2021    A1C 7.9 12/15/2020    A1C 7.7 06/23/2020    A1C 7.4 10/07/2019    A1C 7.2 06/10/2019        Latest Ref Rng 10/9/2023  1:00 PM   ENDO DIABETES     Cholesterol <200 mg/dL 173    LDL Cholesterol Calculated <=100 mg/dL 83    HDL Cholesterol >=50 mg/dL 66    Non HDL Cholesterol <130 mg/dL 107    VLDL-Cholesterol 0 - 30 mg/dL    Triglycerides <150 mg/dL 118    TRIG (EXT) <150 mg/dL 118    Creatinine 0.51 - 0.95 mg/dL 0.86    Hematocrit 35.0 - 47.0 %    Hemoglobin 11.7 - 15.7 g/dL  "   Albumin Urine mg/L mg/L    Albumin Urine mg/L mg/L <12.0       Latest Ref Rng 10/9/2023  1:00 PM   ENDO THYROID LABS-UMP     TSH 0.30 - 4.20 uIU/mL 2.32    Triiodothyronine (T3) 60 - 181 ng/dL    FREE T4 0.90 - 1.70 ng/dL 1.05          ASSESSMENT:    Ms Brigitte Robles is a 49 years old female who has a visit for follow up type 1 diabetes and hypothyroidism    1) type 1 diabetes: diagnosed type 1 diabetes in Feb 2016. A1c is 7.3%.  Pattern showed higher postrandial BG after lunch and dinner mostly due to lack of exercise due to busy schedule.    Plan: continue current pump setting -- may need change after starting Ozempic to 2.0 mg   Pump setting  Basal rate  Midnight: 1.6 unit/hr  1000 PM: 1.6 unit/hr    ICR  Midnight 1 unit per 8 gram  1000 PM: 1 unit per 8 gram    Correction factor  Midnight: 1 unit per 40 mg/dl  1000 PM: 1 unit per 40 mg/dl    - continue using Control IQ  - increase Ozempic to 2.0 mg weekly    2) Hypothyroidism:clinically euthyroid. Lab was normal in 9/2024. Continue Synthroid 125 mcg x6 days per week    3) Obesity: BMI 34. Encourage to continue on healthy diet and exercise.   - Lost 20 lbs with Ozempic and had to stop in 2023 due to persistent nausea.  - restarted Ozempic again around September 2024 and so far tolerated it well  - continue healthy diet and exercise  - increase Ozempic to 2.0 mg weekly next week    PLAN:   - continue insulin pump as above  - increase Ozempic to 2.0 mg weekly next week -- see MTM in 1 month  - RTC 6 months       Lloyd Ledesma MD  Endocrinology

## 2024-11-25 NOTE — NURSING NOTE
Brigitte Robles's goals for this visit include:   Chief Complaint   Patient presents with    Follow Up    Diabetes       She requests these members of her care team be copied on today's visit information: yes    PCP: Kehr, Kristen M    Referring Provider:  Referred Self, MD  No address on file    /80   Pulse 74   Resp 16   Wt 104.3 kg (230 lb)   LMP 09/22/2021 (Approximate)   SpO2 96%   BMI 36.57 kg/m      Do you need any medication refills at today's visit? Unsure    León Peralta, EMT

## 2024-11-25 NOTE — LETTER
11/25/2024      Brigitte Robles  [address] [place] [id number]      Dear Colleague,      Thank you for referring your patient, Brigitte Robles, to the [facility]. Please see a copy of my visit note below.    Outcome for 11/21/24 1:31 PM: Device upload instructions sent to patient via Yasmeen Levy CMA  Adult Endocrinology   [facility]          Endocrinology Note         Brigitte is a 49 year old female who has a visit for type 1 diabetes.    HPI  Ms Brigitte Robles is a 49 years old female who has a visit for type 1 diabetes.     She was noted to have increased blurred vision and fatigue in February 2016 and noted to have blood glucose of 300s and A1C of 7.0%. She was initially diagnosed with type 2 diabetes and was started on metformin 500 mg bid which did not control her blood glucose. She was then seen by her PCP who ran more blood test and noted for low c-peptide and positive MONTSERRAT ab and IA2 antibody. She was then started on insulin since.    Interval history:  She is currently using Tandem and Dexcom G7. She has been quite busy for the past few months so has not done much of exercise.     1) type 1 diabetes:  A1c is 7.3% on 9/9/2024. Reviewed Dexcom and Tandem. She is using Control IQ. Her pattern is postprandial hyperglycemia after lunch and dinner. Overnight BGs were good.     Average glucose 170, SD 77, GMI 7.4%, time in range 74%, time above range 30%, time below range 0.2%.    She restarted Ozempic again around September 2024 and so far tolerated it well. She is currently on Ozempic 1.0 mg weekly. It helps with both weight and diabetes. Her numbers are more controlled.  She will be on 2 mg starting next week.    Pump setting  Basal rate  Midnight: 1.8 unit/hr  1000 PM: 1.8 unit/hr    ICR  Midnight 1 unit per 8 gram  1000 PM: 1 unit per 8 gram    Correction factor  Midnight: 1 unit per 50 mg/dl  0300 AM: 1 unit per 50 mg/dl  1000 PM: 1  unit per 50 mg/dl    Target  mg/dl    2) hypothyroidism:  It was diagnosed about 6 months postpartum. Lab on 9/9/2024 showed TSH 2.7. She is currently on Synthroid 125 mcg daily for 6 days per week.     Past Medical History  Type 1 diabetes  Hypothyroidism  Hx of hysterectomy in 2021 due to heavy menses and anemia.    Allergies  Allergies   Allergen Reactions    Levothyroxine      Dizzy to generic --- not to synthroid     Medications  Current Outpatient Medications   Medication Sig Dispense Refill    atorvastatin (LIPITOR) 10 MG tablet Take 1 tablet (10 mg) by mouth daily 90 tablet 3    blood glucose monitoring (ONETOUCH VERIO IQ) test strip Use to test blood sugar 10 times daily or as directed. 900 strip 3    citalopram (CELEXA) 20 MG tablet Take 1 tablet (20 mg) by mouth daily 90 tablet 3    Continuous Glucose Sensor (DEXCOM G7 SENSOR) MISC Change every 10 days. Please dispense G7 sensor with underline below LBL code on the side of box. This is needed for compatibility with insulin pump. 9 each 3    Glucagon (BAQSIMI) 3 MG/DOSE nasal powder Spray 1 spray (3 mg) in nostril as needed (for hypoglycemia episode) in the event of unconscious hypoglycemia or hypoglycemic seizure. May repeat dose if no response after 15 minutes. 1 each 1    insulin aspart (NOVOLOG VIAL) 100 UNITS/ML vial Using up to 80 units in insulin pump. 80 mL 2    Insulin Infusion Pump (T:SLIM X2 CONTROL-IQ 7.8 PUMP) MAGGY 1 each by Other route See Admin Instructions. Change every 2-3 days 1 each 0    insulin pen needle (B-D U/F) 31G X 5 MM miscellaneous USE 4 DAILY OR AS DIRECTED. 400 each 3    INSULIN PUMP - OUTPATIENT Date Last Updated: 1/25/2021  Tandem  Pump Website Username: [email or web address]  Pump Website Password: [email or web address]      lisinopril (ZESTRIL) 5 MG tablet Take 1 tablet (5 mg) by mouth daily 90 tablet 3    ondansetron (ZOFRAN ODT) 4 MG ODT tab Take 1 tablet (4 mg) by mouth every 6 hours as needed for nausea. 12  "tablet 0    ONETOUCH DELICA LANCETS 33G MISC 1 Box 6 times daily 100 each 12    Semaglutide, 2 MG/DOSE, (OZEMPIC) 8 MG/3ML pen Inject 2 mg subcutaneously every 7 days. 9 mL 3    sodium chloride (OCEAN) 0.65 % nasal spray Spray 2 sprays into both nostrils every 2 hours (while awake). 50 mL 11    SYNTHROID 125 MCG tablet TAKE 1 TABLET DAILY FOR 6 DAYS 84 tablet 2    cyclobenzaprine (FLEXERIL) 10 MG tablet Take 1 tablet (10 mg) by mouth 3 times daily as needed for muscle spasms (Patient not taking: Reported on 11/25/2024) 30 tablet 0    insulin degludec (TRESIBA FLEXTOUCH) 200 UNIT/ML pen INJECT 30 UNITS SUBCUTANEOUS DAILY (Patient not taking: Reported on 11/25/2024) 15 mL 3     Family History  Type 1 diabetes in her father, mother, paternal aunt, paternal uncle, brother and sister  Type 2 diabetes in her grandmother  Heart in her maternal grandfather and maternal grandmother  hypothyroid in her brother, father, maternal grandfather, maternal grandmother, mother, and sister.     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    Weight as of this encounter: 104.3 kg (230 lb).   Constitutional: no distress, comfortable, pleasant   HEENT: no pallor  CVS: RRR, normal S1,S2, no murmur  Lungs: CTA B/L  Skin: no jaundice   Neurological:intact sensation per monofilament test bilaterally (exam 4/29/2024)  Psychological: appropriate mood     RESULTS  Lab Results   Component Value Date    A1C 7.6 04/29/2024    A1C 7.6 12/14/2021    A1C 7.5 11/04/2021    A1C 7.2 04/26/2021    A1C 7.9 12/15/2020    A1C 7.7 06/23/2020    A1C 7.4 " 10/07/2019    A1C 7.2 06/10/2019        Latest Ref Rng 10/9/2023  1:00 PM   ENDO DIABETES     Cholesterol <200 mg/dL 173    LDL Cholesterol Calculated <=100 mg/dL 83    HDL Cholesterol >=50 mg/dL 66    Non HDL Cholesterol <130 mg/dL 107    VLDL-Cholesterol 0 - 30 mg/dL    Triglycerides <150 mg/dL 118    TRIG (EXT) <150 mg/dL 118    Creatinine 0.51 - 0.95 mg/dL 0.86    Hematocrit 35.0 - 47.0 %    Hemoglobin 11.7 - 15.7 g/dL    Albumin Urine mg/L mg/L    Albumin Urine mg/L mg/L <12.0       Latest Ref Rng 10/9/2023  1:00 PM   ENDO THYROID LABS-[facility]     TSH 0.30 - 4.20 uIU/mL 2.32    Triiodothyronine (T3) 60 - 181 ng/dL    FREE T4 0.90 - 1.70 ng/dL 1.05          ASSESSMENT:    Ms Brigitte Robles is a 49 years old female who has a visit for follow up type 1 diabetes and hypothyroidism    1) type 1 diabetes: diagnosed type 1 diabetes in Feb 2016. A1c is 7.3%.  Pattern showed higher postrandial BG after lunch and dinner mostly due to lack of exercise due to busy schedule.    Plan: continue current pump setting -- may need change after starting Ozempic to 2.0 mg   Pump setting  Basal rate  Midnight: 1.6 unit/hr  1000 PM: 1.6 unit/hr    ICR  Midnight 1 unit per 8 gram  1000 PM: 1 unit per 8 gram    Correction factor  Midnight: 1 unit per 40 mg/dl  1000 PM: 1 unit per 40 mg/dl    - continue using Control IQ  - increase Ozempic to 2.0 mg weekly    2) Hypothyroidism:clinically euthyroid. Lab was normal in 9/2024. Continue Synthroid 125 mcg x6 days per week    3) Obesity: BMI 34. Encourage to continue on healthy diet and exercise.   - Lost 20 lbs with Ozempic and had to stop in 2023 due to persistent nausea.  - restarted Ozempic again around September 2024 and so far tolerated it well  - continue healthy diet and exercise  - increase Ozempic to 2.0 mg weekly next week    PLAN:   - continue insulin pump as above  - increase Ozempic to 2.0 mg weekly next week -- see MTM in 1 month  - RTC 6 months         Again, thank you for  allowing me to participate in the care of your patient.      Sincerely,    Lloyd Ledesma MD

## 2024-11-26 ENCOUNTER — MYC MEDICAL ADVICE (OUTPATIENT)
Dept: ENDOCRINOLOGY | Facility: CLINIC | Age: 49
End: 2024-11-26
Payer: COMMERCIAL

## 2024-11-26 DIAGNOSIS — E10.9 TYPE 1 DIABETES MELLITUS WITHOUT COMPLICATION (H): Primary | ICD-10-CM

## 2024-11-26 RX ORDER — INSULIN PUMP CARTRIDGE
1 CARTRIDGE (EA) SUBCUTANEOUS
Qty: 30 EACH | Refills: 3 | Status: SHIPPED | OUTPATIENT
Start: 2024-11-26

## 2024-11-26 RX ORDER — INFUSION SET FOR INSULIN PUMP
1 INFUSION SETS-PARAPHERNALIA MISCELLANEOUS
Qty: 30 EACH | Refills: 3 | Status: SHIPPED | OUTPATIENT
Start: 2024-11-26

## 2024-12-02 LAB
ACID FAST STAIN (ARUP): NORMAL

## 2024-12-16 NOTE — PROGRESS NOTES
Facial Plastic and Reconstructive Surgery Follow up      HPI:   I had the pleasure of seeing Brigitte Robles today in clinic for follow up. We went to the OR on 10/7/24 for a functional septorhinoplasty and this was aborted due to a mass that was present on the septum.     She reports that she is using saline spray and gel about 1-2 times per day. She is now getting large crusts out of the left side of her nose. These are dark and hard, she can feel them building up then they come out.       Final Diagnosis   Nasal cavity, right nasal septum, biopsy:  --Small polypoid fragments of mucosa and submucosa with acute and chronic inflammation.  No evidence of malignancy.       1+ Cutibacterium (Propionibacterium) acnes Abnormal         Culture 2+ Staphylococcus aureus Abnormal         Resulting Agency: [facility]     Susceptibility     Staphylococcus aureus     JEYSON     Clindamycin 0.25 ug/mL Susceptible     Daptomycin 0.25 ug/mL Susceptible     Doxycycline <=0.5 ug/mL Susceptible     Erythromycin <=0.25 ug/mL Susceptible     Gentamicin <=0.5 ug/mL Susceptible     Oxacillin 0.5 ug/mL Susceptible 1     Tetracycline <=1 ug/mL Susceptible     Trimethoprim/Sulfamethoxazole <=0.5/9.5 u... Susceptible     Vancomycin <=0.5 ug/mL Susceptible                         Review Of Systems  ROS: 10 point ROS neg other than the symptoms noted above in the HPI.    Patient Active Problem List   Diagnosis    Depressive disorder, not elsewhere classified    Hypothyroidism    GLUCOCORTICOID DEFICIENT    Anxiety    CARDIOVASCULAR SCREENING; LDL GOAL LESS THAN 160    Seasonal allergic rhinitis    Type 1 diabetes mellitus without complication (H)    Morbid obesity (H)    MONTSERRAT (generalized anxiety disorder)    Mild major depression (H)    Metrorrhagia     Past Surgical History:   Procedure Laterality Date    ABDOMEN SURGERY  11.12.2021    Hysterectomy    COLONOSCOPY WITH CO2 INSUFFLATION N/A 7/18/2023    Procedure: Colonoscopy with CO2 insufflation;   "Surgeon: Flavio Small, DO;  Location: [facility] OR    HC REMOVAL OF TONSILS,13+ Y/O       Current Outpatient Medications   Medication Sig Dispense Refill    atorvastatin (LIPITOR) 10 MG tablet Take 1 tablet (10 mg) by mouth daily 90 tablet 3    blood glucose monitoring (ONETOUCH VERIO IQ) test strip Use to test blood sugar 10 times daily or as directed. 900 strip 3    citalopram (CELEXA) 20 MG tablet Take 1 tablet (20 mg) by mouth daily 90 tablet 3    Continuous Glucose Sensor (DEXCOM G7 SENSOR) MISC Change every 10 days. Please dispense G7 sensor with underline below LBL code on the side of box. This is needed for compatibility with insulin pump. 9 each 3    cyclobenzaprine (FLEXERIL) 10 MG tablet Take 1 tablet (10 mg) by mouth 3 times daily as needed for muscle spasms (Patient not taking: Reported on 11/25/2024) 30 tablet 0    Glucagon (BAQSIMI) 3 MG/DOSE nasal powder Spray 1 spray (3 mg) in nostril as needed (for hypoglycemia episode) in the event of unconscious hypoglycemia or hypoglycemic seizure. May repeat dose if no response after 15 minutes. 1 each 1    insulin aspart (NOVOLOG VIAL) 100 UNITS/ML vial Using up to 80 units in insulin pump. 80 mL 2    insulin degludec (TRESIBA FLEXTOUCH) 200 UNIT/ML pen INJECT 30 UNITS SUBCUTANEOUS DAILY (Patient not taking: Reported on 11/25/2024) 15 mL 3    Insulin Infusion Pump (T:SLIM X2 CONTROL-IQ 7.8 PUMP) MAGGY 1 each by Other route See Admin Instructions. Change every 2-3 days 1 each 0    Insulin Infusion Pump Supplies (AUTOSOFT 90 INFUSION SET) MISC 1 each every 3 days. Please dispense 9mm, 23\" 30 each 3    Insulin Infusion Pump Supplies (T:SLIM X2 3ML CARTRIDGE) MISC 1 each every 3 days. 30 each 3    insulin pen needle (B-D U/F) 31G X 5 MM miscellaneous USE 4 DAILY OR AS DIRECTED. 400 each 3    INSULIN PUMP - OUTPATIENT Date Last Updated: 1/25/2021  Tandem  Pump Website Username: [email or web address]  Pump Website Password: [email or web address]      " lisinopril (ZESTRIL) 5 MG tablet Take 1 tablet (5 mg) by mouth daily 90 tablet 3    ondansetron (ZOFRAN ODT) 4 MG ODT tab Take 1 tablet (4 mg) by mouth every 6 hours as needed for nausea. 12 tablet 0    ONETOUCH DELICA LANCETS 33G MISC 1 Box 6 times daily 100 each 12    Semaglutide, 2 MG/DOSE, (OZEMPIC) 8 MG/3ML pen Inject 2 mg subcutaneously every 7 days. 9 mL 3    sodium chloride (OCEAN) 0.65 % nasal spray Spray 2 sprays into both nostrils every 2 hours (while awake). 50 mL 11    SYNTHROID 125 MCG tablet TAKE 1 TABLET DAILY FOR 6 DAYS 84 tablet 4     Levothyroxine  Social History     Socioeconomic History    Marital status:      Spouse name: Dougie    Number of children: 1    Years of education: 18    Highest education level: Not on file   Occupational History    Occupation: MLT     Comment: [employer]   Tobacco Use    Smoking status: Never     Passive exposure: Past    Smokeless tobacco: Never   Vaping Use    Vaping status: Never Used   Substance and Sexual Activity    Alcohol use: Yes     Comment: rare    Drug use: No    Sexual activity: Yes     Partners: Male     Birth control/protection: Male Surgical, Female Surgical     Comment:  had vascetomy 9 yrs ago   Other Topics Concern    Parent/sibling w/ CABG, MI or angioplasty before 65F 55M? No     Service No    Blood Transfusions No    Caffeine Concern No    Occupational Exposure Yes    Hobby Hazards No    Sleep Concern No    Stress Concern No    Weight Concern No    Special Diet No    Back Care No    Exercise Yes     Comment: Cardio, walking    Bike Helmet No    Seat Belt Yes    Self-Exams No     Comment: Every three months   Social History Narrative    Not on file     Social Drivers of Health     Financial Resource Strain: Not on file   Food Insecurity: Not on file   Transportation Needs: Not on file   Physical Activity: Not on file   Stress: Not on file   Social Connections: Not on file   Interpersonal Safety: Low Risk  (8/5/2024)     Interpersonal Safety     Do you feel physically and emotionally safe where you currently live?: Yes     Within the past 12 months, have you been hit, slapped, kicked or otherwise physically hurt by someone?: No     Within the past 12 months, have you been humiliated or emotionally abused in other ways by your partner or ex-partner?: No   Housing Stability: Not on file     Family History   Problem Relation Age of Onset    Diabetes Mother     Hypertension Mother     Thyroid Disease Mother     Diabetes Father         1    Thyroid Disease Father         BORIS'S    Diabetes Sister     Thyroid Disease Sister     Heart Disease Maternal Grandmother         TRIPLE BYPASS    Thyroid Disease Maternal Grandmother     Diabetes Maternal Grandmother         2    Heart Disease Maternal Grandfather         TRIPLE BYPASS    Coronary Artery Disease Maternal Grandfather     Thyroid Disease Maternal Grandfather     Thyroid Disease Brother     Diabetes Paternal Aunt     Diabetes Paternal Uncle     Diabetes Brother         1    Thyroid Disease Brother     Diabetes Nephew         1    Depression Sister     Thyroid Disease Sister     Diabetes Sister         1    Breast Cancer No family hx of     Cancer - colorectal No family hx of        PE:  Alert and Oriented, Answering Questions Appropriately  Atraumatic, Normocephalic, Face Symmetric  Skin: Calloway 2  Facial Nerve Intact and facial movement symmetric  EOMI  Nasal Exam: No external Deformity, see procedure note below  Chin: Normal   Lips/Teeth/Toungue/Gums: Lips intact  Neck: Trachea midline  Chest: No wheezing, cyanosis, or stridor  Card: not diaphoretic  Neuro/Psych: CN's 2-12 intact, Moves all extremities, ambulation in intact, positive affect, no notable muscle weakness          PROCEDURE:Nasal endoscopy    Indication: Nasal Endoscopy is performed to provide a more thorough evaluation of the nasal airway than seen on standard nasal speculum exam.    Performed by: Dr. Zuñiga  Cesar Brown MD  Written consent was obtained prior to procedure.     Findings are as follows:   She continues to have an area of adherent crust on right anterior septum. There was a large crust in left nostril adherent to left inferior turbinate that was debrided. The underlying mucosa is somewhat irregular in appearance and indented from the crust that was present, wedged between turbinate and septum.          IMPRESSION/PLAN: Brigitte Robles is a 49 year old female. She continues to have some irregularities of her septum and now her left inferior turbinate. I would like her to start twice daily bactroban, increase saline spray and gel, and add humidity. I will see her back in 1 month, sooner if there are issues. If these areas persist I may recommend a repeat biopsy in the operating room.     Zara Lacy MD       Photodocumentation was obtained.     I spent a total of 20 minutes in the care of patient Brigitte Robles during today's office visit. This time includes reviewing the patient's chart and prior history, obtaining a history, performing an examination and evaluation and counseling the patient. This time also includes ordering mediations or tests necessary in addition to communication to other member's of the patient's health care team. Time spent in documentation and care coordination is included.

## 2024-12-17 ENCOUNTER — OFFICE VISIT (OUTPATIENT)
Dept: PLASTIC SURGERY | Facility: CLINIC | Age: 49
End: 2024-12-17
Payer: COMMERCIAL

## 2024-12-17 DIAGNOSIS — J34.89 NASAL OBSTRUCTION: Primary | ICD-10-CM

## 2024-12-17 RX ORDER — MUPIROCIN 20 MG/G
OINTMENT TOPICAL 2 TIMES DAILY
Qty: 30 G | Refills: 1 | Status: SHIPPED | OUTPATIENT
Start: 2024-12-17

## 2024-12-17 NOTE — NURSING NOTE
Prescription was written for Bactroban, BID, on nasal mass, to pt's preferred pharmacy.     Pratibha Uribe RN  12/17/24 1:26 PM

## 2024-12-24 ENCOUNTER — VIRTUAL VISIT (OUTPATIENT)
Dept: PHARMACY | Facility: CLINIC | Age: 49
End: 2024-12-24
Attending: INTERNAL MEDICINE
Payer: COMMERCIAL

## 2024-12-24 DIAGNOSIS — E10.9 TYPE 1 DIABETES MELLITUS WITHOUT COMPLICATION (H): Primary | ICD-10-CM

## 2024-12-24 DIAGNOSIS — E66.01 MORBID OBESITY (H): ICD-10-CM

## 2024-12-24 NOTE — PROGRESS NOTES
Medication Therapy Management (MTM) Encounter    ASSESSMENT:                            Medication Adherence/Access: No issues identified.    Diabetes /Weight Management: Patient has tolerated transition to maximum dose Ozempic with no concerns. Time in target range remains slightly below goal of >70% however plan in place for patient to continue on current therapy for an additional 2 months to see if continued use at maximum dose Ozempic helps with additional blood sugar control. Did not discuss today but if time in target range remains below goal of >70% at follow up, could consider changing to more effective GLP1 Mounjaro therapy for additional benefits of weight loss and blood sugar control. Recommend patient continue on current pump settings since patient has had no recent events of hypoglycemia but recommend patient reach out as needed for assistance.      PLAN:                            Continue on Ozempic 2mg weekly and current pump settings, reach out if you have any low blood sugars with continuing on 2mg Ozempic dose to assist with pump setting adjustments.     Follow-up: 2/19/25 at 9AM    Endocrine Team & Next Follow-Up:  2/19/25 with Donna  6/2/25 with Dr. Ledesma    SUBJECTIVE/OBJECTIVE:                          Brigitte Robles is a 49 year old female called for a follow-up visit.       Reason for visit: Ozempic follow up.    Allergies/ADRs: Reviewed in chart  Past Medical History: Reviewed in chart  Tobacco: She reports that she has never smoked. She has been exposed to tobacco smoke. She has never used smokeless tobacco.  Alcohol: Reviewed in chart    Medication Adherence/Access: no issues reported.    Diabetes /Weight Management:   Patient diagnosed with type 1 diabetes, previously treated for type 2 after diagnostic workup was complete.   Current Medications:  Ozempic 2mg weekly on Thursdays (dose increase since last MTM visit) - patient notes that they have completed 3 doses at 2mg dose  thus far. Patient notes that they had one event of nausea/vomiting but notes that this was after a party and has not occurred since. Has some nausea symptoms on and off some mornings but notes that symptoms are overall tolerable.   Patient notes that they are using Tslim X2 insulin pump currently but warranty has ended at the end of October. She notes that she received a new pump but has not started using new system yet. Patient confirms the following pump settings below.     Keeps Tresiba on hand as needed for vacation or pump failure   Medication history per chart review:  - metformin: ineffective  Weight: Patient notes that they have not been monitoring their weight but overall feels less hungry and has a reduced appetite while using Ozempic.   Blood sugar monitoring: Continuous Glucose Monitor:            Today's Vitals: LMP 09/22/2021 (Approximate)   ----------------    I spent 12 minutes with this patient today. All changes were made via collaborative practice agreement with Lloyd Ledesma.     A summary of these recommendations was sent via [employer].    Donna Graff), PharmD  Endocrine & Diabetes Medication Therapy Management Pharmacist   [address] [place] [id number]     Telemedicine Visit Details  The patient's medications can be safely assessed via a telemedicine encounter.  Type of service:  Telephone visit  Originating Location (pt. Location): Home    Distant Location (provider location):  Off-site  Start Time:  9:02 AM  End Time: 9:14 AM     Medication Therapy Recommendations  No medication therapy recommendations to display

## 2024-12-24 NOTE — PATIENT INSTRUCTIONS
"Recommendations from today's MTM visit:                                                      Continue on Ozempic 2mg weekly and current pump settings, reach out if you have any low blood sugars with continuing on 2mg Ozempic dose to assist with pump setting adjustments.     Follow-up: 2/19/25 at 9AM    Endocrine Team & Next Follow-Up:  2/19/25 with Donna  6/2/25 with Dr. Ledesma    It was great speaking with you today.  I value your experience and would be very thankful for your time in providing feedback in our clinic survey. In the next few days, you may receive an email or text message from [employer] with a link to a survey related to your  clinical pharmacist.\"     To schedule another MTM appointment, please call the clinic directly or you may call the MTM scheduling line at [phone].    My Clinical Pharmacist's contact information:                                                      Please feel free to contact me with any questions or concerns you have.      Donna Graff), PharmD  Endocrine & Diabetes Medication Therapy Management Pharmacist   [address] [place] [id number]    "

## 2024-12-29 ENCOUNTER — HEALTH MAINTENANCE LETTER (OUTPATIENT)
Age: 49
End: 2024-12-29

## 2025-01-13 NOTE — PROGRESS NOTES
Facial Plastic and Reconstructive Surgery Follow up       HPI:   I had the pleasure of seeing Brigitte Robles today in clinic for follow up. We went to the OR on 10/7/24 for a functional septorhinoplasty and this was aborted due to a mass that was present on the septum.      She reports that she is using saline spray and bactroban. She has not gotten any big chunks out. Breathing is a bit improved.         Final Diagnosis   Nasal cavity, right nasal septum, biopsy:  --Small polypoid fragments of mucosa and submucosa with acute and chronic inflammation.  No evidence of malignancy.         1+ Cutibacterium (Propionibacterium) acnes Abnormal          Culture 2+ Staphylococcus aureus Abnormal          Resulting Agency: [facility]      Susceptibility              Staphylococcus aureus       JEYSON       Clindamycin 0.25 ug/mL Susceptible       Daptomycin 0.25 ug/mL Susceptible       Doxycycline <=0.5 ug/mL Susceptible       Erythromycin <=0.25 ug/mL Susceptible       Gentamicin <=0.5 ug/mL Susceptible       Oxacillin 0.5 ug/mL Susceptible 1       Tetracycline <=1 ug/mL Susceptible       Trimethoprim/Sulfamethoxazole <=0.5/9.5 u... Susceptible       Vancomycin <=0.5 ug/mL Susceptible                                    Review Of Systems  ROS: 10 point ROS neg other than the symptoms noted above in the HPI.         Patient Active Problem List   Diagnosis    Depressive disorder, not elsewhere classified    Hypothyroidism    GLUCOCORTICOID DEFICIENT    Anxiety    CARDIOVASCULAR SCREENING; LDL GOAL LESS THAN 160    Seasonal allergic rhinitis    Type 1 diabetes mellitus without complication (H)    Morbid obesity (H)    MONTSERRAT (generalized anxiety disorder)    Mild major depression (H)    Metrorrhagia      Past Surgical History         Past Surgical History:   Procedure Laterality Date    ABDOMEN SURGERY   11.12.2021     Hysterectomy    COLONOSCOPY WITH CO2 INSUFFLATION N/A 7/18/2023     Procedure: Colonoscopy with CO2 insufflation;   "Surgeon: Flavio Small, DO;  Location: [facility] OR    HC REMOVAL OF TONSILS,13+ Y/O             Active Medications          Current Outpatient Medications   Medication Sig Dispense Refill    atorvastatin (LIPITOR) 10 MG tablet Take 1 tablet (10 mg) by mouth daily 90 tablet 3    blood glucose monitoring (ONETOUCH VERIO IQ) test strip Use to test blood sugar 10 times daily or as directed. 900 strip 3    citalopram (CELEXA) 20 MG tablet Take 1 tablet (20 mg) by mouth daily 90 tablet 3    Continuous Glucose Sensor (DEXCOM G7 SENSOR) MISC Change every 10 days. Please dispense G7 sensor with underline below LBL code on the side of box. This is needed for compatibility with insulin pump. 9 each 3    cyclobenzaprine (FLEXERIL) 10 MG tablet Take 1 tablet (10 mg) by mouth 3 times daily as needed for muscle spasms (Patient not taking: Reported on 11/25/2024) 30 tablet 0    Glucagon (BAQSIMI) 3 MG/DOSE nasal powder Spray 1 spray (3 mg) in nostril as needed (for hypoglycemia episode) in the event of unconscious hypoglycemia or hypoglycemic seizure. May repeat dose if no response after 15 minutes. 1 each 1    insulin aspart (NOVOLOG VIAL) 100 UNITS/ML vial Using up to 80 units in insulin pump. 80 mL 2    insulin degludec (TRESIBA FLEXTOUCH) 200 UNIT/ML pen INJECT 30 UNITS SUBCUTANEOUS DAILY (Patient not taking: Reported on 11/25/2024) 15 mL 3    Insulin Infusion Pump (T:SLIM X2 CONTROL-IQ 7.8 PUMP) MAGGY 1 each by Other route See Admin Instructions. Change every 2-3 days 1 each 0    Insulin Infusion Pump Supplies (AUTOSOFT 90 INFUSION SET) MISC 1 each every 3 days. Please dispense 9mm, 23\" 30 each 3    Insulin Infusion Pump Supplies (T:SLIM X2 3ML CARTRIDGE) MISC 1 each every 3 days. 30 each 3    insulin pen needle (B-D U/F) 31G X 5 MM miscellaneous USE 4 DAILY OR AS DIRECTED. 400 each 3    INSULIN PUMP - OUTPATIENT Date Last Updated: 1/25/2021  Tandem  Pump Website Username: [email or web address]  Pump Website " Password: [email or web address]        lisinopril (ZESTRIL) 5 MG tablet Take 1 tablet (5 mg) by mouth daily 90 tablet 3    ondansetron (ZOFRAN ODT) 4 MG ODT tab Take 1 tablet (4 mg) by mouth every 6 hours as needed for nausea. 12 tablet 0    ONETOUCH DELICA LANCETS 33G MISC 1 Box 6 times daily 100 each 12    Semaglutide, 2 MG/DOSE, (OZEMPIC) 8 MG/3ML pen Inject 2 mg subcutaneously every 7 days. 9 mL 3    sodium chloride (OCEAN) 0.65 % nasal spray Spray 2 sprays into both nostrils every 2 hours (while awake). 50 mL 11    SYNTHROID 125 MCG tablet TAKE 1 TABLET DAILY FOR 6 DAYS 84 tablet 4         Levothyroxine  Social History   Social History            Socioeconomic History    Marital status:        Spouse name: Dougie    Number of children: 1    Years of education: 18    Highest education level: Not on file   Occupational History    Occupation: MLT       Comment: [employer]   Tobacco Use    Smoking status: Never       Passive exposure: Past    Smokeless tobacco: Never   Vaping Use    Vaping status: Never Used   Substance and Sexual Activity    Alcohol use: Yes       Comment: rare    Drug use: No    Sexual activity: Yes       Partners: Male       Birth control/protection: Male Surgical, Female Surgical       Comment:  had vascetomy 9 yrs ago   Other Topics Concern    Parent/sibling w/ CABG, MI or angioplasty before 65F 55M? No     Service No    Blood Transfusions No    Caffeine Concern No    Occupational Exposure Yes    Hobby Hazards No    Sleep Concern No    Stress Concern No    Weight Concern No    Special Diet No    Back Care No    Exercise Yes       Comment: Cardio, walking    Bike Helmet No    Seat Belt Yes    Self-Exams No       Comment: Every three months   Social History Narrative    Not on file      Social Drivers of Health           Financial Resource Strain: Not on file   Food Insecurity: Not on file   Transportation Needs: Not on file   Physical Activity: Not on file   Stress: Not on  file   Social Connections: Not on file   Interpersonal Safety: Low Risk  (8/5/2024)     Interpersonal Safety      Do you feel physically and emotionally safe where you currently live?: Yes      Within the past 12 months, have you been hit, slapped, kicked or otherwise physically hurt by someone?: No      Within the past 12 months, have you been humiliated or emotionally abused in other ways by your partner or ex-partner?: No   Housing Stability: Not on file         Family History         Family History   Problem Relation Age of Onset    Diabetes Mother      Hypertension Mother      Thyroid Disease Mother      Diabetes Father           1    Thyroid Disease Father           BORIS'S    Diabetes Sister      Thyroid Disease Sister      Heart Disease Maternal Grandmother           TRIPLE BYPASS    Thyroid Disease Maternal Grandmother      Diabetes Maternal Grandmother           2    Heart Disease Maternal Grandfather           TRIPLE BYPASS    Coronary Artery Disease Maternal Grandfather      Thyroid Disease Maternal Grandfather      Thyroid Disease Brother      Diabetes Paternal Aunt      Diabetes Paternal Uncle      Diabetes Brother           1    Thyroid Disease Brother      Diabetes Nephew           1    Depression Sister      Thyroid Disease Sister      Diabetes Sister           1    Breast Cancer No family hx of      Cancer - colorectal No family hx of              PE:  Alert and Oriented, Answering Questions Appropriately  Atraumatic, Normocephalic, Face Symmetric  Skin: [place] 2  Facial Nerve Intact and facial movement symmetric  EOMI  Nasal Exam: No external Deformity, see procedure note below  Chin: Normal   Lips/Teeth/Toungue/Gums: Lips intact  Neck: Trachea midline  Chest: No wheezing, cyanosis, or stridor  Card: not diaphoretic  Neuro/Psych: CN's 2-12 intact, Moves all extremities, ambulation in intact, positive affect, no notable muscle weakness                                            PROCEDURE:Nasal endoscopy          Indication: Nasal Endoscopy is performed to provide a more thorough evaluation of the nasal airway than seen on standard nasal speculum exam.    Performed by: Dr. Zara Lacy MD  Written consent was obtained prior to procedure.      Findings are as follows:              The septum and turbinate mucosa is healthy. A small amount of atrophic scarring along the septum from biopsy, otherwise appears healthy.            IMPRESSION/PLAN: Brigitte Robles is a 49 year old female. Her septum looks great today. I would like her to continue to bactroban and saline. I will see her back in 3 months, sooner if there are any issues. She is going to [place] in a couple weeks.     Zara Lacy MD         Photodocumentation was obtained.

## 2025-01-14 ENCOUNTER — OFFICE VISIT (OUTPATIENT)
Dept: PLASTIC SURGERY | Facility: CLINIC | Age: 50
End: 2025-01-14
Payer: COMMERCIAL

## 2025-01-14 DIAGNOSIS — J34.89 NASAL OBSTRUCTION: Primary | ICD-10-CM

## 2025-02-19 ENCOUNTER — VIRTUAL VISIT (OUTPATIENT)
Dept: PHARMACY | Facility: CLINIC | Age: 50
End: 2025-02-19
Attending: INTERNAL MEDICINE
Payer: COMMERCIAL

## 2025-02-19 DIAGNOSIS — E10.9 TYPE 1 DIABETES MELLITUS WITHOUT COMPLICATION (H): Primary | ICD-10-CM

## 2025-02-19 DIAGNOSIS — E66.01 MORBID OBESITY (H): ICD-10-CM

## 2025-02-19 RX ORDER — TIRZEPATIDE 5 MG/.5ML
5 INJECTION, SOLUTION SUBCUTANEOUS
Qty: 2 ML | Refills: 1 | Status: SHIPPED | OUTPATIENT
Start: 2025-02-19

## 2025-02-19 NOTE — PATIENT INSTRUCTIONS
"Recommendations from today's MTM visit:                                                      You will start Mounjaro by using one pen once weekly at dose of 5mg weekly starting 2/25    You will get a box of 4 pens from the pharmacy and the 4 pens will last you 4 weeks, make sure you store all pens in the fridge until you use one pen once a week for your dose.    Link for video on how to do the injection: https://mounjaro.Medigus/how-to-use-mounjaro#how-to-use    Use  coupon card to help with cost of Mounjaro, bring the information to the pharmacy and they will re-process the order through your insurance first then the coupon card second, give the pharmacy staff the information before you pay for the medication.   Link: https://mounjaro.Medigus/savings-resources    Call Dexcom Clarity and Tandem to troubleshoot why the data isn't updating. Please reach out via My Chart if having issues with low blood sugars with changing to Mounjaro.    Follow-up: 3/14/25 at 11AM    Endocrine Team & Next Follow-Up:  3/14/25 with Donna  6/2/25 with Dr. Ledesma    It was great speaking with you today.  I value your experience and would be very thankful for your time in providing feedback in our clinic survey. In the next few days, you may receive an email or text message from [employer] with a link to a survey related to your  clinical pharmacist.\"     To schedule another MTM appointment, please call the clinic directly or you may call the MTM scheduling line at [phone].    My Clinical Pharmacist's contact information:                                                      Please feel free to contact me with any questions or concerns you have.      Donna Graff), PharmD  Endocrine & Diabetes Medication Therapy Management Pharmacist   [address] [place] [address]     Contact information:   To schedule a MTM appointment: [phone]  For questions or concerns, please send a MyChart " message or call the clinic at [phone].    For more urgent concerns that do not require 056, please call [phone] after hours/weekends and ask to speak with the Endocrinologist on call.

## 2025-02-19 NOTE — PROGRESS NOTES
Medication Therapy Management (MTM) Encounter    ASSESSMENT:                            Medication Adherence/Access: No issues identified.    Diabetes /Weight Management: Unable to assess patient's recent blood sugar data due to connection issues with Tandem source blake and Dexcom clarity blake thus recommended patient call [employer] and [employer] to help with troubleshooting and recommended patient continue to keep Tandem blake open at all times to see if this can help with connectivity to clinic portal.  Discussed that if calling Tandem and Dexcom is not able to resolve the issue, will have patient work with diabetes education to help with troubleshooting.  Given constipation side effects occurring with Ozempic use, plan in place for patient to transition to Mounjaro therapy now as Mounjaro therapy is usually more effective and better tolerated. Reviewed administration, dosing, storage, and side effects of Mounjaro therapy today.  Recommended patient continue on current pump settings since they declined any recent events of hypoglycemia but reminded patient to monitor for hypoglycemia closely with Mounjaro start and reach out if hypoglycemia occurs.     Due to time constraints, I was only able to assess the above with the patient today. Will follow-up on other disease states in future encounters     PLAN:                            You will start Mounjaro by using one pen once weekly at dose of 5mg weekly starting 2/25    You will get a box of 4 pens from the pharmacy and the 4 pens will last you 4 weeks, make sure you store all pens in the fridge until you use one pen once a week for your dose.    Link for video on how to do the injection: https://mounjaro.irma.com/how-to-use-mounjaro#how-to-use    Use  coupon card to help with cost of Mounjaro, bring the information to the pharmacy and they will re-process the order through your insurance first then the coupon card second, give the pharmacy staff the  information before you pay for the medication.   Link: https://[email or web address]/savings-resources    Call Dexcom Clarity and Tandem to troubleshoot why the data isn't updating. Please reach out via My Chart if having issues with low blood sugars with changing to Mounjaro.    Follow-up: 3/14/25 at 11AM    Endocrine Team & Next Follow-Up:  3/14/25 with Donna  6/2/25 with Dr. Ledesma    SUBJECTIVE/OBJECTIVE:                          Brigitte Robles is a 49 year old female seen for a follow-up visit.       Reason for visit: Diabetes follow up.    Allergies/ADRs: Reviewed in chart  Past Medical History: Reviewed in chart  Tobacco: She reports that she has never smoked. She has been exposed to tobacco smoke. She has never used smokeless tobacco.  Alcohol: Reviewed in chart     Medication Adherence/Access: no issues reported.    Diabetes /Weight Management:   Patient diagnosed with type 1 diabetes, previously treated for type 2 after diagnostic workup was complete.   Current Medications:  Ozempic 2mg weekly on Tuesdays - patient notes that with continued use they have been experiencing troubles with constipation symptoms. Patient notes that a week or 2 ago they were not able to have a bowel movement for 4-5 days. During this time they tried laxatives such as Miralax and Doculax which allowed them to finally have a bowel movement. Continues to struggle with constipation at this time, has started taking taking fiber supplement as of yesterday to help with symptoms.   Patient notes that they are using Tslim X2 insulin pump currently but warranty has ended at the end of October. She notes that she received a new pump but has not started using new system yet. Patient confirms the following pump settings below on current pump.     Keeps Tresiba on hand as needed for vacation or pump failure   Medication history per chart review:  - metformin: ineffective   Blood sugar monitoring: Continuous Glucose Monitor:    Continuing to have connection issues with patient's Tandem Source blake and Dexcom clarity blake and clinic portals thus recent blood sugar data is provided below. Patient is not able to see any information on their Dexcom clarity blake past Jan 20th either. Patient declines any recent events of hypoglycemia or low blood sugars less than 70.          Today's Vitals: LMP 09/22/2021 (Approximate)   ----------------    I spent 30 minutes with this patient today. All changes were made via collaborative practice agreement with Lloyd Ledesma.     A summary of these recommendations was sent via [employer].    Donna Graff), PharmD  Endocrine & Diabetes Medication Therapy Management Pharmacist   [address] [place] [id number]     Contact information:   To schedule a MTM appointment: [phone]  For questions or concerns, please send a [employer] message or call the clinic at [phone].    For more urgent concerns that do not require 655, please call [phone] after hours/weekends and ask to speak with the Endocrinologist on call.      Telemedicine Visit Details  The patient's medications can be safely assessed via a telemedicine encounter.  Type of service:  Telephone visit  Originating Location (pt. Location): Home    Distant Location (provider location):  Off-site  Start Time:  9:02 AM  End Time: 9:32 AM     Medication Therapy Recommendations  Morbid obesity (H)   1 Current Medication: Semaglutide, 2 MG/DOSE, (OZEMPIC) 8 MG/3ML pen (Discontinued)   Current Medication Sig: Inject 2 mg subcutaneously every 7 days.   Rationale: Undesirable effect - Adverse medication event - Safety   Recommendation: Change Medication - Mounjaro 5 MG/0.5ML Soaj   Status: Accepted per CPA   Identified Date: 2/19/2025 Completed Date: 2/19/2025

## 2025-02-20 ENCOUNTER — ANCILLARY PROCEDURE (OUTPATIENT)
Dept: MAMMOGRAPHY | Facility: CLINIC | Age: 50
End: 2025-02-20
Attending: PHYSICIAN ASSISTANT
Payer: COMMERCIAL

## 2025-02-20 DIAGNOSIS — Z12.31 VISIT FOR SCREENING MAMMOGRAM: ICD-10-CM

## 2025-02-20 PROCEDURE — 77063 BREAST TOMOSYNTHESIS BI: CPT | Mod: GC

## 2025-02-20 PROCEDURE — 77067 SCR MAMMO BI INCL CAD: CPT | Mod: GC

## 2025-03-19 ENCOUNTER — OFFICE VISIT (OUTPATIENT)
Dept: URGENT CARE | Facility: URGENT CARE | Age: 50
End: 2025-03-19
Payer: COMMERCIAL

## 2025-03-19 ENCOUNTER — ANCILLARY PROCEDURE (OUTPATIENT)
Dept: GENERAL RADIOLOGY | Facility: CLINIC | Age: 50
End: 2025-03-19
Attending: STUDENT IN AN ORGANIZED HEALTH CARE EDUCATION/TRAINING PROGRAM
Payer: COMMERCIAL

## 2025-03-19 VITALS
OXYGEN SATURATION: 97 % | BODY MASS INDEX: 35.33 KG/M2 | DIASTOLIC BLOOD PRESSURE: 80 MMHG | RESPIRATION RATE: 16 BRPM | WEIGHT: 222.2 LBS | SYSTOLIC BLOOD PRESSURE: 134 MMHG | TEMPERATURE: 99.9 F | HEART RATE: 92 BPM

## 2025-03-19 DIAGNOSIS — R05.1 ACUTE COUGH: ICD-10-CM

## 2025-03-19 DIAGNOSIS — R05.1 ACUTE COUGH: Primary | ICD-10-CM

## 2025-03-19 DIAGNOSIS — E10.9 TYPE 1 DIABETES MELLITUS WITHOUT COMPLICATION (H): ICD-10-CM

## 2025-03-19 PROCEDURE — 99214 OFFICE O/P EST MOD 30 MIN: CPT | Performed by: STUDENT IN AN ORGANIZED HEALTH CARE EDUCATION/TRAINING PROGRAM

## 2025-03-19 PROCEDURE — 3075F SYST BP GE 130 - 139MM HG: CPT | Performed by: STUDENT IN AN ORGANIZED HEALTH CARE EDUCATION/TRAINING PROGRAM

## 2025-03-19 PROCEDURE — 3079F DIAST BP 80-89 MM HG: CPT | Performed by: STUDENT IN AN ORGANIZED HEALTH CARE EDUCATION/TRAINING PROGRAM

## 2025-03-19 PROCEDURE — 71046 X-RAY EXAM CHEST 2 VIEWS: CPT | Mod: TC | Performed by: RADIOLOGY

## 2025-03-19 RX ORDER — AZITHROMYCIN 250 MG/1
TABLET, FILM COATED ORAL
Qty: 6 TABLET | Refills: 0 | Status: SHIPPED | OUTPATIENT
Start: 2025-03-19 | End: 2025-03-24

## 2025-03-19 NOTE — PROGRESS NOTES
Assessment & Plan     Acute cough (Primary)  - XR Chest 2 Views; Future  - azithromycin (ZITHROMAX) 250 MG tablet; Take 2 tablets (500 mg) by mouth daily for 1 day, THEN 1 tablet (250 mg) daily for 4 days.  Dispense: 6 tablet; Refill: 0    Type 1 diabetes mellitus without complication (H)  Negative chest xray per radiology read. Negative home COVID and Influenza tests. Low threshold for treatment for infectious process considering high risk type 1 diabetic and persistent fevers since Saturday with antipyretics. Will treat with a course of Azithromycin.  Should continue home support of fluids, rest, tylenol or ibuprofen for discomfort.  Continue to monitor blood glucose closely. Return to clinic or the emergency room if fever persists, shortness of breath.    Zenaida Corrales, MSN, FNP Student on 3/19/2025 at 1:29 PM     Patient was seen under my supervision with Zenaida Corrales, NP student, I repeated physical examination of patient and plan for treatment and documentation reviewed with Zenaida and with patient/family.    [facility]     Brigitte is a 49 year old female who presents to clinic today for the following health issues:  Chief Complaint   Patient presents with    Cough     Cough, thick phlegm/mucus, chest congestion, lung pain, fever. Sx started Saturday. Tylenol at 10am. Covid and flu negative at home.        CARISSA Briggs is in urgent care today with fevers, productive cough that is producing thick greenish brown mucus. Her symptoms started on Saturday with a sore throat which has resolved.   Denies nausea vomiting chest pain.   Has been monitoring blood glucose levels closely and are in normal range for her.     Has been taking Tylenol, Advil cold and sinus      Review of Systems  Constitutional, HEENT, cardiovascular, pulmonary, gi and gu systems are negative, except as otherwise noted.      Objective    /80 (BP Location: Left arm, Cuff Size:  Adult Large)   Pulse 92   Temp 99.9  F (37.7  C) (Tympanic)   Resp 16   Wt 100.8 kg (222 lb 3.2 oz)   LMP 09/22/2021 (Approximate)   SpO2 97%   BMI 35.33 kg/m    Physical Exam   GENERAL: alert and no distress  HENT: ear canals and TM's normal, nose and mouth without ulcers or lesions  NECK: bilateral anterior cervical adenopathy, no asymmetry, masses, or scars, and thyroid normal to palpation  RESP: lungs clear to auscultation - no rales, rhonchi or wheezes  CV: regular rate and rhythm, normal S1 S2, no S3 or S4, no murmur, click or rub, no peripheral edema  ABDOMEN: soft, nontender, no hepatosplenomegaly, no masses and bowel sounds normal  MS: no gross musculoskeletal defects noted, no edema  PSYCH: mentation appears normal, affect normal/bright    Results for orders placed or performed in visit on 03/19/25   XR Chest 2 Views     Status: None    Narrative    EXAM: XR CHEST 2 VIEWS  LOCATION: [facility]  DATE: 3/19/2025    INDICATION: Cough and fever x5 days.  COMPARISON: None.      Impression    IMPRESSION: Negative chest.

## 2025-03-29 ENCOUNTER — LAB (OUTPATIENT)
Dept: LAB | Facility: CLINIC | Age: 50
End: 2025-03-29
Payer: COMMERCIAL

## 2025-03-29 ENCOUNTER — HEALTH MAINTENANCE LETTER (OUTPATIENT)
Age: 50
End: 2025-03-29

## 2025-03-29 DIAGNOSIS — E66.812 CLASS 2 SEVERE OBESITY WITH SERIOUS COMORBIDITY AND BODY MASS INDEX (BMI) OF 36.0 TO 36.9 IN ADULT, UNSPECIFIED OBESITY TYPE (H): ICD-10-CM

## 2025-03-29 DIAGNOSIS — E06.3 HYPOTHYROIDISM DUE TO HASHIMOTO'S THYROIDITIS: ICD-10-CM

## 2025-03-29 DIAGNOSIS — E66.01 CLASS 2 SEVERE OBESITY WITH SERIOUS COMORBIDITY AND BODY MASS INDEX (BMI) OF 36.0 TO 36.9 IN ADULT, UNSPECIFIED OBESITY TYPE (H): ICD-10-CM

## 2025-03-29 LAB
CHOLEST SERPL-MCNC: 169 MG/DL
CREAT SERPL-MCNC: 0.88 MG/DL (ref 0.51–0.95)
CREAT UR-MCNC: 118 MG/DL
EGFRCR SERPLBLD CKD-EPI 2021: 81 ML/MIN/1.73M2
EST. AVERAGE GLUCOSE BLD GHB EST-MCNC: 157 MG/DL
FASTING STATUS PATIENT QL REPORTED: NO
HBA1C MFR BLD: 7.1 % (ref 0–5.6)
HDLC SERPL-MCNC: 58 MG/DL
LDLC SERPL CALC-MCNC: 94 MG/DL
MICROALBUMIN UR-MCNC: <12 MG/L
MICROALBUMIN/CREAT UR: NORMAL MG/G{CREAT}
NONHDLC SERPL-MCNC: 111 MG/DL
T4 FREE SERPL-MCNC: 1.47 NG/DL (ref 0.9–1.7)
TRIGL SERPL-MCNC: 87 MG/DL
TSH SERPL DL<=0.005 MIU/L-ACNC: 2.01 UIU/ML (ref 0.3–4.2)

## 2025-03-29 PROCEDURE — 80061 LIPID PANEL: CPT

## 2025-03-29 PROCEDURE — 84439 ASSAY OF FREE THYROXINE: CPT

## 2025-03-29 PROCEDURE — 82565 ASSAY OF CREATININE: CPT

## 2025-03-29 PROCEDURE — 83036 HEMOGLOBIN GLYCOSYLATED A1C: CPT

## 2025-03-29 PROCEDURE — 82570 ASSAY OF URINE CREATININE: CPT

## 2025-03-29 PROCEDURE — 84443 ASSAY THYROID STIM HORMONE: CPT

## 2025-03-29 PROCEDURE — 82043 UR ALBUMIN QUANTITATIVE: CPT

## 2025-03-29 PROCEDURE — 36415 COLL VENOUS BLD VENIPUNCTURE: CPT

## 2025-04-03 ENCOUNTER — VIRTUAL VISIT (OUTPATIENT)
Dept: PHARMACY | Facility: CLINIC | Age: 50
End: 2025-04-03
Attending: INTERNAL MEDICINE
Payer: COMMERCIAL

## 2025-04-03 DIAGNOSIS — E10.9 TYPE 1 DIABETES MELLITUS WITHOUT COMPLICATION (H): Primary | ICD-10-CM

## 2025-04-03 DIAGNOSIS — E66.01 MORBID OBESITY (H): ICD-10-CM

## 2025-04-03 NOTE — PATIENT INSTRUCTIONS
"Recommendations from today's MTM visit:                                                      Increase Mounjaro to 7.5 mg weekly starting 4/22    Continue on current pump settings, however if you start having low blood sugars less than 70 then change the following:   If having low blood sugars after bolus then change carb and correction:   Carb ratio:   12 AM: 1:13 to 1:15  10 PM: 1:12 to 1:14    Correction:   1:50 --> 1:55    If having troubles with low blood sugars overnight/in morning then change basal:  12 AM: 1.8 to 1.6    Follow-up: 5/7/25 at [facility]    Endocrine Team & Next Follow-Up:  5/7/25 with Donna  6/2/25 with Dr. Ledesma    It was great speaking with you today.  I value your experience and would be very thankful for your time in providing feedback in our clinic survey. In the next few days, you may receive an email or text message from [employer] with a link to a survey related to your  clinical pharmacist.\"     To schedule another MTM appointment, please call the clinic directly or you may call the MTM scheduling line at [phone].    My Clinical Pharmacist's contact information:                                                      Please feel free to contact me with any questions or concerns you have.      Donna Graff), PharmD  Endocrine & Diabetes Medication Therapy Management Pharmacist   [address] [place] [id number]     Contact information:   To schedule a MTM appointment: [phone]  For questions or concerns, please send a [employer] message or call the clinic at [phone].    For more urgent concerns that do not require 341, please call [phone] after hours/weekends and ask to speak with the Endocrinologist on call.     "

## 2025-04-03 NOTE — PROGRESS NOTES
Medication Therapy Management (MTM) Encounter    ASSESSMENT:                            Medication Adherence/Access: No issues identified.    Diabetes /Weight Management: Patient has better tolerated Mounjaro therapy compared to Ozempic and time in target range has improved and is at goal of >70% thus recommended Mounjaro dose increase to help with weight loss, insulin resistance, reducing insulin requirements, and overall blood sugar control. Since patient has not had any recent events of hypoglycemia, recommend patient continue on current pump settings but educated how to adjust settings if hypoglycemia occurs. Recommend patient follow up with [employer] to troubleshoot blake connection issues.     PLAN:                            Increase Mounjaro to 7.5 mg weekly starting 4/22    Continue on current pump settings, however if you start having low blood sugars less than 70 then change the following:   If having low blood sugars after bolus then change carb and correction:   Carb ratio:   12 AM: 1:13 to 1:15  10 PM: 1:12 to 1:14    Correction:   1:50 --> 1:55    If having troubles with low blood sugars overnight/in morning then change basal:  12 AM: 1.8 to 1.6    Follow-up: 5/7/25 at 1PM    Endocrine Team & Next Follow-Up:  5/7/25 with Donna  6/2/25 with Dr. Ledesma    SUBJECTIVE/OBJECTIVE:                          Brigitte Robles is a 49 year old female called for a follow-up visit.       Reason for visit: Mounjaro follow up.    Allergies/ADRs: Reviewed in chart  Past Medical History: Reviewed in chart  Tobacco: She reports that she has never smoked. She has been exposed to tobacco smoke. She has never used smokeless tobacco.  Alcohol: Reviewed in chart     Medication Adherence/Access: no issues reported.    Diabetes /Weight Management:   Patient diagnosed with type 1 diabetes, previously treated for type 2 after diagnostic workup was complete.   Current Medications:  Mounjaro 5mg weekly on Tuesdays (changed  from Ozempic from last MTM visit) - patient notes that their nausea and constipation symptoms have resolved since changing from Ozempic to Mounjaro. Patient has been on current dose for about 2 months.   Patient notes that they are using Tslim X2 insulin pump currently but warranty has ended at the end of October. She notes that she received a new pump but has not started using new system yet.   Unable to see any recent pump data in clinic portal since November 2024. Patient confirms that they have blake running in background and last log in was today.   Patient reported pump settings:   12AM: 1.8 basal, 1:50 correction, and carb is 1:13 (carb ratio increase since last MTM visit)   10 PM: 1.6 basal, 1:50 correction, and carb is 1:12 (carbo ratio increase since last MTM visit)   Keeps Tresiba on hand as needed for vacation or pump failure   Medication history per chart review:  - metformin: ineffective   - Ozempic: nausea and constipation symptoms, replaced by Mounjaro  Weight: Patient notes that their most recent weight was 218 lbs, notes that they have lost about 10 lbs since transitioning to Mounjaro, was around 230 lbs while on Ozmepic.   Blood sugar monitoring: Continuous Glucose Monitor:   Continuing to have connection issues with patient's [employer] Source blake thus unable to see pump data.          Today's Vitals: LMP 09/22/2021 (Approximate)   ----------------    I spent 19 minutes with this patient today. All changes were made via collaborative practice agreement with Lloyd Ledesma.     A summary of these recommendations was sent via [employer].    Donna Graff), PharmD  Endocrine & Diabetes Medication Therapy Management Pharmacist   [address] [place] [address]     Contact information:   To schedule a MTM appointment: [phone]  For questions or concerns, please send a [employer] message or call the clinic at [phone].    For more urgent concerns that do not require 403, please call  [phone] after hours/weekends and ask to speak with the Endocrinologist on call.      Telemedicine Visit Details  The patient's medications can be safely assessed via a telemedicine encounter.  Type of service:  Telephone visit  Originating Location (pt. Location): Home    Distant Location (provider location):  Off-site  Start Time:  10:40 AM  End Time: 10:59 AM     Medication Therapy Recommendations  Morbid obesity (H)   1 Current Medication: MOUNJARO 5 MG/0.5ML SOAJ (Discontinued)   Current Medication Sig: Inject 0.5 mLs (5 mg) subcutaneously every 7 days.   Rationale: Dose too low - Dosage too low - Effectiveness   Recommendation: Increase Dose   Status: Accepted per CPA   Identified Date: 4/3/2025 Completed Date: 4/3/2025

## 2025-04-19 ENCOUNTER — HEALTH MAINTENANCE LETTER (OUTPATIENT)
Age: 50
End: 2025-04-19

## 2025-04-23 ENCOUNTER — PREP FOR PROCEDURE (OUTPATIENT)
Dept: OTOLARYNGOLOGY | Facility: CLINIC | Age: 50
End: 2025-04-23
Payer: COMMERCIAL

## 2025-04-23 DIAGNOSIS — Z87.81 HISTORY OF FRACTURE OF NASAL BONE: ICD-10-CM

## 2025-04-23 DIAGNOSIS — J34.3 HYPERTROPHY OF BOTH INFERIOR NASAL TURBINATES: ICD-10-CM

## 2025-04-23 DIAGNOSIS — J34.8200 COLLAPSE OF INTERNAL NASAL VALVE: ICD-10-CM

## 2025-04-23 DIAGNOSIS — J34.89 NASAL OBSTRUCTION: Primary | ICD-10-CM

## 2025-04-23 DIAGNOSIS — J34.2 NASAL SEPTAL DEVIATION: ICD-10-CM

## 2025-04-23 RX ORDER — CEFAZOLIN SODIUM 2 G/50ML
2 SOLUTION INTRAVENOUS
OUTPATIENT
Start: 2025-04-23

## 2025-04-23 RX ORDER — CEFAZOLIN SODIUM 2 G/50ML
2 SOLUTION INTRAVENOUS SEE ADMIN INSTRUCTIONS
OUTPATIENT
Start: 2025-04-23

## 2025-05-05 ENCOUNTER — TELEPHONE (OUTPATIENT)
Dept: PLASTIC SURGERY | Facility: CLINIC | Age: 50
End: 2025-05-05

## 2025-05-05 NOTE — TELEPHONE ENCOUNTER
Called patient to schedule her surgery with Dr. Lacy.     Left voicemail.    Zena Junior, Surgical Coordinator   5/5/2025

## 2025-05-07 ENCOUNTER — VIRTUAL VISIT (OUTPATIENT)
Dept: PHARMACY | Facility: CLINIC | Age: 50
End: 2025-05-07
Attending: INTERNAL MEDICINE
Payer: COMMERCIAL

## 2025-05-07 DIAGNOSIS — E66.01 MORBID OBESITY (H): ICD-10-CM

## 2025-05-07 DIAGNOSIS — E10.9 TYPE 1 DIABETES MELLITUS WITHOUT COMPLICATION (H): Primary | ICD-10-CM

## 2025-05-07 NOTE — PROGRESS NOTES
Medication Therapy Management (MTM) Encounter    ASSESSMENT:                            Medication Adherence/Access:     Diabetes /Weight Management: Patient has tolerated Mounjaro dose increase and blood sugars have been controlled and meeting time in target range goal of >70% for past couple days according to pump data and past 14 days occurring to CGM data. Patient to look into why pump data is no longer uploading to clinic portal. Since patient has not had any recent events of hypoglycemia, recommend patient continue on current pump settings. Per patient preference, will continue on current Mounjaro dose since it has been effective for weight loss, insulin resistance, reducing insulin requirements, and overall blood sugar control. May consider dose increase at follow up.     PLAN:                             Continue on Mounjaro 7.5mg weekly and current pump settings     Follow-up: 7/2/25 at 9AM    Endocrine Team & Next Follow-Up:  6/2/25 with Dr. Ledesma  7/2/25 with Donna    SUBJECTIVE/OBJECTIVE:                          Brigitte Robles is a 49 year old female called for a follow-up visit.       Reason for visit: Mounjaro follow up.    Allergies/ADRs: Reviewed in chart  Past Medical History: Reviewed in chart  Tobacco: She reports that she has never smoked. She has been exposed to tobacco smoke. She has never used smokeless tobacco.  Alcohol: Reviewed in chart     Medication Adherence/Access: no issues reported.    Diabetes /Weight Management:   Patient diagnosed with type 1 diabetes, previously treated for type 2 after diagnostic workup was complete.   Current Medications:  Mounjaro 7.5mg weekly on Thursdays (dose increase since last MTM visit) - patient has completed 2 doses thus far, patient declines any side effects with recent dose increase.   Patient notes that they have started with their new Tslim X2 pump on 5/2 with assistance from [employer].   Recent pump settings below:     Keeps Tresiba on  hand as needed for vacation or pump failure   Medication history per chart review:  - metformin: ineffective   - Ozempic: nausea and constipation symptoms, replaced by Mounjaro  Weight: Patient notes that their most recent weight is 214 lbs, weight was around 230 lbs while on Ozmepic.   Blood sugar monitoring: Continuous Glucose Monitor:                 Not able to see pump data from 5/6 or 5/7 for unknown reason           Today's Vitals: LMP 09/22/2021 (Approximate)   ----------------    I spent 19 minutes with this patient today. All changes were made via collaborative practice agreement with Lloyd Ledesma.     A summary of these recommendations was sent via [employer].    Donna WallaceUniversity [facility] Center[facility]), PharmD  Endocrine & Diabetes Medication Therapy Management Pharmacist   [address] [place] [id number]     Contact information:   To schedule a MTM appointment: [phone]  For questions or concerns, please send a [employer] message or call the clinic at [phone].    For more urgent concerns that do not require [phone], please call [phone] after hours/weekends and ask to speak with the Endocrinologist on call.      Telemedicine Visit Details  The patient's medications can be safely assessed via a telemedicine encounter.  Type of service:  Telephone visit  Originating Location (pt. Location): Home    Distant Location (provider location):  Off-site  Start Time: 1:03 PM  End Time: 1:22 PM     Medication Therapy Recommendations  No medication therapy recommendations to display

## 2025-05-08 NOTE — PATIENT INSTRUCTIONS
"Recommendations from today's MTM visit:                                                       Continue on Mounjaro 7.5mg weekly and current pump settings     Follow-up: 7/2/25 at 9AM    Endocrine Team & Next Follow-Up:  6/2/25 with Dr. Ledesma  7/2/25 with Donna    It was great speaking with you today.  I value your experience and would be very thankful for your time in providing feedback in our clinic survey. In the next few days, you may receive an email or text message from [employer] with a link to a survey related to your  clinical pharmacist.\"     To schedule another MTM appointment, please call the clinic directly or you may call the MTM scheduling line at [phone].    My Clinical Pharmacist's contact information:                                                      Please feel free to contact me with any questions or concerns you have.      Donna Graff), PharmD  Endocrine & Diabetes Medication Therapy Management Pharmacist   [address] [place] [address]     Contact information:   To schedule a MTM appointment: [phone]  For questions or concerns, please send a [employer] message or call the clinic at [phone].    For more urgent concerns that do not require 224, please call [phone] after hours/weekends and ask to speak with the Endocrinologist on call.     "

## 2025-05-28 ENCOUNTER — MYC MEDICAL ADVICE (OUTPATIENT)
Dept: ENDOCRINOLOGY | Facility: CLINIC | Age: 50
End: 2025-05-28
Payer: COMMERCIAL

## 2025-06-02 NOTE — TELEPHONE ENCOUNTER
6/2 Called and spoke to patient, appointment has been rescheduled.     [facility]   Orthopedics, Podiatry, Sports Medicine, Ent ,Eye , Audiology, Adult Endocrine & Diabetes, Nutrition & Medication Therapy Management Specialties   [facility]

## 2025-07-02 ENCOUNTER — VIRTUAL VISIT (OUTPATIENT)
Dept: PHARMACY | Facility: CLINIC | Age: 50
End: 2025-07-02
Attending: INTERNAL MEDICINE
Payer: COMMERCIAL

## 2025-07-02 DIAGNOSIS — E66.01 MORBID OBESITY (H): ICD-10-CM

## 2025-07-02 DIAGNOSIS — E10.9 TYPE 1 DIABETES MELLITUS WITHOUT COMPLICATION (H): Primary | ICD-10-CM

## 2025-07-02 NOTE — PROGRESS NOTES
Medication Therapy Management (MTM) Encounter    ASSESSMENT:                            Medication Adherence/Access: No issues identified.    Diabetes /Weight Management: Patient's time in target range continues to be at goal of >70% based on patient's current sensor data and patient notes that current dose of Mounjaro continues to be effective for weight loss, insulin resistance, reducing insulin requirements, and overall blood sugar control thus recommend patient continue on current therapy. Since patient declines any troubles with hypoglycemia, recommend patient continue on current pump settings. Recommend patient reach out to [employer] to troubleshoot why pump data is no longer uploading to clinic portal and reach out to MTM after doing so.      PLAN:                             Continue on Mounjaro 7.5mg weekly and current pump settings     Reach out to [employer] to help troubleshoot [employer] Source blake and issue connecting to clinic portal, send me a my chart message after you are able to do this so I can check to see if the issue resolved     Follow-up: 8/28/25 at 9AM     SUBJECTIVE/OBJECTIVE:                          Brigitte Robles is a 49 year old female called for a follow-up visit.       Reason for visit: Diabetes follow up.    Allergies/ADRs: Reviewed in chart  Past Medical History: Reviewed in chart  Tobacco: She reports that she has never smoked. She has been exposed to tobacco smoke. She has never used smokeless tobacco.  Alcohol: Reviewed in chart     Medication Adherence/Access: no issues reported.    Diabetes /Weight Management:   Patient diagnosed with type 1 diabetes, previously treated for type 2 after diagnostic workup was complete.   Current Medications:  Mounjaro 7.5mg weekly on Thursdays - patient declines any GI related side effect with continued use, notes that current dose continues to be effective for weight management and blood sugar control.   Patient uses [employer] Tslim X2 pump, recent pump  settings below:   Patient declines any recent pump setting changes and confirms current pump settings below.     Keeps Tresiba on hand as needed for vacation or pump failure   Medication history per chart review:  - metformin: ineffective   - Ozempic: nausea and constipation symptoms, replaced by Mounjaro  Weight: Patient notes that their most recent weight is 204 lbs, weight was around 230 lbs while on Ozmepic prior to Mounjaro therapy.   Blood sugar monitoring: Continuous Glucose Monitor:   For unknown reason, patient's current pump continues to not connect to clinic portal, most recent pump data is from 5/8/2025, patient notes that they have followed recommendations from [employer] but still not connecting. Patient declines any issues with low blood sugars less than 70.         Today's Vitals: LMP 09/22/2021 (Approximate)   ----------------    I spent 23 minutes with this patient today. All changes were made via collaborative practice agreement with Lloyd Ledesma.     A summary of these recommendations was sent via [employer].    Donna Graff), PharmD  Endocrine & Diabetes Medication Therapy Management Pharmacist   [address] [place] [address]     Contact information:   To schedule a MTM appointment: [phone]  For questions or concerns, please send a [employer] message or call the clinic at [phone].    For more urgent concerns that do not require 448, please call [phone] after hours/weekends and ask to speak with the Endocrinologist on call.      Telemedicine Visit Details  The patient's medications can be safely assessed via a telemedicine encounter.  Type of service:  Telephone visit  Originating Location (pt. Location): Home    Distant Location (provider location):  Off-site  Start Time: 9:03 AM  End Time: 9:26 AM     Medication Therapy Recommendations  No medication therapy recommendations to display

## 2025-07-02 NOTE — PATIENT INSTRUCTIONS
"Recommendations from today's MTM visit:                                                       Continue on Mounjaro 7.5mg weekly and current pump settings     Reach out to [employer] to help troubleshoot [employer] Source blake and issue connecting to clinic portal, send me a my chart message after you are able to do this so I can check to see if the issue resolved     Follow-up: 8/28/25 at 9AM     It was great speaking with you today.  I value your experience and would be very thankful for your time in providing feedback in our clinic survey. In the next few days, you may receive an email or text message from [employer] with a link to a survey related to your  clinical pharmacist.\"     To schedule another MTM appointment, please call the clinic directly or you may call the MTM scheduling line at [phone].    My Clinical Pharmacist's contact information:                                                      Please feel free to contact me with any questions or concerns you have.      Donna Graff), PharmD  Endocrine & Diabetes Medication Therapy Management Pharmacist   [address] [place] [address]     Contact information:   To schedule a MTM appointment: [phone]  For questions or concerns, please send a [employer] message or call the clinic at [phone].    For more urgent concerns that do not require 532, please call [phone] after hours/weekends and ask to speak with the Endocrinologist on call.     "

## 2025-07-09 ENCOUNTER — TELEPHONE (OUTPATIENT)
Dept: ENDOCRINOLOGY | Facility: CLINIC | Age: 50
End: 2025-07-09
Payer: COMMERCIAL

## 2025-08-22 DIAGNOSIS — E10.9 TYPE 1 DIABETES MELLITUS WITHOUT COMPLICATION (H): ICD-10-CM

## 2025-08-25 RX ORDER — INSULIN ASPART 100 [IU]/ML
INJECTION, SOLUTION INTRAVENOUS; SUBCUTANEOUS
Qty: 80 ML | Refills: 3 | Status: SHIPPED | OUTPATIENT
Start: 2025-08-25

## 2025-08-28 ENCOUNTER — VIRTUAL VISIT (OUTPATIENT)
Dept: PHARMACY | Facility: CLINIC | Age: 50
End: 2025-08-28
Attending: INTERNAL MEDICINE
Payer: COMMERCIAL

## 2025-08-28 DIAGNOSIS — E66.01 MORBID OBESITY (H): ICD-10-CM

## 2025-08-28 DIAGNOSIS — E10.9 TYPE 1 DIABETES MELLITUS WITHOUT COMPLICATION (H): Primary | ICD-10-CM

## 2025-09-01 ENCOUNTER — PATIENT OUTREACH (OUTPATIENT)
Dept: CARE COORDINATION | Facility: CLINIC | Age: 50
End: 2025-09-01
Payer: COMMERCIAL

## (undated) DEVICE — PAD CHUX UNDERPAD 23X24" 7136

## (undated) DEVICE — KIT ENDO FIRST STEP DISINFECTANT 200ML W/POUCH EP-4